# Patient Record
Sex: FEMALE | Race: WHITE | NOT HISPANIC OR LATINO | Employment: UNEMPLOYED | URBAN - NONMETROPOLITAN AREA
[De-identification: names, ages, dates, MRNs, and addresses within clinical notes are randomized per-mention and may not be internally consistent; named-entity substitution may affect disease eponyms.]

---

## 2017-05-04 ENCOUNTER — HOSPITAL ENCOUNTER (OUTPATIENT)
Dept: GENERAL RADIOLOGY | Facility: HOSPITAL | Age: 52
Discharge: HOME | End: 2017-05-04
Attending: INTERNAL MEDICINE | Admitting: INTERNAL MEDICINE

## 2017-05-04 ENCOUNTER — TRANSCRIBE ORDERS (OUTPATIENT)
Dept: ADMINISTRATIVE | Facility: HOSPITAL | Age: 52
End: 2017-05-04

## 2017-05-04 DIAGNOSIS — R05.9 COUGH: Primary | ICD-10-CM

## 2017-05-04 DIAGNOSIS — R05.9 COUGH: ICD-10-CM

## 2017-05-04 PROCEDURE — 71020 HC CHEST PA AND LATERAL: CPT

## 2017-05-04 PROCEDURE — 71020 XR CHEST PA AND LATERAL: CPT | Performed by: RADIOLOGY

## 2017-05-16 ENCOUNTER — TRANSCRIBE ORDERS (OUTPATIENT)
Dept: ADMINISTRATIVE | Facility: HOSPITAL | Age: 52
End: 2017-05-16

## 2017-05-16 DIAGNOSIS — J44.9 OBSTRUCTIVE CHRONIC BRONCHITIS WITHOUT EXACERBATION (HCC): Primary | ICD-10-CM

## 2017-05-23 ENCOUNTER — HOSPITAL ENCOUNTER (OUTPATIENT)
Dept: CT IMAGING | Facility: HOSPITAL | Age: 52
Discharge: HOME | End: 2017-05-23
Attending: INTERNAL MEDICINE | Admitting: INTERNAL MEDICINE

## 2017-05-23 DIAGNOSIS — J44.9 OBSTRUCTIVE CHRONIC BRONCHITIS WITHOUT EXACERBATION (HCC): ICD-10-CM

## 2017-05-23 PROCEDURE — 71260 CT THORAX DX C+: CPT | Performed by: RADIOLOGY

## 2017-05-23 PROCEDURE — 0 IOPAMIDOL 61 % SOLUTION: Performed by: INTERNAL MEDICINE

## 2017-05-23 PROCEDURE — 71260 CT THORAX DX C+: CPT

## 2017-05-23 RX ADMIN — IOPAMIDOL 80 ML: 612 INJECTION, SOLUTION INTRAVENOUS at 10:00

## 2017-05-25 ENCOUNTER — TRANSCRIBE ORDERS (OUTPATIENT)
Dept: ADMINISTRATIVE | Facility: HOSPITAL | Age: 52
End: 2017-05-25

## 2017-05-25 ENCOUNTER — APPOINTMENT (OUTPATIENT)
Dept: CT IMAGING | Facility: HOSPITAL | Age: 52
End: 2017-05-25
Attending: INTERNAL MEDICINE

## 2017-05-25 DIAGNOSIS — N63.0 BREAST NODULE: Primary | ICD-10-CM

## 2017-06-06 ENCOUNTER — TRANSCRIBE ORDERS (OUTPATIENT)
Dept: ADMINISTRATIVE | Facility: HOSPITAL | Age: 52
End: 2017-06-06

## 2017-06-06 ENCOUNTER — HOSPITAL ENCOUNTER (OUTPATIENT)
Dept: MAMMOGRAPHY | Facility: HOSPITAL | Age: 52
Discharge: HOME | End: 2017-06-06
Attending: INTERNAL MEDICINE | Admitting: INTERNAL MEDICINE

## 2017-06-06 ENCOUNTER — HOSPITAL ENCOUNTER (OUTPATIENT)
Dept: GENERAL RADIOLOGY | Facility: HOSPITAL | Age: 52
Discharge: HOME | End: 2017-06-06
Attending: INTERNAL MEDICINE

## 2017-06-06 ENCOUNTER — HOSPITAL ENCOUNTER (OUTPATIENT)
Dept: ULTRASOUND IMAGING | Facility: HOSPITAL | Age: 52
Discharge: HOME | End: 2017-06-06
Attending: INTERNAL MEDICINE

## 2017-06-06 DIAGNOSIS — N63.0 BREAST NODULE: ICD-10-CM

## 2017-06-06 DIAGNOSIS — M54.2 CERVICALGIA: Primary | ICD-10-CM

## 2017-06-06 DIAGNOSIS — M54.2 CERVICALGIA: ICD-10-CM

## 2017-06-06 PROCEDURE — 72050 X-RAY EXAM NECK SPINE 4/5VWS: CPT

## 2017-06-06 PROCEDURE — 76641 ULTRASOUND BREAST COMPLETE: CPT | Performed by: RADIOLOGY

## 2017-06-06 PROCEDURE — G0204 DX MAMMO INCL CAD BI: HCPCS

## 2017-06-06 PROCEDURE — 72050 X-RAY EXAM NECK SPINE 4/5VWS: CPT | Performed by: RADIOLOGY

## 2017-06-06 PROCEDURE — G0279 TOMOSYNTHESIS, MAMMO: HCPCS

## 2017-06-06 PROCEDURE — 77066 DX MAMMO INCL CAD BI: CPT | Performed by: RADIOLOGY

## 2017-06-06 PROCEDURE — 76641 ULTRASOUND BREAST COMPLETE: CPT

## 2017-06-06 PROCEDURE — 77062 BREAST TOMOSYNTHESIS BI: CPT | Performed by: RADIOLOGY

## 2017-09-06 ENCOUNTER — HOSPITAL ENCOUNTER (OUTPATIENT)
Dept: GENERAL RADIOLOGY | Facility: HOSPITAL | Age: 52
Discharge: HOME | End: 2017-09-06
Attending: INTERNAL MEDICINE | Admitting: INTERNAL MEDICINE

## 2017-09-06 ENCOUNTER — TRANSCRIBE ORDERS (OUTPATIENT)
Dept: ADMINISTRATIVE | Facility: HOSPITAL | Age: 52
End: 2017-09-06

## 2017-09-06 DIAGNOSIS — Z13.828 SCREENING FOR RHEUMATOID ARTHRITIS: Primary | ICD-10-CM

## 2017-09-06 DIAGNOSIS — Z13.828 SCREENING FOR RHEUMATOID ARTHRITIS: ICD-10-CM

## 2017-09-06 PROCEDURE — 72082 X-RAY EXAM ENTIRE SPI 2/3 VW: CPT

## 2017-09-06 PROCEDURE — 72082 X-RAY EXAM ENTIRE SPI 2/3 VW: CPT | Performed by: RADIOLOGY

## 2018-03-09 ENCOUNTER — TRANSCRIBE ORDERS (OUTPATIENT)
Dept: ADMINISTRATIVE | Facility: HOSPITAL | Age: 53
End: 2018-03-09

## 2018-03-09 DIAGNOSIS — R10.2 PELVIC PAIN IN FEMALE: Primary | ICD-10-CM

## 2018-03-12 ENCOUNTER — HOSPITAL ENCOUNTER (OUTPATIENT)
Dept: ULTRASOUND IMAGING | Facility: HOSPITAL | Age: 53
Discharge: HOME | End: 2018-03-12
Attending: INTERNAL MEDICINE | Admitting: INTERNAL MEDICINE

## 2018-03-12 DIAGNOSIS — R10.2 PELVIC PAIN IN FEMALE: ICD-10-CM

## 2018-03-12 PROCEDURE — 76830 TRANSVAGINAL US NON-OB: CPT | Performed by: RADIOLOGY

## 2018-03-12 PROCEDURE — 76830 TRANSVAGINAL US NON-OB: CPT

## 2018-05-09 ENCOUNTER — APPOINTMENT (OUTPATIENT)
Dept: GENERAL RADIOLOGY | Facility: HOSPITAL | Age: 53
End: 2018-05-09

## 2018-05-09 ENCOUNTER — HOSPITAL ENCOUNTER (EMERGENCY)
Facility: HOSPITAL | Age: 53
Discharge: HOME | End: 2018-05-09
Admitting: INTERNAL MEDICINE

## 2018-05-09 VITALS
HEIGHT: 65 IN | SYSTOLIC BLOOD PRESSURE: 138 MMHG | OXYGEN SATURATION: 97 % | DIASTOLIC BLOOD PRESSURE: 76 MMHG | WEIGHT: 131 LBS | RESPIRATION RATE: 17 BRPM | HEART RATE: 84 BPM | BODY MASS INDEX: 21.83 KG/M2 | TEMPERATURE: 98 F

## 2018-05-09 DIAGNOSIS — S86.912A MUSCLE STRAIN OF LEFT LOWER LEG, INITIAL ENCOUNTER: ICD-10-CM

## 2018-05-09 DIAGNOSIS — S76.012A HIP STRAIN, LEFT, INITIAL ENCOUNTER: Primary | ICD-10-CM

## 2018-05-09 PROCEDURE — 73502 X-RAY EXAM HIP UNI 2-3 VIEWS: CPT

## 2018-05-09 PROCEDURE — 73564 X-RAY EXAM KNEE 4 OR MORE: CPT

## 2018-05-09 PROCEDURE — 73590 X-RAY EXAM OF LOWER LEG: CPT | Performed by: RADIOLOGY

## 2018-05-09 PROCEDURE — 73590 X-RAY EXAM OF LOWER LEG: CPT

## 2018-05-09 PROCEDURE — 73564 X-RAY EXAM KNEE 4 OR MORE: CPT | Performed by: RADIOLOGY

## 2018-05-09 PROCEDURE — 73503 X-RAY EXAM HIP UNI 4/> VIEWS: CPT | Performed by: RADIOLOGY

## 2018-05-09 PROCEDURE — 99283 EMERGENCY DEPT VISIT LOW MDM: CPT

## 2018-05-09 RX ORDER — ERGOCALCIFEROL (VITAMIN D2) 10 MCG
400 TABLET ORAL DAILY
COMMUNITY
End: 2022-08-02 | Stop reason: ALTCHOICE

## 2018-05-09 RX ORDER — ALBUTEROL SULFATE 90 UG/1
2 AEROSOL, METERED RESPIRATORY (INHALATION) EVERY 4 HOURS PRN
COMMUNITY
End: 2021-03-10 | Stop reason: SDUPTHER

## 2018-05-09 RX ORDER — AMITRIPTYLINE HYDROCHLORIDE 10 MG/1
10 TABLET, FILM COATED ORAL NIGHTLY
COMMUNITY
End: 2019-10-29

## 2018-05-09 RX ORDER — METHOCARBAMOL 750 MG/1
750 TABLET, FILM COATED ORAL 2 TIMES DAILY
COMMUNITY
End: 2018-06-25

## 2018-05-09 NOTE — ED PROVIDER NOTES
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  Hip location:  L hip  Leg location:  L leg and L lower leg  Pain details:     Quality:  Shooting    Radiates to:  L leg    Severity:  Moderate    Onset quality:  Gradual    Duration:  5 days    Timing:  Constant    Progression:  Waxing and waning  Chronicity:  New  Dislocation: no    Foreign body present:  No foreign bodies  Tetanus status:  Up to date  Prior injury to area:  No  Relieved by:  Nothing  Worsened by:  Rotation  Ineffective treatments:  None tried  Associated symptoms: no back pain, no decreased ROM, no fever, no itching, no muscle weakness, no neck pain, no numbness, no swelling and no tingling    Risk factors: no concern for non-accidental trauma and no frequent fractures        Review of Systems   Constitutional: Negative.  Negative for fever.   HENT: Negative.    Eyes: Negative.    Respiratory: Negative.    Cardiovascular: Negative.    Gastrointestinal: Negative.    Endocrine: Negative.    Genitourinary: Negative.    Musculoskeletal: Negative.  Negative for back pain and neck pain.   Skin: Negative.  Negative for itching.   Allergic/Immunologic: Negative.    Neurological: Negative.    Hematological: Negative.    Psychiatric/Behavioral: Negative.        Past Medical History:   Diagnosis Date   • Arthritis    • COPD (chronic obstructive pulmonary disease)    • Migraines        Allergies   Allergen Reactions   • Floxin [Ofloxacin]        History reviewed. No pertinent surgical history.    Family History   Problem Relation Age of Onset   • Hypertension Mother    • Hypertension Father    • Heart disease Sister    • Diabetes Sister    • Breast cancer Neg Hx        Social History     Social  History   • Marital status:      Social History Main Topics   • Smoking status: Current Every Day Smoker     Packs/day: 1.00   • Smokeless tobacco: Never Used   • Alcohol use No   • Drug use: No     Other Topics Concern   • Not on file           Objective   Physical Exam   Constitutional: She appears well-developed and well-nourished.   HENT:   Head: Normocephalic.   Right Ear: External ear normal.   Left Ear: External ear normal.   Mouth/Throat: Oropharynx is clear and moist.   Eyes: EOM are normal. Pupils are equal, round, and reactive to light.   Neck: Normal range of motion. Neck supple.   Cardiovascular: Normal rate and regular rhythm.    Pulmonary/Chest: Effort normal and breath sounds normal.   Abdominal: Soft. Bowel sounds are normal.   Musculoskeletal:   Pain in left hip and left lower leg with external rotation; non-tender, no swelling or erythema   Skin: Skin is warm and dry. Capillary refill takes less than 2 seconds.   Psychiatric: She has a normal mood and affect. Her behavior is normal.   Nursing note and vitals reviewed.      Procedures           ED Course  ED Course                  MDM      Final diagnoses:   Hip strain, left, initial encounter   Muscle strain of left lower leg, initial encounter            Wayne Partida, APRN  05/09/18 1212

## 2018-06-25 ENCOUNTER — HOSPITAL ENCOUNTER (EMERGENCY)
Facility: HOSPITAL | Age: 53
Discharge: HOME | End: 2018-06-25
Attending: INTERNAL MEDICINE | Admitting: INTERNAL MEDICINE

## 2018-06-25 ENCOUNTER — APPOINTMENT (OUTPATIENT)
Dept: ULTRASOUND IMAGING | Facility: HOSPITAL | Age: 53
End: 2018-06-25

## 2018-06-25 ENCOUNTER — APPOINTMENT (OUTPATIENT)
Dept: CT IMAGING | Facility: HOSPITAL | Age: 53
End: 2018-06-25

## 2018-06-25 VITALS
HEART RATE: 85 BPM | DIASTOLIC BLOOD PRESSURE: 77 MMHG | WEIGHT: 132 LBS | OXYGEN SATURATION: 100 % | BODY MASS INDEX: 21.99 KG/M2 | SYSTOLIC BLOOD PRESSURE: 156 MMHG | TEMPERATURE: 98.5 F | RESPIRATION RATE: 18 BRPM | HEIGHT: 65 IN

## 2018-06-25 DIAGNOSIS — M54.16 LUMBAR RADICULOPATHY: Primary | ICD-10-CM

## 2018-06-25 LAB
ALBUMIN SERPL-MCNC: 4.6 G/DL (ref 3.5–5)
ALBUMIN/GLOB SERPL: 1.6 G/DL (ref 1.5–2.5)
ALP SERPL-CCNC: 116 U/L (ref 35–104)
ALT SERPL W P-5'-P-CCNC: 26 U/L (ref 10–36)
ANION GAP SERPL CALCULATED.3IONS-SCNC: 2.3 MMOL/L (ref 3.6–11.2)
AST SERPL-CCNC: 23 U/L (ref 10–30)
BASOPHILS # BLD AUTO: 0.09 10*3/MM3 (ref 0–0.3)
BASOPHILS NFR BLD AUTO: 1.1 % (ref 0–2)
BILIRUB SERPL-MCNC: 0.5 MG/DL (ref 0.2–1.8)
BUN BLD-MCNC: 9 MG/DL (ref 7–21)
BUN/CREAT SERPL: 13.8 (ref 7–25)
CALCIUM SPEC-SCNC: 9.4 MG/DL (ref 7.7–10)
CHLORIDE SERPL-SCNC: 110 MMOL/L (ref 99–112)
CO2 SERPL-SCNC: 27.7 MMOL/L (ref 24.3–31.9)
CREAT BLD-MCNC: 0.65 MG/DL (ref 0.43–1.29)
DEPRECATED RDW RBC AUTO: 45 FL (ref 37–54)
EOSINOPHIL # BLD AUTO: 0.09 10*3/MM3 (ref 0–0.7)
EOSINOPHIL NFR BLD AUTO: 1.1 % (ref 0–5)
ERYTHROCYTE [DISTWIDTH] IN BLOOD BY AUTOMATED COUNT: 12.3 % (ref 11.5–14.5)
ERYTHROCYTE [SEDIMENTATION RATE] IN BLOOD: 8 MM/HR (ref 0–20)
GFR SERPL CREATININE-BSD FRML MDRD: 96 ML/MIN/1.73
GLOBULIN UR ELPH-MCNC: 2.8 GM/DL
GLUCOSE BLD-MCNC: 94 MG/DL (ref 70–110)
HCT VFR BLD AUTO: 42.9 % (ref 37–47)
HGB BLD-MCNC: 15.1 G/DL (ref 12–16)
IMM GRANULOCYTES # BLD: 0.02 10*3/MM3 (ref 0–0.03)
IMM GRANULOCYTES NFR BLD: 0.2 % (ref 0–0.5)
LYMPHOCYTES # BLD AUTO: 3.75 10*3/MM3 (ref 1–3)
LYMPHOCYTES NFR BLD AUTO: 44.4 % (ref 21–51)
MCH RBC QN AUTO: 35.6 PG (ref 27–33)
MCHC RBC AUTO-ENTMCNC: 35.2 G/DL (ref 33–37)
MCV RBC AUTO: 101.2 FL (ref 80–94)
MONOCYTES # BLD AUTO: 0.56 10*3/MM3 (ref 0.1–0.9)
MONOCYTES NFR BLD AUTO: 6.6 % (ref 0–10)
NEUTROPHILS # BLD AUTO: 3.93 10*3/MM3 (ref 1.4–6.5)
NEUTROPHILS NFR BLD AUTO: 46.6 % (ref 30–70)
OSMOLALITY SERPL CALC.SUM OF ELEC: 277.8 MOSM/KG (ref 273–305)
PLATELET # BLD AUTO: 327 10*3/MM3 (ref 130–400)
PMV BLD AUTO: 9.7 FL (ref 6–10)
POTASSIUM BLD-SCNC: 4 MMOL/L (ref 3.5–5.3)
PROT SERPL-MCNC: 7.4 G/DL (ref 6–8)
RBC # BLD AUTO: 4.24 10*6/MM3 (ref 4.2–5.4)
SODIUM BLD-SCNC: 140 MMOL/L (ref 135–153)
WBC NRBC COR # BLD: 8.44 10*3/MM3 (ref 4.5–12.5)

## 2018-06-25 PROCEDURE — 85652 RBC SED RATE AUTOMATED: CPT | Performed by: PHYSICIAN ASSISTANT

## 2018-06-25 PROCEDURE — 93926 LOWER EXTREMITY STUDY: CPT

## 2018-06-25 PROCEDURE — 72192 CT PELVIS W/O DYE: CPT

## 2018-06-25 PROCEDURE — 36415 COLL VENOUS BLD VENIPUNCTURE: CPT

## 2018-06-25 PROCEDURE — 93926 LOWER EXTREMITY STUDY: CPT | Performed by: RADIOLOGY

## 2018-06-25 PROCEDURE — 80053 COMPREHEN METABOLIC PANEL: CPT | Performed by: PHYSICIAN ASSISTANT

## 2018-06-25 PROCEDURE — 72192 CT PELVIS W/O DYE: CPT | Performed by: RADIOLOGY

## 2018-06-25 PROCEDURE — 72131 CT LUMBAR SPINE W/O DYE: CPT | Performed by: RADIOLOGY

## 2018-06-25 PROCEDURE — 72131 CT LUMBAR SPINE W/O DYE: CPT

## 2018-06-25 PROCEDURE — 85025 COMPLETE CBC W/AUTO DIFF WBC: CPT | Performed by: PHYSICIAN ASSISTANT

## 2018-06-25 PROCEDURE — 99284 EMERGENCY DEPT VISIT MOD MDM: CPT

## 2018-06-25 RX ORDER — IBUPROFEN 800 MG/1
800 TABLET ORAL EVERY 6 HOURS PRN
COMMUNITY
End: 2018-06-25

## 2018-06-25 RX ORDER — CYCLOBENZAPRINE HCL 10 MG
10 TABLET ORAL 3 TIMES DAILY PRN
Qty: 15 TABLET | Refills: 0 | OUTPATIENT
Start: 2018-06-25 | End: 2019-10-29

## 2018-06-25 RX ORDER — FENOPROFEN CALCIUM 600 MG
600 TABLET ORAL 3 TIMES DAILY
Qty: 20 TABLET | Refills: 0 | OUTPATIENT
Start: 2018-06-25 | End: 2019-10-29

## 2018-11-20 ENCOUNTER — TRANSCRIBE ORDERS (OUTPATIENT)
Dept: ADMINISTRATIVE | Facility: HOSPITAL | Age: 53
End: 2018-11-20

## 2018-11-20 DIAGNOSIS — R10.9 STOMACH ACHE: Primary | ICD-10-CM

## 2018-12-04 ENCOUNTER — APPOINTMENT (OUTPATIENT)
Dept: ULTRASOUND IMAGING | Facility: HOSPITAL | Age: 53
End: 2018-12-04
Attending: INTERNAL MEDICINE

## 2018-12-18 ENCOUNTER — TRANSCRIBE ORDERS (OUTPATIENT)
Dept: ADMINISTRATIVE | Facility: HOSPITAL | Age: 53
End: 2018-12-18

## 2018-12-18 DIAGNOSIS — H40.1290 LOW TENSION GLAUCOMA, UNSPECIFIED GLAUCOMA STAGE, UNSPECIFIED LATERALITY: Primary | ICD-10-CM

## 2018-12-18 DIAGNOSIS — H53.40 VISUAL FIELD DEFECTS: ICD-10-CM

## 2018-12-31 ENCOUNTER — APPOINTMENT (OUTPATIENT)
Dept: CARDIOLOGY | Facility: HOSPITAL | Age: 53
End: 2018-12-31
Attending: OPHTHALMOLOGY

## 2019-01-08 ENCOUNTER — APPOINTMENT (OUTPATIENT)
Dept: CARDIOLOGY | Facility: HOSPITAL | Age: 54
End: 2019-01-08
Attending: OPHTHALMOLOGY

## 2019-02-12 ENCOUNTER — HOSPITAL ENCOUNTER (OUTPATIENT)
Dept: GENERAL RADIOLOGY | Facility: HOSPITAL | Age: 54
Discharge: HOME | End: 2019-02-12
Admitting: INTERNAL MEDICINE

## 2019-02-12 ENCOUNTER — HOSPITAL ENCOUNTER (OUTPATIENT)
Dept: GENERAL RADIOLOGY | Facility: HOSPITAL | Age: 54
Discharge: HOME | End: 2019-02-12

## 2019-02-12 ENCOUNTER — TRANSCRIBE ORDERS (OUTPATIENT)
Dept: ADMINISTRATIVE | Facility: HOSPITAL | Age: 54
End: 2019-02-12

## 2019-02-12 DIAGNOSIS — M25.552 PAIN OF LEFT HIP JOINT: ICD-10-CM

## 2019-02-12 DIAGNOSIS — M25.561 RIGHT KNEE PAIN, UNSPECIFIED CHRONICITY: ICD-10-CM

## 2019-02-12 DIAGNOSIS — M25.552 PAIN OF LEFT HIP JOINT: Primary | ICD-10-CM

## 2019-02-12 DIAGNOSIS — R07.9 CHEST PAIN, UNSPECIFIED TYPE: ICD-10-CM

## 2019-02-12 PROCEDURE — 73564 X-RAY EXAM KNEE 4 OR MORE: CPT

## 2019-02-12 PROCEDURE — 71046 X-RAY EXAM CHEST 2 VIEWS: CPT | Performed by: RADIOLOGY

## 2019-02-12 PROCEDURE — 73503 X-RAY EXAM HIP UNI 4/> VIEWS: CPT

## 2019-02-12 PROCEDURE — 73564 X-RAY EXAM KNEE 4 OR MORE: CPT | Performed by: RADIOLOGY

## 2019-02-12 PROCEDURE — 73502 X-RAY EXAM HIP UNI 2-3 VIEWS: CPT | Performed by: RADIOLOGY

## 2019-02-12 PROCEDURE — 71046 X-RAY EXAM CHEST 2 VIEWS: CPT

## 2019-03-26 ENCOUNTER — APPOINTMENT (OUTPATIENT)
Dept: CARDIOLOGY | Facility: HOSPITAL | Age: 54
End: 2019-03-26

## 2019-03-26 ENCOUNTER — APPOINTMENT (OUTPATIENT)
Dept: ULTRASOUND IMAGING | Facility: HOSPITAL | Age: 54
End: 2019-03-26

## 2019-03-26 ENCOUNTER — HOSPITAL ENCOUNTER (OUTPATIENT)
Dept: GENERAL RADIOLOGY | Facility: HOSPITAL | Age: 54
Discharge: HOME | End: 2019-03-26
Admitting: INTERNAL MEDICINE

## 2019-03-26 ENCOUNTER — TRANSCRIBE ORDERS (OUTPATIENT)
Dept: ADMINISTRATIVE | Facility: HOSPITAL | Age: 54
End: 2019-03-26

## 2019-03-26 DIAGNOSIS — H53.40 VFD (VISUAL FIELD DEFECT): ICD-10-CM

## 2019-03-26 DIAGNOSIS — M89.8X2 PAIN OF RIGHT HUMERUS: Primary | ICD-10-CM

## 2019-03-26 DIAGNOSIS — M89.8X2 PAIN OF RIGHT HUMERUS: ICD-10-CM

## 2019-03-26 DIAGNOSIS — H40.1290 LOW TENSION GLAUCOMA, UNSPECIFIED GLAUCOMA STAGE, UNSPECIFIED LATERALITY: Primary | ICD-10-CM

## 2019-03-26 PROCEDURE — 73060 X-RAY EXAM OF HUMERUS: CPT

## 2019-03-26 PROCEDURE — 73060 X-RAY EXAM OF HUMERUS: CPT | Performed by: RADIOLOGY

## 2019-10-28 ENCOUNTER — APPOINTMENT (OUTPATIENT)
Dept: GENERAL RADIOLOGY | Facility: HOSPITAL | Age: 54
End: 2019-10-28

## 2019-10-28 ENCOUNTER — HOSPITAL ENCOUNTER (EMERGENCY)
Facility: HOSPITAL | Age: 54
Discharge: LEFT BEFORE DISCHARGE | End: 2019-10-28

## 2019-10-28 VITALS
HEIGHT: 65 IN | RESPIRATION RATE: 18 BRPM | WEIGHT: 126 LBS | SYSTOLIC BLOOD PRESSURE: 169 MMHG | HEART RATE: 101 BPM | OXYGEN SATURATION: 99 % | TEMPERATURE: 98.1 F | DIASTOLIC BLOOD PRESSURE: 85 MMHG | BODY MASS INDEX: 20.99 KG/M2

## 2019-10-28 PROCEDURE — 99211 OFF/OP EST MAY X REQ PHY/QHP: CPT

## 2019-10-28 PROCEDURE — 93005 ELECTROCARDIOGRAM TRACING: CPT | Performed by: EMERGENCY MEDICINE

## 2019-10-28 PROCEDURE — 93010 ELECTROCARDIOGRAM REPORT: CPT | Performed by: INTERNAL MEDICINE

## 2019-10-28 RX ORDER — SODIUM CHLORIDE 0.9 % (FLUSH) 0.9 %
10 SYRINGE (ML) INJECTION AS NEEDED
Status: DISCONTINUED | OUTPATIENT
Start: 2019-10-28 | End: 2019-10-28 | Stop reason: HOSPADM

## 2019-10-29 ENCOUNTER — APPOINTMENT (OUTPATIENT)
Dept: GENERAL RADIOLOGY | Facility: HOSPITAL | Age: 54
End: 2019-10-29

## 2019-10-29 ENCOUNTER — HOSPITAL ENCOUNTER (EMERGENCY)
Facility: HOSPITAL | Age: 54
Discharge: HOME | End: 2019-10-29
Attending: EMERGENCY MEDICINE | Admitting: EMERGENCY MEDICINE

## 2019-10-29 ENCOUNTER — APPOINTMENT (OUTPATIENT)
Dept: ULTRASOUND IMAGING | Facility: HOSPITAL | Age: 54
End: 2019-10-29

## 2019-10-29 ENCOUNTER — APPOINTMENT (OUTPATIENT)
Dept: CT IMAGING | Facility: HOSPITAL | Age: 54
End: 2019-10-29

## 2019-10-29 VITALS
SYSTOLIC BLOOD PRESSURE: 134 MMHG | OXYGEN SATURATION: 98 % | BODY MASS INDEX: 20.99 KG/M2 | RESPIRATION RATE: 18 BRPM | HEIGHT: 65 IN | WEIGHT: 126 LBS | TEMPERATURE: 98.7 F | DIASTOLIC BLOOD PRESSURE: 87 MMHG | HEART RATE: 90 BPM

## 2019-10-29 DIAGNOSIS — R20.2 PARESTHESIA OF LEFT ARM AND LEG: Primary | ICD-10-CM

## 2019-10-29 DIAGNOSIS — R07.9 CHEST PAIN, UNSPECIFIED TYPE: ICD-10-CM

## 2019-10-29 LAB
ALBUMIN SERPL-MCNC: 4.37 G/DL (ref 3.5–5.2)
ALBUMIN/GLOB SERPL: 1.5 G/DL
ALP SERPL-CCNC: 90 U/L (ref 39–117)
ALT SERPL W P-5'-P-CCNC: 16 U/L (ref 1–33)
ANION GAP SERPL CALCULATED.3IONS-SCNC: 11.4 MMOL/L (ref 5–15)
APTT PPP: 28.4 SECONDS (ref 23.8–36.1)
AST SERPL-CCNC: 17 U/L (ref 1–32)
BACTERIA UR QL AUTO: ABNORMAL /HPF
BASOPHILS # BLD AUTO: 0.07 10*3/MM3 (ref 0–0.2)
BASOPHILS NFR BLD AUTO: 0.8 % (ref 0–1.5)
BILIRUB SERPL-MCNC: 0.3 MG/DL (ref 0.2–1.2)
BILIRUB UR QL STRIP: NEGATIVE
BUN BLD-MCNC: 8 MG/DL (ref 6–20)
BUN/CREAT SERPL: 12.5 (ref 7–25)
CALCIUM SPEC-SCNC: 9.3 MG/DL (ref 8.6–10.5)
CHLORIDE SERPL-SCNC: 106 MMOL/L (ref 98–107)
CLARITY UR: CLEAR
CO2 SERPL-SCNC: 24.6 MMOL/L (ref 22–29)
COLOR UR: YELLOW
CREAT BLD-MCNC: 0.64 MG/DL (ref 0.57–1)
DEPRECATED RDW RBC AUTO: 47.5 FL (ref 37–54)
EOSINOPHIL # BLD AUTO: 0.1 10*3/MM3 (ref 0–0.4)
EOSINOPHIL NFR BLD AUTO: 1.1 % (ref 0.3–6.2)
ERYTHROCYTE [DISTWIDTH] IN BLOOD BY AUTOMATED COUNT: 12.5 % (ref 12.3–15.4)
GFR SERPL CREATININE-BSD FRML MDRD: 98 ML/MIN/1.73
GLOBULIN UR ELPH-MCNC: 2.9 GM/DL
GLUCOSE BLD-MCNC: 114 MG/DL (ref 65–99)
GLUCOSE UR STRIP-MCNC: NEGATIVE MG/DL
HCT VFR BLD AUTO: 44.4 % (ref 34–46.6)
HGB BLD-MCNC: 15.2 G/DL (ref 12–15.9)
HGB UR QL STRIP.AUTO: ABNORMAL
HOLD SPECIMEN: NORMAL
HOLD SPECIMEN: NORMAL
HYALINE CASTS UR QL AUTO: ABNORMAL /LPF
IMM GRANULOCYTES # BLD AUTO: 0.03 10*3/MM3 (ref 0–0.05)
IMM GRANULOCYTES NFR BLD AUTO: 0.3 % (ref 0–0.5)
INR PPP: 0.94 (ref 0.9–1.1)
KETONES UR QL STRIP: NEGATIVE
LEUKOCYTE ESTERASE UR QL STRIP.AUTO: NEGATIVE
LIPASE SERPL-CCNC: 27 U/L (ref 13–60)
LYMPHOCYTES # BLD AUTO: 4.06 10*3/MM3 (ref 0.7–3.1)
LYMPHOCYTES NFR BLD AUTO: 44.9 % (ref 19.6–45.3)
MAGNESIUM SERPL-MCNC: 1.8 MG/DL (ref 1.6–2.6)
MCH RBC QN AUTO: 34.7 PG (ref 26.6–33)
MCHC RBC AUTO-ENTMCNC: 34.2 G/DL (ref 31.5–35.7)
MCV RBC AUTO: 101.4 FL (ref 79–97)
MONOCYTES # BLD AUTO: 0.46 10*3/MM3 (ref 0.1–0.9)
MONOCYTES NFR BLD AUTO: 5.1 % (ref 5–12)
NEUTROPHILS # BLD AUTO: 4.33 10*3/MM3 (ref 1.7–7)
NEUTROPHILS NFR BLD AUTO: 47.8 % (ref 42.7–76)
NITRITE UR QL STRIP: NEGATIVE
NRBC BLD AUTO-RTO: 0 /100 WBC (ref 0–0.2)
NT-PROBNP SERPL-MCNC: 27.9 PG/ML (ref 5–900)
PH UR STRIP.AUTO: <=5 [PH] (ref 5–8)
PLATELET # BLD AUTO: 340 10*3/MM3 (ref 140–450)
PMV BLD AUTO: 10.1 FL (ref 6–12)
POTASSIUM BLD-SCNC: 3.6 MMOL/L (ref 3.5–5.2)
PROT SERPL-MCNC: 7.3 G/DL (ref 6–8.5)
PROT UR QL STRIP: NEGATIVE
PROTHROMBIN TIME: 13 SECONDS (ref 11–15.4)
RBC # BLD AUTO: 4.38 10*6/MM3 (ref 3.77–5.28)
RBC # UR: ABNORMAL /HPF
REF LAB TEST METHOD: ABNORMAL
SODIUM BLD-SCNC: 142 MMOL/L (ref 136–145)
SP GR UR STRIP: 1.01 (ref 1–1.03)
SQUAMOUS #/AREA URNS HPF: ABNORMAL /HPF
TROPONIN T SERPL-MCNC: <0.01 NG/ML (ref 0–0.03)
TSH SERPL DL<=0.05 MIU/L-ACNC: 1.14 UIU/ML (ref 0.27–4.2)
UROBILINOGEN UR QL STRIP: ABNORMAL
WBC NRBC COR # BLD: 9.05 10*3/MM3 (ref 3.4–10.8)
WBC UR QL AUTO: ABNORMAL /HPF
WHOLE BLOOD HOLD SPECIMEN: NORMAL
WHOLE BLOOD HOLD SPECIMEN: NORMAL

## 2019-10-29 PROCEDURE — 81001 URINALYSIS AUTO W/SCOPE: CPT | Performed by: PHYSICIAN ASSISTANT

## 2019-10-29 PROCEDURE — 84484 ASSAY OF TROPONIN QUANT: CPT | Performed by: PHYSICIAN ASSISTANT

## 2019-10-29 PROCEDURE — 70450 CT HEAD/BRAIN W/O DYE: CPT

## 2019-10-29 PROCEDURE — 83735 ASSAY OF MAGNESIUM: CPT | Performed by: PHYSICIAN ASSISTANT

## 2019-10-29 PROCEDURE — 71045 X-RAY EXAM CHEST 1 VIEW: CPT | Performed by: RADIOLOGY

## 2019-10-29 PROCEDURE — 83690 ASSAY OF LIPASE: CPT | Performed by: PHYSICIAN ASSISTANT

## 2019-10-29 PROCEDURE — 71045 X-RAY EXAM CHEST 1 VIEW: CPT

## 2019-10-29 PROCEDURE — 93005 ELECTROCARDIOGRAM TRACING: CPT | Performed by: EMERGENCY MEDICINE

## 2019-10-29 PROCEDURE — 85730 THROMBOPLASTIN TIME PARTIAL: CPT | Performed by: PHYSICIAN ASSISTANT

## 2019-10-29 PROCEDURE — 93010 ELECTROCARDIOGRAM REPORT: CPT | Performed by: INTERNAL MEDICINE

## 2019-10-29 PROCEDURE — 93880 EXTRACRANIAL BILAT STUDY: CPT

## 2019-10-29 PROCEDURE — 85610 PROTHROMBIN TIME: CPT | Performed by: PHYSICIAN ASSISTANT

## 2019-10-29 PROCEDURE — 83880 ASSAY OF NATRIURETIC PEPTIDE: CPT | Performed by: PHYSICIAN ASSISTANT

## 2019-10-29 PROCEDURE — 99284 EMERGENCY DEPT VISIT MOD MDM: CPT

## 2019-10-29 PROCEDURE — 96360 HYDRATION IV INFUSION INIT: CPT

## 2019-10-29 PROCEDURE — 70450 CT HEAD/BRAIN W/O DYE: CPT | Performed by: RADIOLOGY

## 2019-10-29 PROCEDURE — 80050 GENERAL HEALTH PANEL: CPT | Performed by: PHYSICIAN ASSISTANT

## 2019-10-29 PROCEDURE — 93880 EXTRACRANIAL BILAT STUDY: CPT | Performed by: RADIOLOGY

## 2019-10-29 PROCEDURE — 96361 HYDRATE IV INFUSION ADD-ON: CPT

## 2019-10-29 RX ORDER — CITALOPRAM 20 MG/1
20 TABLET ORAL DAILY
Qty: 30 TABLET | Refills: 0 | Status: SHIPPED | OUTPATIENT
Start: 2019-10-29 | End: 2020-05-13

## 2019-10-29 RX ORDER — SODIUM CHLORIDE 9 MG/ML
125 INJECTION, SOLUTION INTRAVENOUS CONTINUOUS
Status: DISCONTINUED | OUTPATIENT
Start: 2019-10-29 | End: 2019-10-29 | Stop reason: HOSPADM

## 2019-10-29 RX ORDER — ASPIRIN 81 MG/1
324 TABLET, CHEWABLE ORAL ONCE
Status: COMPLETED | OUTPATIENT
Start: 2019-10-29 | End: 2019-10-29

## 2019-10-29 RX ORDER — SODIUM CHLORIDE 0.9 % (FLUSH) 0.9 %
10 SYRINGE (ML) INJECTION AS NEEDED
Status: DISCONTINUED | OUTPATIENT
Start: 2019-10-29 | End: 2019-10-29 | Stop reason: HOSPADM

## 2019-10-29 RX ADMIN — SODIUM CHLORIDE 125 ML/HR: 9 INJECTION, SOLUTION INTRAVENOUS at 11:33

## 2019-10-29 RX ADMIN — ASPIRIN 324 MG: 81 TABLET, CHEWABLE ORAL at 13:08

## 2019-10-30 ENCOUNTER — TRANSCRIBE ORDERS (OUTPATIENT)
Dept: ADMINISTRATIVE | Facility: HOSPITAL | Age: 54
End: 2019-10-30

## 2019-10-30 DIAGNOSIS — R07.9 CHEST PAIN, UNSPECIFIED TYPE: Primary | ICD-10-CM

## 2020-02-11 ENCOUNTER — OFFICE VISIT (OUTPATIENT)
Dept: PULMONOLOGY | Facility: CLINIC | Age: 55
End: 2020-02-11

## 2020-02-11 VITALS
SYSTOLIC BLOOD PRESSURE: 130 MMHG | TEMPERATURE: 98 F | HEIGHT: 65 IN | OXYGEN SATURATION: 98 % | DIASTOLIC BLOOD PRESSURE: 80 MMHG | WEIGHT: 123 LBS | BODY MASS INDEX: 20.49 KG/M2 | HEART RATE: 81 BPM

## 2020-02-11 DIAGNOSIS — J84.9 ILD (INTERSTITIAL LUNG DISEASE) (HCC): ICD-10-CM

## 2020-02-11 DIAGNOSIS — J43.2 CENTRILOBULAR EMPHYSEMA (HCC): Primary | ICD-10-CM

## 2020-02-11 DIAGNOSIS — F17.210 CIGARETTE NICOTINE DEPENDENCE WITHOUT COMPLICATION: ICD-10-CM

## 2020-02-11 PROCEDURE — 99407 BEHAV CHNG SMOKING > 10 MIN: CPT | Performed by: NURSE PRACTITIONER

## 2020-02-11 PROCEDURE — 94664 DEMO&/EVAL PT USE INHALER: CPT | Performed by: NURSE PRACTITIONER

## 2020-02-11 PROCEDURE — 99203 OFFICE O/P NEW LOW 30 MIN: CPT | Performed by: NURSE PRACTITIONER

## 2020-02-11 PROCEDURE — 94618 PULMONARY STRESS TESTING: CPT | Performed by: NURSE PRACTITIONER

## 2020-02-11 RX ORDER — METHOCARBAMOL 750 MG/1
750 TABLET, FILM COATED ORAL 4 TIMES DAILY PRN
COMMUNITY

## 2020-02-11 RX ORDER — ACETAMINOPHEN 500 MG
500 TABLET ORAL EVERY 6 HOURS PRN
COMMUNITY

## 2020-02-11 RX ORDER — LANOLIN ALCOHOL/MO/W.PET/CERES
1000 CREAM (GRAM) TOPICAL DAILY
COMMUNITY
End: 2022-08-02 | Stop reason: ALTCHOICE

## 2020-02-11 RX ORDER — IPRATROPIUM BROMIDE AND ALBUTEROL SULFATE 2.5; .5 MG/3ML; MG/3ML
3 SOLUTION RESPIRATORY (INHALATION) 4 TIMES DAILY PRN
Qty: 120 ML | Refills: 5 | Status: SHIPPED | OUTPATIENT
Start: 2020-02-11 | End: 2020-05-13

## 2020-02-11 NOTE — PROGRESS NOTES
Were you born premature?  No    Any Childhood infections? No      Breathing problems when you were a child? No     Any childhood allergies?   No            At what age did you begin smoking? 20 years old     Smoking marijuana? No     Any IV drugs? No     How many packs per day? 1/2 a pack     Lung Function Test? yes  Chest X-Ray? yes    CT Chest? yes Allergy Test? no    Family hx of Lung disease or Lung Cancer? Yes     If FHx is posivitive for lung cancer, what is the relationship of the family member? Dad     Any hospitalization in the last year? No     How far can you walk without getting short of breath? Not far     Any coughing? Yes     Any wheezing? Yes     Acid Reflux? No     Do you snore? No     Daytime Fatigue? Yes     Any pets? Yes  Any pet allergies? No     Occupation? Not employed     Have you been exposed to any chemicals at your job? no    What inhalers are you currently using? Albuterol and brovana    Have you had the Influenza Vaccine? no   Would you like to receive this Vaccine today? no    Have you had the Pneumonia Vaccine?  no  Would you like to receive this Vaccine today? no      Subjective    Basilia Coco Chamberlain presents for the following COPD and LUNG DISEASE  .    History of Present Illness     Ms. Chamberlain is a 54 year old female that has a medical history significant for COPD, fibromyalgia, reflex sympathetic dystrophy and migraines.    She presents today for further evaluation of COPD and chronic interstitial lung disease.  She states that she was diagnosed with COPD recently and during an ER visit in October 2019 she was told she had chronic interstitial lung disease.  She states that she experiences shortness of breath and smothering all the time but that it is worse in the morning and with exertion.  She states that she also has a cough that is intermittently productive of a clear sputum that seems to be worse in the morning.  She also voices difficulty when lying flat on her  back but states that it is mostly due to her back problems.  She also complains of pain in her right lung that is worse with inspiration.  She currently takes Brovana nebs twice daily and albuterol  as needed.  She is a current smoker of about half pack per day.  She states that she smoked about 2 packs/day for 15 years and has recently cut down over the last few months to half a pack.        Review of Systems   Respiratory: Positive for cough, shortness of breath and wheezing.        Active Problems:  Problem List Items Addressed This Visit     None      Visit Diagnoses     Centrilobular emphysema (CMS/[facility])    -  Primary    ILD (interstitial lung disease) (CMS/[facility])        Cigarette nicotine dependence without complication              Past Medical History:  Past Medical History:   Diagnosis Date   • Anxiety 06/25/2018   • Arthritis    • COPD (chronic obstructive pulmonary disease) (CMS/[facility])    • Fibromyalgia    • Migraines    • RSD (reflex sympathetic dystrophy)        Family History:  Family History   Problem Relation Age of Onset   • Hypertension Mother    • Hypertension Father    • Heart disease Sister    • Diabetes Sister    • Breast cancer Neg Hx        Social History:  Social History     Tobacco Use   • Smoking status: Current Every Day Smoker     Packs/day: 0.50     Years: 34.00     Pack years: 17.00     Types: Cigarettes   • Smokeless tobacco: Never Used   • Tobacco comment: smoked about 2 ppd for about 15 years. recently cut down to 1/2 ppd.   Substance Use Topics   • Alcohol use: No       Current Medications:  Current Outpatient Medications   Medication Sig Dispense Refill   • acetaminophen (TYLENOL) 500 MG tablet Take 500 mg by mouth Every 6 (Six) Hours As Needed for Mild Pain .     • albuterol (PROVENTIL HFA;VENTOLIN HFA) 108 (90 Base) MCG/ACT inhaler Inhale 2 puffs Every 4 (Four) Hours As Needed for Wheezing.     • ALPRAZolam (XANAX) 0.25 MG tablet Take 0.25 mg by mouth 2 (two) times a day as needed  "for anxiety.     • arformoterol (BROVANA) 15 MCG/2ML nebulizer solution Take  by nebulization 2 (two) times a day.     • methocarbamol (ROBAXIN) 750 MG tablet Take 750 mg by mouth 4 (Four) Times a Day As Needed for Muscle Spasms.     • vitamin B-12 (CYANOCOBALAMIN) 1000 MCG tablet Take 1,000 mcg by mouth Daily.     • Vitamin D, Cholecalciferol, (CHOLECALCIFEROL) 400 units tablet Take 400 Units by mouth Daily.     • citalopram (CELEXA) 20 MG tablet Take 1 tablet by mouth Daily. 30 tablet 0     No current facility-administered medications for this visit.        Allergies:  Allergies   Allergen Reactions   • Floxin [Ofloxacin]        Vitals:  /80   Pulse 81   Temp 98 °F (36.7 °C)   Ht 165.1 cm (65\")   Wt 55.8 kg (123 lb)   LMP  (LMP Unknown)   SpO2 98%   BMI 20.47 kg/m²     Imaging:    Imaging Results (Most Recent)     None          Pulmonary Functions Testing Results:    No results found for: FEV1, FVC, OUV8CKG, TLC, DLCO    Results for orders placed or performed during the hospital encounter of 10/29/19   Comprehensive Metabolic Panel   Result Value Ref Range    Glucose 114 (H) 65 - 99 mg/dL    BUN 8 6 - 20 mg/dL    Creatinine 0.64 0.57 - 1.00 mg/dL    Sodium 142 136 - 145 mmol/L    Potassium 3.6 3.5 - 5.2 mmol/L    Chloride 106 98 - 107 mmol/L    CO2 24.6 22.0 - 29.0 mmol/L    Calcium 9.3 8.6 - 10.5 mg/dL    Total Protein 7.3 6.0 - 8.5 g/dL    Albumin 4.37 3.50 - 5.20 g/dL    ALT (SGPT) 16 1 - 33 U/L    AST (SGOT) 17 1 - 32 U/L    Alkaline Phosphatase 90 39 - 117 U/L    Total Bilirubin 0.3 0.2 - 1.2 mg/dL    eGFR Non African Amer 98 >60 mL/min/1.73    Globulin 2.9 gm/dL    A/G Ratio 1.5 g/dL    BUN/Creatinine Ratio 12.5 7.0 - 25.0    Anion Gap 11.4 5.0 - 15.0 mmol/L   Protime-INR   Result Value Ref Range    Protime 13.0 11.0 - 15.4 Seconds    INR 0.94 0.90 - 1.10   aPTT   Result Value Ref Range    PTT 28.4 23.8 - 36.1 seconds   Lipase   Result Value Ref Range    Lipase 27 13 - 60 U/L   Urinalysis With " Microscopic If Indicated (No Culture) - Urine, Clean Catch   Result Value Ref Range    Color, UA Yellow Yellow, Straw    Appearance, UA Clear Clear    pH, UA <=5.0 5.0 - 8.0    Specific Gravity, UA 1.014 1.005 - 1.030    Glucose, UA Negative Negative    Ketones, UA Negative Negative    Bilirubin, UA Negative Negative    Blood, UA Moderate (2+) (A) Negative    Protein, UA Negative Negative    Leuk Esterase, UA Negative Negative    Nitrite, UA Negative Negative    Urobilinogen, UA 1.0 E.U./dL 0.2 - 1.0 E.U./dL   Troponin   Result Value Ref Range    Troponin T <0.010 0.000 - 0.030 ng/mL   BNP   Result Value Ref Range    proBNP 27.9 5.0 - 900.0 pg/mL   Magnesium   Result Value Ref Range    Magnesium 1.8 1.6 - 2.6 mg/dL   TSH   Result Value Ref Range    TSH 1.140 0.270 - 4.200 uIU/mL   Urinalysis, Microscopic Only - Urine, Clean Catch   Result Value Ref Range    RBC, UA 6-12 (A) None Seen, 0-2 /HPF    WBC, UA 0-2 None Seen, 0-2 /HPF    Bacteria, UA None Seen None Seen /HPF    Squamous Epithelial Cells, UA 7-12 (A) None Seen, 0-2 /HPF    Hyaline Casts, UA None Seen None Seen /LPF    Methodology Automated Microscopy    CBC Auto Differential   Result Value Ref Range    WBC 9.05 3.40 - 10.80 10*3/mm3    RBC 4.38 3.77 - 5.28 10*6/mm3    Hemoglobin 15.2 12.0 - 15.9 g/dL    Hematocrit 44.4 34.0 - 46.6 %    .4 (H) 79.0 - 97.0 fL    MCH 34.7 (H) 26.6 - 33.0 pg    MCHC 34.2 31.5 - 35.7 g/dL    RDW 12.5 12.3 - 15.4 %    RDW-SD 47.5 37.0 - 54.0 fl    MPV 10.1 6.0 - 12.0 fL    Platelets 340 140 - 450 10*3/mm3    Neutrophil % 47.8 42.7 - 76.0 %    Lymphocyte % 44.9 19.6 - 45.3 %    Monocyte % 5.1 5.0 - 12.0 %    Eosinophil % 1.1 0.3 - 6.2 %    Basophil % 0.8 0.0 - 1.5 %    Immature Grans % 0.3 0.0 - 0.5 %    Neutrophils, Absolute 4.33 1.70 - 7.00 10*3/mm3    Lymphocytes, Absolute 4.06 (H) 0.70 - 3.10 10*3/mm3    Monocytes, Absolute 0.46 0.10 - 0.90 10*3/mm3    Eosinophils, Absolute 0.10 0.00 - 0.40 10*3/mm3    Basophils,  Absolute 0.07 0.00 - 0.20 10*3/mm3    Immature Grans, Absolute 0.03 0.00 - 0.05 10*3/mm3    nRBC 0.0 0.0 - 0.2 /100 WBC   Light Blue Top   Result Value Ref Range    Extra Tube hold for add-on    Green Top (Gel)   Result Value Ref Range    Extra Tube Hold for add-ons.    Lavender Top   Result Value Ref Range    Extra Tube hold for add-on    Gold Top - SST   Result Value Ref Range    Extra Tube Hold for add-ons.        Objective   Physical Exam     GENERAL APPEARANCE: Well developed, well nourished, alert and cooperative, and appears to be in no acute distress.    HEAD: normocephalic. Atraumatic.    EYES: PERRL, EOMI. Vision is grossly intact.    THROAT: Oral cavity and pharynx normal. No inflammation, swelling, exudate, or lesions.     NECK: Neck supple.  No thyromegaly.    CARDIAC: Normal S1 and S2. No S3, S4 or murmurs. Rhythm is regular. There is no peripheral edema, cyanosis or pallor. Extremities are warm and well perfused. Capillary refill is less than 2 seconds. No carotid bruits.    RESPIRATORY:Bilateral air entry positive. Bilateral diminished breath sounds. No wheezing, crackles or rhonchi noted.    GI: Positive bowel sounds. Soft, nondistended, nontender.     MUSCULOSKELETAL: No significant deformity or joint abnormality. No edema. Peripheral pulses intact. No varicosities.    NEUROLOGICAL: Strength and sensation symmetric and intact throughout.     PSYCHIATRIC: The mental examination revealed the patient was oriented to person, place, and time.       Assessment/Plan        Centrilobular emphysema:  -Will order a PFT to assess lung function.  -Has an appointment Friday to see Dr. Sanchez, cardiology. She is scheduled for an echo and stress test at this time.  -Ordered an alpha one antitrypsin swab for genotype  -6 MWT completed in office: lowest oxygen saturation was 96%.  Patient stopped walking after 4 minutes due to fatigue.  No indication for supplemental oxygen at this time.  -We will order an  overnight oximetry study to assess oxygen saturation at night.  -Continue albuterol inhaler as needed.  -We will discontinue Wale maher and start her on an Anoro inhaler.  As this is a new inhaler inhalation  education was provided.       - Inhaler technique demonstration/discussion:  I have extensively discussed the steps.  In summary, the steps were discussed in the following order.Patient was advised to rinse the mouth after steroid inhalation to prevent fungal mucositis.  · Remove the cap from the inhaler and shake well.    · Breathe out all the way.    · Place the mouthpiece of the inhaler between your teeth and seal your lips tightly around it.    · As you start to breathe in slowly, press down on the canister one time.   · Keep breathing in as slowly and deeply as you can.    · It should take about 5 seconds for you to completely breathe in.    · Hold your breath for 10 seconds(count to 10 slowly) to allow the medication to reach the airways of the lung.    · Repeat the above steps for each puff.    · Wait about 1 minute between the puffs.    · Replaced the cap on the inhaler when finished.      -Sent prescription for DuoNeb's every 4 hours as needed to her pharmacy.    Chronic interstitial lung disease:  Chest x-ray completed in October 2019 reported chronic interstitial lung disease per radiologist reading.  -She gives no history of auto immune disease.  -Will order a hi resolution CT scan of chest to further evaluate lung parenchyma.    Current Smoker:  Smokes 1/2 pdd    Basilia Chamberlain  reports that she has been smoking cigarettes. She has a 17.00 pack-year smoking history. She has never used smokeless tobacco.. I have educated her on the risk of diseases from using tobacco products such as cancer, COPD and heart diease.     I advised her to quit and she is willing to quit. We have discussed the following method/s for tobacco cessation:  Counseling.  Together we have set a quit date for 1 month  from today.  She will follow up with me in a few months or sooner to check on her progress.    I spent >10 minutes counseling the patient.    She was given Chantix by her PCP but has not started it yet.        Patient's Body mass index is 20.47 kg/m². BMI is within normal parameters. No follow-up required.     Patient has not had her influenza vaccines this year.  She declines vaccines at today's visit.      ICD-10-CM ICD-9-CM   1. Centrilobular emphysema (CMS/HCC) J43.2 492.8   2. ILD (interstitial lung disease) (CMS/HCC) J84.9 515   3. Cigarette nicotine dependence without complication F17.210 305.1       Return in about 3 months (around 5/11/2020).

## 2020-02-18 DIAGNOSIS — G47.34 NOCTURNAL HYPOXIA: Primary | ICD-10-CM

## 2020-02-20 ENCOUNTER — TELEPHONE (OUTPATIENT)
Dept: PULMONOLOGY | Facility: CLINIC | Age: 55
End: 2020-02-20

## 2020-02-20 NOTE — TELEPHONE ENCOUNTER
----- Message from ALANA Nicholson sent at 2/18/2020  4:16 PM EST -----  Do you care to let her know that she will need oxygen at night.  I have out the order in.   Also, I think some of these are not getting linked to my basket because her result wasn't in there.      ----- Message -----  From: Alanis Dutta  Sent: 2/17/2020   4:43 PM EST  To: ALANA Nicholson    Patient would like results of here Overnight pulse OX    2/20/2020  Spoke with patient and made sure she received her O2. She stated that it was burning her nostrils when using. Provider recommended that she use saline nasal spray and we are checking to make sure her oxygen has a humidifier attached.     Patient understood.

## 2020-02-26 ENCOUNTER — TELEPHONE (OUTPATIENT)
Dept: PULMONOLOGY | Facility: CLINIC | Age: 55
End: 2020-02-26

## 2020-02-26 ENCOUNTER — HOSPITAL ENCOUNTER (OUTPATIENT)
Dept: CT IMAGING | Facility: HOSPITAL | Age: 55
Discharge: HOME | End: 2020-02-26

## 2020-02-26 ENCOUNTER — HOSPITAL ENCOUNTER (OUTPATIENT)
Dept: RESPIRATORY THERAPY | Facility: HOSPITAL | Age: 55
Discharge: HOME | End: 2020-02-26
Admitting: NURSE PRACTITIONER

## 2020-02-26 DIAGNOSIS — J43.2 CENTRILOBULAR EMPHYSEMA (HCC): ICD-10-CM

## 2020-02-26 DIAGNOSIS — J84.9 ILD (INTERSTITIAL LUNG DISEASE) (HCC): ICD-10-CM

## 2020-02-26 PROCEDURE — 94729 DIFFUSING CAPACITY: CPT | Performed by: INTERNAL MEDICINE

## 2020-02-26 PROCEDURE — 71250 CT THORAX DX C-: CPT | Performed by: RADIOLOGY

## 2020-02-26 PROCEDURE — 94727 GAS DIL/WSHOT DETER LNG VOL: CPT

## 2020-02-26 PROCEDURE — 71250 CT THORAX DX C-: CPT

## 2020-02-26 PROCEDURE — 94727 GAS DIL/WSHOT DETER LNG VOL: CPT | Performed by: INTERNAL MEDICINE

## 2020-02-26 PROCEDURE — 94060 EVALUATION OF WHEEZING: CPT | Performed by: INTERNAL MEDICINE

## 2020-02-26 PROCEDURE — 94729 DIFFUSING CAPACITY: CPT

## 2020-02-26 PROCEDURE — 94640 AIRWAY INHALATION TREATMENT: CPT

## 2020-02-26 PROCEDURE — 94060 EVALUATION OF WHEEZING: CPT

## 2020-02-26 RX ORDER — ALBUTEROL SULFATE 2.5 MG/3ML
2.5 SOLUTION RESPIRATORY (INHALATION) ONCE
Status: COMPLETED | OUTPATIENT
Start: 2020-02-26 | End: 2020-02-26

## 2020-02-26 RX ADMIN — ALBUTEROL SULFATE 2.5 MG: 2.5 SOLUTION RESPIRATORY (INHALATION) at 09:35

## 2020-02-26 NOTE — TELEPHONE ENCOUNTER
Zelda Guidry, ALANA Pena, [place]             Will you let her know that her alpha one antitrypsin test was normal.

## 2020-02-27 ENCOUNTER — TELEPHONE (OUTPATIENT)
Dept: PULMONOLOGY | Facility: CLINIC | Age: 55
End: 2020-02-27

## 2020-02-27 NOTE — TELEPHONE ENCOUNTER
----- Message from ALANA Nicholson sent at 2/27/2020  1:30 PM EST -----  Her CT was negative for interstitial lung disease or pulmonary fibrosis.  She does have mild coronary artery calcifications, she can see cardiology for this or we can just let her PCP know so that they can follow this.  Which ever her preference is.      ----- Message -----  From: Interface, Rad Results [place]  Sent: 2/26/2020  11:05 AM EST  To: ALANA Nicholson      2/27/2020  Spoke with patient and she stated she already has a cardiologist  and she has an upcoming appointment. I will forward the CT to his office.

## 2020-03-02 ENCOUNTER — TELEPHONE (OUTPATIENT)
Dept: PULMONOLOGY | Facility: CLINIC | Age: 55
End: 2020-03-02

## 2020-03-02 NOTE — TELEPHONE ENCOUNTER
Zelda Guidry, ALANA Pena, [place], [place]             Will you let her know that her PFT shows some moderate restriction.  Her CT chest showed no evidence of interstitial lung disease.  She also does not look like she has any COPD.  She did show some pulmonary edema on her last chest xray.  She needs to have an echo done to rule out any cardiac cause.  I know she was suppose to see cardiology.  Can you ask her where she Is going and if they did an echo, if not we will need to order one.

## 2020-05-13 ENCOUNTER — TELEMEDICINE (OUTPATIENT)
Dept: PULMONOLOGY | Facility: CLINIC | Age: 55
End: 2020-05-13

## 2020-05-13 DIAGNOSIS — G47.10 HYPERSOMNIA: ICD-10-CM

## 2020-05-13 DIAGNOSIS — F17.210 CIGARETTE NICOTINE DEPENDENCE WITHOUT COMPLICATION: ICD-10-CM

## 2020-05-13 DIAGNOSIS — J43.2 CENTRILOBULAR EMPHYSEMA (HCC): Primary | ICD-10-CM

## 2020-05-13 DIAGNOSIS — R53.83 FATIGUE, UNSPECIFIED TYPE: ICD-10-CM

## 2020-05-13 PROCEDURE — 99214 OFFICE O/P EST MOD 30 MIN: CPT | Performed by: NURSE PRACTITIONER

## 2020-05-13 RX ORDER — ALPRAZOLAM 2 MG/1
2 TABLET ORAL
COMMUNITY
Start: 2020-04-24

## 2020-05-13 RX ORDER — CYANOCOBALAMIN 1000 UG/ML
INJECTION, SOLUTION INTRAMUSCULAR; SUBCUTANEOUS
COMMUNITY
Start: 2020-04-24 | End: 2020-05-13

## 2020-05-13 RX ORDER — PRAVASTATIN SODIUM 40 MG
TABLET ORAL
COMMUNITY
Start: 2020-02-05 | End: 2020-05-13

## 2020-05-13 RX ORDER — ASPIRIN 81 MG/1
81 TABLET ORAL DAILY
COMMUNITY
Start: 2020-04-24 | End: 2022-08-18 | Stop reason: SDUPTHER

## 2020-05-13 RX ORDER — TIOTROPIUM BROMIDE AND OLODATEROL 3.124; 2.736 UG/1; UG/1
SPRAY, METERED RESPIRATORY (INHALATION)
COMMUNITY
Start: 2020-04-24 | End: 2020-05-13

## 2020-05-13 RX ORDER — ATORVASTATIN CALCIUM 20 MG/1
TABLET, FILM COATED ORAL
COMMUNITY
Start: 2020-04-24 | End: 2021-03-10 | Stop reason: SDUPTHER

## 2020-05-13 RX ORDER — OMEPRAZOLE 40 MG/1
CAPSULE, DELAYED RELEASE ORAL
COMMUNITY
Start: 2020-04-24 | End: 2021-03-10

## 2020-05-13 RX ORDER — ARFORMOTEROL TARTRATE 15 UG/2ML
SOLUTION RESPIRATORY (INHALATION)
COMMUNITY
Start: 2020-04-24 | End: 2020-05-13

## 2020-05-13 RX ORDER — AMITRIPTYLINE HYDROCHLORIDE 25 MG/1
TABLET, FILM COATED ORAL
COMMUNITY
Start: 2020-04-24

## 2020-05-14 NOTE — PROGRESS NOTES
Have you had the Influenza Vaccine? no       Have you had the Pneumonia Vaccine?  no      Are you a current smoker? yes       Subjective    Basilia Chamberlain presents for the following Emphysema  .    History of Present Illness     Ms. Garza is a 54 year old female with a medical history significant for COPD, fibromyalgia, reflex sympathetic dystrophy and migraines.    She is doing a telephone encounter to follow up on emphysema.  She tells me that she continues to have shortness of breath and cough. She states that she is currently using 2 liters/minute of supplemental oxygen at night. She tells me that she has noticed that her oxygen saturation has been low in the mornings upon awakening.  She states that it has been around 80-84%.  She also reports extreme fatigue and weakness.  She reports that she has followed with Dr. Sanchez since her last visit.  She is currently using an albuterol inhaler.  She tells me that the the anoro inhaler has noted helped her. So she is back to using brovana nebs.  She also reports that she has had redness and swelling in her hands and feet for the last few months. She is a current every day smoker of about 1/2 ppd.    Review of Systems   Constitutional: Positive for fatigue.   Respiratory: Positive for cough and shortness of breath.        Active Problems:  Problem List Items Addressed This Visit     None      Visit Diagnoses     Centrilobular emphysema (CMS/HCC)    -  Primary    Relevant Medications    fluticasone-salmeterol (ADVAIR) 250-50 MCG/DOSE DISKUS    Fatigue, unspecified type        Relevant Orders    Full Pulmonary Function Test With Bronchodilator    KYLE With / DsDNA, RNP, Sjogrens A / B, Smith    Aldolase    ANCA Panel    Anti-Centromere B Antibodies    Anti-DNA Antibody, Double-stranded    CBC & Differential    Comprehensive Metabolic Panel    Hypersensitivity Pneumonitis Profile    proBNP    Rheumatoid Factor    Sjogrens Syndrome-A Extractable Nuclear  Antibody    Sjogrens Syndrome-B Extractable Nuclear Antibody    Hypersomnia        Relevant Orders    Ambulatory Referral to Sleep Lab    Cigarette nicotine dependence without complication              Past Medical History:  Past Medical History:   Diagnosis Date   • Anxiety 06/25/2018   • Arthritis    • COPD (chronic obstructive pulmonary disease) (CMS/HCC)    • Fibromyalgia    • Migraines    • RSD (reflex sympathetic dystrophy)        Family History:  Family History   Problem Relation Age of Onset   • Hypertension Mother    • Hypertension Father    • Heart disease Sister    • Diabetes Sister    • Breast cancer Neg Hx        Social History:  Social History     Tobacco Use   • Smoking status: Current Every Day Smoker     Packs/day: 0.50     Years: 34.00     Pack years: 17.00     Types: Cigarettes   • Smokeless tobacco: Never Used   • Tobacco comment: smoked about 2 ppd for about 15 years. recently cut down to 1/2 ppd.   Substance Use Topics   • Alcohol use: No       Current Medications:  Current Outpatient Medications   Medication Sig Dispense Refill   • acetaminophen (TYLENOL) 500 MG tablet Take 500 mg by mouth Every 6 (Six) Hours As Needed for Mild Pain .     • albuterol (PROVENTIL HFA;VENTOLIN HFA) 108 (90 Base) MCG/ACT inhaler Inhale 2 puffs Every 4 (Four) Hours As Needed for Wheezing.     • ALPRAZolam (XANAX) 0.25 MG tablet Take 0.25 mg by mouth 2 (two) times a day as needed for anxiety.     • methocarbamol (ROBAXIN) 750 MG tablet Take 750 mg by mouth 4 (Four) Times a Day As Needed for Muscle Spasms.     • vitamin B-12 (CYANOCOBALAMIN) 1000 MCG tablet Take 1,000 mcg by mouth Daily.     • Vitamin D, Cholecalciferol, (CHOLECALCIFEROL) 400 units tablet Take 400 Units by mouth Daily.     • ALPRAZolam (XANAX) 2 MG tablet      • amitriptyline (ELAVIL) 25 MG tablet      • aspirin 81 MG EC tablet      • atorvastatin (LIPITOR) 20 MG tablet      • fluticasone-salmeterol (ADVAIR) 250-50 MCG/DOSE DISKUS Inhale 1 puff 2  (Two) Times a Day. 60 each 11   • ibuprofen (ADVIL,MOTRIN) 100 MG/5ML suspension      • omeprazole (priLOSEC) 40 MG capsule        No current facility-administered medications for this visit.        Allergies:  Allergies   Allergen Reactions   • Floxin [Ofloxacin]        Vitals:  LMP  (LMP Unknown)     Imaging:    Imaging Results (Most Recent)     None          Pulmonary Functions Testing Results:    No results found for: FEV1, FVC, BGW4GLE, TLC, DLCO    Results for orders placed or performed during the hospital encounter of 10/29/19   Comprehensive Metabolic Panel   Result Value Ref Range    Glucose 114 (H) 65 - 99 mg/dL    BUN 8 6 - 20 mg/dL    Creatinine 0.64 0.57 - 1.00 mg/dL    Sodium 142 136 - 145 mmol/L    Potassium 3.6 3.5 - 5.2 mmol/L    Chloride 106 98 - 107 mmol/L    CO2 24.6 22.0 - 29.0 mmol/L    Calcium 9.3 8.6 - 10.5 mg/dL    Total Protein 7.3 6.0 - 8.5 g/dL    Albumin 4.37 3.50 - 5.20 g/dL    ALT (SGPT) 16 1 - 33 U/L    AST (SGOT) 17 1 - 32 U/L    Alkaline Phosphatase 90 39 - 117 U/L    Total Bilirubin 0.3 0.2 - 1.2 mg/dL    eGFR Non African Amer 98 >60 mL/min/1.73    Globulin 2.9 gm/dL    A/G Ratio 1.5 g/dL    BUN/Creatinine Ratio 12.5 7.0 - 25.0    Anion Gap 11.4 5.0 - 15.0 mmol/L   Protime-INR   Result Value Ref Range    Protime 13.0 11.0 - 15.4 Seconds    INR 0.94 0.90 - 1.10   aPTT   Result Value Ref Range    PTT 28.4 23.8 - 36.1 seconds   Lipase   Result Value Ref Range    Lipase 27 13 - 60 U/L   Urinalysis With Microscopic If Indicated (No Culture) - Urine, Clean Catch   Result Value Ref Range    Color, UA Yellow Yellow, Straw    Appearance, UA Clear Clear    pH, UA <=5.0 5.0 - 8.0    Specific Gravity, UA 1.014 1.005 - 1.030    Glucose, UA Negative Negative    Ketones, UA Negative Negative    Bilirubin, UA Negative Negative    Blood, UA Moderate (2+) (A) Negative    Protein, UA Negative Negative    Leuk Esterase, UA Negative Negative    Nitrite, UA Negative Negative    Urobilinogen, UA 1.0  E.U./dL 0.2 - 1.0 E.U./dL   Troponin   Result Value Ref Range    Troponin T <0.010 0.000 - 0.030 ng/mL   BNP   Result Value Ref Range    proBNP 27.9 5.0 - 900.0 pg/mL   Magnesium   Result Value Ref Range    Magnesium 1.8 1.6 - 2.6 mg/dL   TSH   Result Value Ref Range    TSH 1.140 0.270 - 4.200 uIU/mL   Urinalysis, Microscopic Only - Urine, Clean Catch   Result Value Ref Range    RBC, UA 6-12 (A) None Seen, 0-2 /HPF    WBC, UA 0-2 None Seen, 0-2 /HPF    Bacteria, UA None Seen None Seen /HPF    Squamous Epithelial Cells, UA 7-12 (A) None Seen, 0-2 /HPF    Hyaline Casts, UA None Seen None Seen /LPF    Methodology Automated Microscopy    CBC Auto Differential   Result Value Ref Range    WBC 9.05 3.40 - 10.80 10*3/mm3    RBC 4.38 3.77 - 5.28 10*6/mm3    Hemoglobin 15.2 12.0 - 15.9 g/dL    Hematocrit 44.4 34.0 - 46.6 %    .4 (H) 79.0 - 97.0 fL    MCH 34.7 (H) 26.6 - 33.0 pg    MCHC 34.2 31.5 - 35.7 g/dL    RDW 12.5 12.3 - 15.4 %    RDW-SD 47.5 37.0 - 54.0 fl    MPV 10.1 6.0 - 12.0 fL    Platelets 340 140 - 450 10*3/mm3    Neutrophil % 47.8 42.7 - 76.0 %    Lymphocyte % 44.9 19.6 - 45.3 %    Monocyte % 5.1 5.0 - 12.0 %    Eosinophil % 1.1 0.3 - 6.2 %    Basophil % 0.8 0.0 - 1.5 %    Immature Grans % 0.3 0.0 - 0.5 %    Neutrophils, Absolute 4.33 1.70 - 7.00 10*3/mm3    Lymphocytes, Absolute 4.06 (H) 0.70 - 3.10 10*3/mm3    Monocytes, Absolute 0.46 0.10 - 0.90 10*3/mm3    Eosinophils, Absolute 0.10 0.00 - 0.40 10*3/mm3    Basophils, Absolute 0.07 0.00 - 0.20 10*3/mm3    Immature Grans, Absolute 0.03 0.00 - 0.05 10*3/mm3    nRBC 0.0 0.0 - 0.2 /100 WBC   Light Blue Top   Result Value Ref Range    Extra Tube hold for add-on    Green Top (Gel)   Result Value Ref Range    Extra Tube Hold for add-ons.    Lavender Top   Result Value Ref Range    Extra Tube hold for add-on    Gold Top - SST   Result Value Ref Range    Extra Tube Hold for add-ons.        Objective   Physical Exam     Physical exam was deferred as visit was  done via telephone encounter.    Assessment/Plan      Centrilobular emphysema:  -PFT was reviewed:  No obstruction was noted. No significant bronchodilator response noted. Moderate ventilatory defect defect was noted. DLCO is moderately reduced.  -Stress echo was reviewed.  -Alpha one antitrypsin genotype is MM  -She is currently using oxygen at 2 liters/minute at night.  Asked her to increase it to 3 liters.  We will also order a sleep study due to hypersomnia.  _continue albuterol as needed.  -Will start her on Symbicort BID. Asked her to stop taking her brovana nebs.  -We will repeat her PFT in August, advised her to not take inhalers or breathing treatments for 3-4 days prior to testing.       Chronic interstitial lung disease:  Chest x-ray completed in October 2019 reported chronic interstitial lung disease per radiologist reading.  -CT chest did not show any evidence of ILD.  -Will order labs for further workup due to extreme fatigue and swelling and redness of feet an hands.    Current Smoker:  Smokes 1/2 pdd  Basiliatapan Sepulveda Bulmaro  reports that she has been smoking cigarettes. She has a 17.00 pack-year smoking history. She has never used smokeless tobacco.. I have educated her on the risk of diseases from using tobacco products such as cancer, COPD and heart diease.     I advised her to quit and she is not willing to quit.             ICD-10-CM ICD-9-CM   1. Centrilobular emphysema (CMS/[facility]) J43.2 492.8   2. Fatigue, unspecified type R53.83 780.79   3. Hypersomnia G47.10 780.54   4. Cigarette nicotine dependence without complication F17.210 305.1       No follow-ups on file.          Unable to complete visit using a video connection to the patient. A phone visit was used to complete this visits. Total time of discussion was 30 minutes.

## 2020-08-24 ENCOUNTER — TRANSCRIBE ORDERS (OUTPATIENT)
Dept: ADMINISTRATIVE | Facility: HOSPITAL | Age: 55
End: 2020-08-24

## 2020-08-24 DIAGNOSIS — Z11.59 ENCOUNTER FOR SCREENING FOR OTHER VIRAL DISEASES: Primary | ICD-10-CM

## 2020-08-25 ENCOUNTER — LAB (OUTPATIENT)
Dept: LAB | Facility: HOSPITAL | Age: 55
End: 2020-08-25

## 2020-08-25 DIAGNOSIS — Z11.59 ENCOUNTER FOR SCREENING FOR OTHER VIRAL DISEASES: ICD-10-CM

## 2020-09-03 ENCOUNTER — OFFICE VISIT (OUTPATIENT)
Dept: PULMONOLOGY | Facility: CLINIC | Age: 55
End: 2020-09-03

## 2020-09-03 VITALS
HEART RATE: 95 BPM | OXYGEN SATURATION: 97 % | SYSTOLIC BLOOD PRESSURE: 138 MMHG | HEIGHT: 65 IN | WEIGHT: 134 LBS | TEMPERATURE: 98.4 F | DIASTOLIC BLOOD PRESSURE: 78 MMHG | BODY MASS INDEX: 22.33 KG/M2

## 2020-09-03 DIAGNOSIS — G47.33 OSA (OBSTRUCTIVE SLEEP APNEA): ICD-10-CM

## 2020-09-03 DIAGNOSIS — J43.2 CENTRILOBULAR EMPHYSEMA (HCC): Primary | ICD-10-CM

## 2020-09-03 DIAGNOSIS — R04.2 HEMOPTYSIS: ICD-10-CM

## 2020-09-03 DIAGNOSIS — F17.210 CIGARETTE NICOTINE DEPENDENCE WITHOUT COMPLICATION: ICD-10-CM

## 2020-09-03 PROCEDURE — 99214 OFFICE O/P EST MOD 30 MIN: CPT | Performed by: NURSE PRACTITIONER

## 2020-09-03 PROCEDURE — 94618 PULMONARY STRESS TESTING: CPT | Performed by: NURSE PRACTITIONER

## 2020-09-03 RX ORDER — TIOTROPIUM BROMIDE AND OLODATEROL 3.124; 2.736 UG/1; UG/1
SPRAY, METERED RESPIRATORY (INHALATION)
Qty: 4 G | Refills: 5 | Status: SHIPPED | OUTPATIENT
Start: 2020-09-03 | End: 2021-03-10

## 2020-09-03 NOTE — PROGRESS NOTES
Have you had the Influenza Vaccine? no   Would you like to receive this Vaccine today? no    Have you had the Pneumonia Vaccine?  no  Would you like to receive this Vaccine today? no    Are you a current smoker? yes   How much? 1 PPD      Subjective    Basilia Coco Chamberlain presents for the following Emphysema      History of Present Illness     Ms. Garza is a 54 year old female with a medical history significant for COPD, fibromyalgia, migraines, and arthritis.      She presents today for a routine follow up on emphysema.  She reports worsening shortness of breath since her last visit.  She states that she noticed worsening in her breathing since starting her oxygen at nighttime.  She states that she experiences worsening shortness of breath mostly in the mornings when she first takes her oxygen off.  She also complains of lung pain and swelling on her left side.  She states that about 2 days ago she started coughing up blood and was started on an antibiotic in case it was a sinus infection.  She reports no improvement since starting antibiotics.  She also tells me that she has a frequent runny nose but that there is no blood from her nose.  Is currently taking Brovana and albuterol nebs.  She is using 2 L/min of supplemental oxygen at nighttime.  She is a current smoker of about 1 pack/day.        Review of Systems   Constitutional: Negative for activity change, fatigue and unexpected weight change.   HENT: Negative for congestion, postnasal drip and rhinorrhea.    Respiratory: Positive for cough (Occasionally with blood.), shortness of breath and wheezing. Negative for apnea and chest tightness.    Cardiovascular: Negative for chest pain and palpitations.   Gastrointestinal: Negative for nausea.   Allergic/Immunologic: Negative for environmental allergies.   Psychiatric/Behavioral: Negative for agitation and confusion.       Active Problems:  Problem List Items Addressed This Visit     None      Visit  Diagnoses     Centrilobular emphysema (CMS/HCC)    -  Primary    ELIZABETH (obstructive sleep apnea)        Hemoptysis        Cigarette nicotine dependence without complication              Past Medical History:  Past Medical History:   Diagnosis Date   • Anxiety 06/25/2018   • Arthritis    • COPD (chronic obstructive pulmonary disease) (CMS/HCC)    • Fibromyalgia    • Migraines    • RSD (reflex sympathetic dystrophy)        Family History:  Family History   Problem Relation Age of Onset   • Hypertension Mother    • Hypertension Father    • Heart disease Sister    • Diabetes Sister    • Breast cancer Neg Hx        Social History:  Social History     Tobacco Use   • Smoking status: Current Every Day Smoker     Packs/day: 0.50     Years: 34.00     Pack years: 17.00     Types: Cigarettes   • Smokeless tobacco: Never Used   • Tobacco comment: smoked about 2 ppd for about 15 years. recently cut down to 1/2 ppd.   Substance Use Topics   • Alcohol use: No       Current Medications:  Current Outpatient Medications   Medication Sig Dispense Refill   • acetaminophen (TYLENOL) 500 MG tablet Take 500 mg by mouth Every 6 (Six) Hours As Needed for Mild Pain .     • albuterol (PROVENTIL HFA;VENTOLIN HFA) 108 (90 Base) MCG/ACT inhaler Inhale 2 puffs Every 4 (Four) Hours As Needed for Wheezing.     • ALPRAZolam (XANAX) 2 MG tablet      • amitriptyline (ELAVIL) 25 MG tablet      • aspirin 81 MG EC tablet      • atorvastatin (LIPITOR) 20 MG tablet      • fluticasone-salmeterol (ADVAIR) 250-50 MCG/DOSE DISKUS Inhale 1 puff 2 (Two) Times a Day. 60 each 11   • ibuprofen (ADVIL,MOTRIN) 100 MG/5ML suspension      • methocarbamol (ROBAXIN) 750 MG tablet Take 750 mg by mouth 4 (Four) Times a Day As Needed for Muscle Spasms.     • omeprazole (priLOSEC) 40 MG capsule      • STIOLTO RESPIMAT 2.5-2.5 MCG/ACT aerosol solution inhaler INHALE 2 PUFFS BY MOUTH EVERY DAY 4 g 5   • vitamin B-12 (CYANOCOBALAMIN) 1000 MCG tablet Take 1,000 mcg by mouth Daily.  "    • Vitamin D, Cholecalciferol, (CHOLECALCIFEROL) 400 units tablet Take 400 Units by mouth Daily.       No current facility-administered medications for this visit.        Allergies:  Allergies   Allergen Reactions   • Floxin [Ofloxacin]        Vitals:  /78   Pulse 95   Temp 98.4 °F (36.9 °C) (Temporal)   Ht 165.1 cm (65\")   Wt 60.8 kg (134 lb)   LMP  (LMP Unknown)   SpO2 97%   BMI 22.30 kg/m²     Imaging:    Imaging Results (Most Recent)     None          Pulmonary Functions Testing Results:    No results found for: FEV1, FVC, FLK2FRV, TLC, DLCO    Results for orders placed or performed during the hospital encounter of 10/29/19   Comprehensive Metabolic Panel   Result Value Ref Range    Glucose 114 (H) 65 - 99 mg/dL    BUN 8 6 - 20 mg/dL    Creatinine 0.64 0.57 - 1.00 mg/dL    Sodium 142 136 - 145 mmol/L    Potassium 3.6 3.5 - 5.2 mmol/L    Chloride 106 98 - 107 mmol/L    CO2 24.6 22.0 - 29.0 mmol/L    Calcium 9.3 8.6 - 10.5 mg/dL    Total Protein 7.3 6.0 - 8.5 g/dL    Albumin 4.37 3.50 - 5.20 g/dL    ALT (SGPT) 16 1 - 33 U/L    AST (SGOT) 17 1 - 32 U/L    Alkaline Phosphatase 90 39 - 117 U/L    Total Bilirubin 0.3 0.2 - 1.2 mg/dL    eGFR Non African Amer 98 >60 mL/min/1.73    Globulin 2.9 gm/dL    A/G Ratio 1.5 g/dL    BUN/Creatinine Ratio 12.5 7.0 - 25.0    Anion Gap 11.4 5.0 - 15.0 mmol/L   Protime-INR   Result Value Ref Range    Protime 13.0 11.0 - 15.4 Seconds    INR 0.94 0.90 - 1.10   aPTT   Result Value Ref Range    PTT 28.4 23.8 - 36.1 seconds   Lipase   Result Value Ref Range    Lipase 27 13 - 60 U/L   Urinalysis With Microscopic If Indicated (No Culture) - Urine, Clean Catch   Result Value Ref Range    Color, UA Yellow Yellow, Straw    Appearance, UA Clear Clear    pH, UA <=5.0 5.0 - 8.0    Specific Gravity, UA 1.014 1.005 - 1.030    Glucose, UA Negative Negative    Ketones, UA Negative Negative    Bilirubin, UA Negative Negative    Blood, UA Moderate (2+) (A) Negative    Protein, UA " Negative Negative    Leuk Esterase, UA Negative Negative    Nitrite, UA Negative Negative    Urobilinogen, UA 1.0 E.U./dL 0.2 - 1.0 E.U./dL   Troponin   Result Value Ref Range    Troponin T <0.010 0.000 - 0.030 ng/mL   BNP   Result Value Ref Range    proBNP 27.9 5.0 - 900.0 pg/mL   Magnesium   Result Value Ref Range    Magnesium 1.8 1.6 - 2.6 mg/dL   TSH   Result Value Ref Range    TSH 1.140 0.270 - 4.200 uIU/mL   Urinalysis, Microscopic Only - Urine, Clean Catch   Result Value Ref Range    RBC, UA 6-12 (A) None Seen, 0-2 /HPF    WBC, UA 0-2 None Seen, 0-2 /HPF    Bacteria, UA None Seen None Seen /HPF    Squamous Epithelial Cells, UA 7-12 (A) None Seen, 0-2 /HPF    Hyaline Casts, UA None Seen None Seen /LPF    Methodology Automated Microscopy    CBC Auto Differential   Result Value Ref Range    WBC 9.05 3.40 - 10.80 10*3/mm3    RBC 4.38 3.77 - 5.28 10*6/mm3    Hemoglobin 15.2 12.0 - 15.9 g/dL    Hematocrit 44.4 34.0 - 46.6 %    .4 (H) 79.0 - 97.0 fL    MCH 34.7 (H) 26.6 - 33.0 pg    MCHC 34.2 31.5 - 35.7 g/dL    RDW 12.5 12.3 - 15.4 %    RDW-SD 47.5 37.0 - 54.0 fl    MPV 10.1 6.0 - 12.0 fL    Platelets 340 140 - 450 10*3/mm3    Neutrophil % 47.8 42.7 - 76.0 %    Lymphocyte % 44.9 19.6 - 45.3 %    Monocyte % 5.1 5.0 - 12.0 %    Eosinophil % 1.1 0.3 - 6.2 %    Basophil % 0.8 0.0 - 1.5 %    Immature Grans % 0.3 0.0 - 0.5 %    Neutrophils, Absolute 4.33 1.70 - 7.00 10*3/mm3    Lymphocytes, Absolute 4.06 (H) 0.70 - 3.10 10*3/mm3    Monocytes, Absolute 0.46 0.10 - 0.90 10*3/mm3    Eosinophils, Absolute 0.10 0.00 - 0.40 10*3/mm3    Basophils, Absolute 0.07 0.00 - 0.20 10*3/mm3    Immature Grans, Absolute 0.03 0.00 - 0.05 10*3/mm3    nRBC 0.0 0.0 - 0.2 /100 WBC   Light Blue Top   Result Value Ref Range    Extra Tube hold for add-on    Green Top (Gel)   Result Value Ref Range    Extra Tube Hold for add-ons.    Lavender Top   Result Value Ref Range    Extra Tube hold for add-on    Gold Top - SST   Result Value Ref  Range    Extra Tube Hold for add-ons.        Objective   Physical Exam     GENERAL APPEARANCE: Well developed, well nourished, alert and cooperative, and appears to be in no acute distress.    HEAD: normocephalic. Atraumatic.    EYES: PERRL, EOMI. Vision is grossly intact.    THROAT: Oral cavity and pharynx normal. No inflammation, swelling, exudate, or lesions.     NECK: Neck supple.  No thyromegaly.    CARDIAC: Normal S1 and S2. No S3, S4 or murmurs. Rhythm is regular.     RESPIRATORY:Bilateral air entry positive. Bilateral diminished breath sounds. No wheezing, crackles or rhonchi noted.    GI: Positive bowel sounds. Soft, nondistended, nontender.     MUSCULOSKELETAL: No significant deformity or joint abnormality. No edema. Peripheral pulses intact. No varicosities.    NEUROLOGICAL: Strength and sensation symmetric and intact throughout.     PSYCHIATRIC: The mental examination revealed the patient was oriented to person, place, and time.       Assessment/Plan      Centrilobular emphysema:  -PFT showed no obstruction with no significant bronchodilator response.  Moderate ventilatory defect was noted.  DLCO was moderately reduced.  -Continue albuterol as needed.  -Continue Brovana nebs.  Symbicort was tried and was making her have a lot of shaking.  -We will start her on Spiriva, 1 to 2 puffs once daily.  -We will order a repeat PFT.  -Use of supplemental oxygen at nighttime.  Will evaluate for sleep apnea with a sleep study.  -6-minute walk test was completed in office.  Lowest oxygen saturation was noted to be 94%.  No indication for oxygen at this time.  -We will obtain records from her cardiologist to evaluate echo and most recent testing.    Current Smoker:  Smokes 1 ppd    Basilia Chamberlain  reports that she has been smoking cigarettes. She has a 17.00 pack-year smoking history. She has never used smokeless tobacco.. I have educated her on the risk of diseases from using tobacco products such as  cancer, COPD and heart diease.     I advised her to quit and she is not willing to quit.        ELIZABETH:  -Ordered a home sleep study.  -Patient's Body mass index is 22.3 kg/m². BMI is within normal parameters. No follow-up required.   - Patient was educated on positive airway pressure treatment.  As per CMS guidelines, more than 4 hours on 70% of observed nights is considered adherence. Patient was strongly encouraged to use CPAP as much as possible during sleep as more CPAP use is equal to more benefit. Use of heated humidification in positive airway pressure treatment to improve the adherence to the device.  In case of claustrophobia, we will provide the patient cognitive behavioral therapy and desensitization. Oral appliances use will be discussed with the patient in case of mild to moderate sleep apnea or if the patient with severe disease fail positive airway pressure treatment.       The patient was extensively educated on the consequences of untreated obstructive sleep apnea namely cardiovascular/metabolic disorder, neurocognitive deficit, daytime sleepiness, motor vehicle accidents, depression, mood disorders and reduced quality of life.  At the end of conversation, the patient voices understanding of the disease process and treatment modality.  Patient also understands the risk of untreated obstructive sleep apnea and benefit benefits of the treatment.    Counseling time was greater than 10 minutes.    Hemoptysis:  -Patient reports hemoptysis that started a couple of days ago.  Will order a CT chest without contrast to evaluate.        ICD-10-CM ICD-9-CM   1. Centrilobular emphysema (CMS/HCC) J43.2 492.8   2. ELIZABETH (obstructive sleep apnea) G47.33 327.23   3. Hemoptysis R04.2 786.30   4. Cigarette nicotine dependence without complication F17.210 305.1       Return in about 3 months (around 12/3/2020) for Chrissie.

## 2020-09-17 ENCOUNTER — TRANSCRIBE ORDERS (OUTPATIENT)
Dept: ADMINISTRATIVE | Facility: HOSPITAL | Age: 55
End: 2020-09-17

## 2020-09-17 DIAGNOSIS — Z01.818 OTHER SPECIFIED PRE-OPERATIVE EXAMINATION: Primary | ICD-10-CM

## 2020-09-21 ENCOUNTER — LAB (OUTPATIENT)
Dept: LAB | Facility: HOSPITAL | Age: 55
End: 2020-09-21

## 2020-09-21 LAB — SARS-COV-2 RNA NOSE QL NAA+PROBE: NOT DETECTED

## 2020-09-21 PROCEDURE — U0004 COV-19 TEST NON-CDC HGH THRU: HCPCS | Performed by: NURSE PRACTITIONER

## 2020-09-23 ENCOUNTER — HOSPITAL ENCOUNTER (OUTPATIENT)
Dept: CT IMAGING | Facility: HOSPITAL | Age: 55
Discharge: HOME | End: 2020-09-23

## 2020-09-23 ENCOUNTER — HOSPITAL ENCOUNTER (OUTPATIENT)
Dept: RESPIRATORY THERAPY | Facility: HOSPITAL | Age: 55
Discharge: HOME | End: 2020-09-23

## 2020-09-23 DIAGNOSIS — R04.2 HEMOPTYSIS: ICD-10-CM

## 2020-09-23 DIAGNOSIS — J43.2 CENTRILOBULAR EMPHYSEMA (HCC): ICD-10-CM

## 2020-09-23 DIAGNOSIS — F17.210 CIGARETTE NICOTINE DEPENDENCE WITHOUT COMPLICATION: ICD-10-CM

## 2020-09-23 PROCEDURE — 94060 EVALUATION OF WHEEZING: CPT

## 2020-09-23 PROCEDURE — 71250 CT THORAX DX C-: CPT | Performed by: RADIOLOGY

## 2020-09-23 PROCEDURE — 94727 GAS DIL/WSHOT DETER LNG VOL: CPT

## 2020-09-23 PROCEDURE — 94729 DIFFUSING CAPACITY: CPT

## 2020-09-23 PROCEDURE — 94729 DIFFUSING CAPACITY: CPT | Performed by: INTERNAL MEDICINE

## 2020-09-23 PROCEDURE — 94727 GAS DIL/WSHOT DETER LNG VOL: CPT | Performed by: INTERNAL MEDICINE

## 2020-09-23 PROCEDURE — 94060 EVALUATION OF WHEEZING: CPT | Performed by: INTERNAL MEDICINE

## 2020-09-23 PROCEDURE — 71250 CT THORAX DX C-: CPT

## 2020-09-24 ENCOUNTER — DOCUMENTATION (OUTPATIENT)
Dept: PULMONOLOGY | Facility: CLINIC | Age: 55
End: 2020-09-24

## 2020-09-25 ENCOUNTER — DOCUMENTATION (OUTPATIENT)
Dept: PULMONOLOGY | Facility: CLINIC | Age: 55
End: 2020-09-25

## 2020-10-05 ENCOUNTER — TELEPHONE (OUTPATIENT)
Dept: PULMONOLOGY | Facility: CLINIC | Age: 55
End: 2020-10-05

## 2020-10-05 NOTE — TELEPHONE ENCOUNTER
----- Message from ALANA Nicholson sent at 10/2/2020  1:16 PM EDT -----  Will you let her know that her PFT shows mild COPD.  ----- Message -----  From: Lm Dickens MD  Sent: 10/1/2020   2:22 PM EDT  To: ALANA Nicholson

## 2020-12-09 ENCOUNTER — OFFICE VISIT (OUTPATIENT)
Dept: PULMONOLOGY | Facility: CLINIC | Age: 55
End: 2020-12-09

## 2020-12-09 VITALS — HEIGHT: 65 IN | BODY MASS INDEX: 21.99 KG/M2 | WEIGHT: 132 LBS

## 2020-12-09 DIAGNOSIS — F17.210 CIGARETTE NICOTINE DEPENDENCE WITHOUT COMPLICATION: ICD-10-CM

## 2020-12-09 DIAGNOSIS — J43.2 CENTRILOBULAR EMPHYSEMA (HCC): Primary | ICD-10-CM

## 2020-12-09 DIAGNOSIS — G47.34 NOCTURNAL HYPOXEMIA: ICD-10-CM

## 2020-12-09 PROCEDURE — 99443 PR PHYS/QHP TELEPHONE EVALUATION 21-30 MIN: CPT | Performed by: NURSE PRACTITIONER

## 2020-12-09 NOTE — PROGRESS NOTES
You have chosen to receive care through a telephone visit. Do you consent to use a telephone visit for your medical care today? Yes      Have you had the Influenza Vaccine? no     Have you had the Pneumonia Vaccine?  no    Are you a current smoker? yes       Subjective    Basilia Chamberlain presents for the following Emphysema  .    History of Present Illness     Ms. Chamberlain is a 55 year old female with a medical history significant for sciatica, arthritis, COPD, and fibromyalgia.      She is doing a telephone visit today for routine follow-up on COPD.  She states that overall she has been feeling well.  She states that she still doing Brovana nebs twice daily and using supplemental oxygen at nighttime.  She states that she still continues to have smothering throughout the day.  She is currently smoking about 1 pack/day.        Review of Systems   Respiratory: Positive for cough and shortness of breath.        Active Problems:  Problem List Items Addressed This Visit     None      Visit Diagnoses     Centrilobular emphysema (CMS/HCC)    -  Primary    Relevant Medications    tiotropium bromide monohydrate (SPIRIVA RESPIMAT) 2.5 MCG/ACT aerosol solution inhaler    Nocturnal hypoxemia        Cigarette nicotine dependence without complication              Past Medical History:  Past Medical History:   Diagnosis Date   • Anxiety 06/25/2018   • Arthritis    • COPD (chronic obstructive pulmonary disease) (CMS/HCC)    • Fibromyalgia    • Migraines    • RSD (reflex sympathetic dystrophy)        Family History:  Family History   Problem Relation Age of Onset   • Hypertension Mother    • Hypertension Father    • Heart disease Sister    • Diabetes Sister    • Breast cancer Neg Hx        Social History:  Social History     Tobacco Use   • Smoking status: Current Every Day Smoker     Packs/day: 1.00     Types: Cigarettes   • Smokeless tobacco: Never Used   • Tobacco comment: smoked about 2 ppd for about 15 years.  "recently cut down to 1/2 ppd.   Substance Use Topics   • Alcohol use: No       Current Medications:  Current Outpatient Medications   Medication Sig Dispense Refill   • acetaminophen (TYLENOL) 500 MG tablet Take 500 mg by mouth Every 6 (Six) Hours As Needed for Mild Pain .     • albuterol (PROVENTIL HFA;VENTOLIN HFA) 108 (90 Base) MCG/ACT inhaler Inhale 2 puffs Every 4 (Four) Hours As Needed for Wheezing.     • ALPRAZolam (XANAX) 2 MG tablet      • amitriptyline (ELAVIL) 25 MG tablet      • aspirin 81 MG EC tablet      • atorvastatin (LIPITOR) 20 MG tablet      • fluticasone-salmeterol (ADVAIR) 250-50 MCG/DOSE DISKUS Inhale 1 puff 2 (Two) Times a Day. 60 each 11   • ibuprofen (ADVIL,MOTRIN) 100 MG/5ML suspension      • methocarbamol (ROBAXIN) 750 MG tablet Take 750 mg by mouth 4 (Four) Times a Day As Needed for Muscle Spasms.     • omeprazole (priLOSEC) 40 MG capsule      • STIOLTO RESPIMAT 2.5-2.5 MCG/ACT aerosol solution inhaler INHALE 2 PUFFS BY MOUTH EVERY DAY 4 g 5   • vitamin B-12 (CYANOCOBALAMIN) 1000 MCG tablet Take 1,000 mcg by mouth Daily.     • Vitamin D, Cholecalciferol, (CHOLECALCIFEROL) 400 units tablet Take 400 Units by mouth Daily.     • tiotropium bromide monohydrate (SPIRIVA RESPIMAT) 2.5 MCG/ACT aerosol solution inhaler Inhale 2 puffs Daily. 4 g 6     No current facility-administered medications for this visit.        Allergies:  Allergies   Allergen Reactions   • Floxin [Ofloxacin]        Vitals:  Ht 165.1 cm (65\")   Wt 59.9 kg (132 lb)   LMP  (LMP Unknown)   BMI 21.97 kg/m²     Imaging:    Imaging Results (Most Recent)     None          Pulmonary Functions Testing Results:    No results found for: FEV1, FVC, CGC0BFP, TLC, DLCO    Results for orders placed or performed in visit on 09/17/20   COVID-19,[facility], NP SWAB IN [facility]SP[facility] MEDIA 24-30 HR TAT - Swab, Nasopharynx    Specimen: Nasopharynx; Swab   Result Value Ref Range    SARS-CoV-2 ELLY Not Detected Not Detected "       Objective   Physical Exam     Exam deferred as visit was done via telephone.    Assessment/Plan       Centrilobular emphysema:  Most recent PFT shows mild trapping and a severely reduced DLCO.  CT Chest shows mild emphysema.  -Continue Brovana nebs BID  -Will start Spiriva once daily.  -Continue albuterol as needed.    -Sleep study showed no evidence of sleep apnea.  She is currently using supplemental oxygen at 2-1/2 L/min at nighttime.      Current Smoker:      Smoker 1 ppd.    Basilia Chamberlain  reports that she has been smoking cigarettes. She has been smoking about 1.00 pack per day. She has never used smokeless tobacco.. I have educated her on the risk of diseases from using tobacco products such as cancer, COPD and heart disease.     I advised her to quit and she is not willing to quit.        ELIZABETH:  -Sleep Study shows no evidence of sleep apnea.          ICD-10-CM ICD-9-CM   1. Centrilobular emphysema (CMS/HCC)  J43.2 492.8   2. Nocturnal hypoxemia  G47.34 327.24   3. Cigarette nicotine dependence without complication  F17.210 305.1       Return in about 3 months (around 3/9/2021).          This visit has been rescheduled as a phone visit to comply with patient safety concerns in accordance with CDC recommendations. Total time of discussion was 30 minutes.

## 2021-03-02 ENCOUNTER — HOSPITAL ENCOUNTER (OUTPATIENT)
Dept: GENERAL RADIOLOGY | Facility: HOSPITAL | Age: 56
Discharge: HOME | End: 2021-03-02
Admitting: INTERNAL MEDICINE

## 2021-03-02 ENCOUNTER — TRANSCRIBE ORDERS (OUTPATIENT)
Dept: ADMINISTRATIVE | Facility: HOSPITAL | Age: 56
End: 2021-03-02

## 2021-03-02 DIAGNOSIS — Z72.0 TOBACCO USER: Primary | ICD-10-CM

## 2021-03-02 DIAGNOSIS — Z72.0 TOBACCO USER: ICD-10-CM

## 2021-03-02 PROCEDURE — 71046 X-RAY EXAM CHEST 2 VIEWS: CPT

## 2021-03-02 PROCEDURE — 71046 X-RAY EXAM CHEST 2 VIEWS: CPT | Performed by: RADIOLOGY

## 2021-03-10 ENCOUNTER — OFFICE VISIT (OUTPATIENT)
Dept: PULMONOLOGY | Facility: CLINIC | Age: 56
End: 2021-03-10

## 2021-03-10 VITALS — HEIGHT: 65 IN | BODY MASS INDEX: 20.83 KG/M2 | WEIGHT: 125 LBS

## 2021-03-10 DIAGNOSIS — F17.210 CIGARETTE NICOTINE DEPENDENCE WITHOUT COMPLICATION: ICD-10-CM

## 2021-03-10 DIAGNOSIS — J43.2 CENTRILOBULAR EMPHYSEMA (HCC): Primary | ICD-10-CM

## 2021-03-10 DIAGNOSIS — K21.9 GERD WITHOUT ESOPHAGITIS: ICD-10-CM

## 2021-03-10 PROCEDURE — 99442 PR PHYS/QHP TELEPHONE EVALUATION 11-20 MIN: CPT | Performed by: NURSE PRACTITIONER

## 2021-03-10 RX ORDER — OMEPRAZOLE 40 MG/1
40 CAPSULE, DELAYED RELEASE ORAL DAILY
Qty: 30 CAPSULE | Refills: 5 | Status: SHIPPED | OUTPATIENT
Start: 2021-03-10 | End: 2022-11-07 | Stop reason: ALTCHOICE

## 2021-03-10 RX ORDER — IPRATROPIUM BROMIDE AND ALBUTEROL SULFATE 2.5; .5 MG/3ML; MG/3ML
3 SOLUTION RESPIRATORY (INHALATION) 4 TIMES DAILY PRN
Qty: 120 ML | Refills: 5 | Status: SHIPPED | OUTPATIENT
Start: 2021-03-10 | End: 2022-09-01 | Stop reason: SDUPTHER

## 2021-03-10 RX ORDER — ALBUTEROL SULFATE 90 UG/1
2 AEROSOL, METERED RESPIRATORY (INHALATION) EVERY 4 HOURS PRN
Qty: 18 G | Refills: 5 | Status: SHIPPED | OUTPATIENT
Start: 2021-03-10 | End: 2022-09-01 | Stop reason: SDUPTHER

## 2021-03-10 RX ORDER — ATORVASTATIN CALCIUM 20 MG/1
20 TABLET, FILM COATED ORAL NIGHTLY
COMMUNITY
Start: 2020-03-27 | End: 2022-08-04

## 2021-03-10 NOTE — PROGRESS NOTES
"  You have chosen to receive care through a telephone visit. Do you consent to use a telephone visit for your medical care today? Yes      Chief Complaint  Emphysema (follow up)    Subjective          Basilia Chamberlain presents to [facility] PULMONARY [facility]ITICAL CARE MEDICINE  History of Present Illness     Ms. Chamberlain is a 55 year old female with a medical history significant for COPD, fibromyalgia and migraines.    She is doing a telephone visit today for routine follow-up on COPD.  She states that overall she has been feeling well.  She states that she does have shortness of breath and cough but that it is at baseline.  She is currently using albuterol as needed and Spiriva once daily.  She is compliant with oxygen use of 2-1/2 L/min at nighttime.  She remains a current smoker of about half pack per day.    Objective   Vital Signs:   Ht 165.1 cm (65\")   Wt 56.7 kg (125 lb)   BMI 20.80 kg/m²     Physical Exam     Physical exam was deferred as visit was done via telephone encounter    Result Review :   The following data was reviewed by: ALANA Nicholson on 03/10/2021:             Assessment and Plan    Diagnoses and all orders for this visit:    1. Centrilobular emphysema (CMS/HCC) (Primary)    2. Cigarette nicotine dependence without complication    3. GERD without esophagitis    Other orders  -     albuterol sulfate  (90 Base) MCG/ACT inhaler; Inhale 2 puffs Every 4 (Four) Hours As Needed for Wheezing.  Dispense: 18 g; Refill: 5  -     omeprazole (priLOSEC) 40 MG capsule; Take 1 capsule by mouth Daily.  Dispense: 30 capsule; Refill: 5  -     ipratropium-albuterol (DUO-NEB) 0.5-2.5 mg/3 ml nebulizer; Take 3 mL by nebulization 4 (Four) Times a Day As Needed for Wheezing or Shortness of Air.  Dispense: 120 mL; Refill: 5         Centrilobular emphysema:  -Continue Spiriva once daily.  -Continue albuterol every 4 hours as needed.  Refill sent to pharmacy.  -Continue " duo nebs every 4 hours as needed.  Refill sent to pharmacy  -Compliant with supplemental oxygen use at 2-1/2 L/min during the night.    GERD:  Prescription for omeprazole to start taking daily.    Current Smoker:    Currently smoking about half pack per day.    Basilia Chamberlain  reports that she has been smoking cigarettes. She has been smoking about 1.00 pack per day. She has never used smokeless tobacco.. I have educated her on the risk of diseases from using tobacco products such as cancer, COPD and heart disease.     I advised her to quit and she is not willing to quit.           Follow Up   Return in about 6 months (around 9/10/2021).  Patient was given instructions and counseling regarding her condition or for health maintenance advice. Please see specific information pulled into the AVS if appropriate.         This visit has been rescheduled as a phone visit to comply with patient safety concerns in accordance with CDC recommendations. Total time of discussion was 15 minutes.

## 2021-03-23 ENCOUNTER — BULK ORDERING (OUTPATIENT)
Dept: CASE MANAGEMENT | Facility: OTHER | Age: 56
End: 2021-03-23

## 2021-03-23 DIAGNOSIS — Z23 IMMUNIZATION DUE: ICD-10-CM

## 2021-04-06 ENCOUNTER — HOSPITAL ENCOUNTER (OUTPATIENT)
Dept: CARDIOLOGY | Facility: HOSPITAL | Age: 56
Discharge: HOME | End: 2021-04-06
Admitting: INTERNAL MEDICINE

## 2021-04-06 ENCOUNTER — TRANSCRIBE ORDERS (OUTPATIENT)
Dept: ADMINISTRATIVE | Facility: HOSPITAL | Age: 56
End: 2021-04-06

## 2021-04-06 DIAGNOSIS — M79.89 SWELLING OF LIMB: Primary | ICD-10-CM

## 2021-04-06 DIAGNOSIS — M79.89 SWELLING OF LIMB: ICD-10-CM

## 2021-04-06 PROCEDURE — 93971 EXTREMITY STUDY: CPT | Performed by: RADIOLOGY

## 2021-04-06 PROCEDURE — 93971 EXTREMITY STUDY: CPT

## 2021-04-15 ENCOUNTER — APPOINTMENT (OUTPATIENT)
Dept: ULTRASOUND IMAGING | Facility: HOSPITAL | Age: 56
End: 2021-04-15

## 2021-04-15 ENCOUNTER — HOSPITAL ENCOUNTER (EMERGENCY)
Facility: HOSPITAL | Age: 56
Discharge: HOME | End: 2021-04-15
Attending: STUDENT IN AN ORGANIZED HEALTH CARE EDUCATION/TRAINING PROGRAM | Admitting: STUDENT IN AN ORGANIZED HEALTH CARE EDUCATION/TRAINING PROGRAM

## 2021-04-15 VITALS
WEIGHT: 136 LBS | OXYGEN SATURATION: 100 % | HEART RATE: 70 BPM | SYSTOLIC BLOOD PRESSURE: 106 MMHG | TEMPERATURE: 98 F | BODY MASS INDEX: 22.66 KG/M2 | DIASTOLIC BLOOD PRESSURE: 75 MMHG | HEIGHT: 65 IN | RESPIRATION RATE: 20 BRPM

## 2021-04-15 DIAGNOSIS — Z92.29 STATUS POST ADMINISTRATION OF ALL DOSES OF COVID-19 VACCINE SERIES: ICD-10-CM

## 2021-04-15 DIAGNOSIS — M79.89 ARM SWELLING: Primary | ICD-10-CM

## 2021-04-15 LAB
ALBUMIN SERPL-MCNC: 4.14 G/DL (ref 3.5–5.2)
ALBUMIN/GLOB SERPL: 1.4 G/DL
ALP SERPL-CCNC: 100 U/L (ref 39–117)
ALT SERPL W P-5'-P-CCNC: 11 U/L (ref 1–33)
ANION GAP SERPL CALCULATED.3IONS-SCNC: 10.3 MMOL/L (ref 5–15)
AST SERPL-CCNC: 15 U/L (ref 1–32)
BACTERIA UR QL AUTO: ABNORMAL /HPF
BASOPHILS # BLD AUTO: 0.12 10*3/MM3 (ref 0–0.2)
BASOPHILS NFR BLD AUTO: 1.2 % (ref 0–1.5)
BILIRUB SERPL-MCNC: 0.2 MG/DL (ref 0–1.2)
BILIRUB UR QL STRIP: NEGATIVE
BUN SERPL-MCNC: 7 MG/DL (ref 6–20)
BUN/CREAT SERPL: 13.2 (ref 7–25)
CALCIUM SPEC-SCNC: 9.1 MG/DL (ref 8.6–10.5)
CHLORIDE SERPL-SCNC: 108 MMOL/L (ref 98–107)
CLARITY UR: CLEAR
CO2 SERPL-SCNC: 21.7 MMOL/L (ref 22–29)
COLOR UR: YELLOW
CREAT SERPL-MCNC: 0.53 MG/DL (ref 0.57–1)
CRP SERPL-MCNC: <0.3 MG/DL (ref 0–0.5)
DEPRECATED RDW RBC AUTO: 46 FL (ref 37–54)
EOSINOPHIL # BLD AUTO: 0.11 10*3/MM3 (ref 0–0.4)
EOSINOPHIL NFR BLD AUTO: 1.1 % (ref 0.3–6.2)
ERYTHROCYTE [DISTWIDTH] IN BLOOD BY AUTOMATED COUNT: 12.1 % (ref 12.3–15.4)
ERYTHROCYTE [SEDIMENTATION RATE] IN BLOOD: 9 MM/HR (ref 0–30)
GFR SERPL CREATININE-BSD FRML MDRD: 121 ML/MIN/1.73
GLOBULIN UR ELPH-MCNC: 3 GM/DL
GLUCOSE SERPL-MCNC: 99 MG/DL (ref 65–99)
GLUCOSE UR STRIP-MCNC: NEGATIVE MG/DL
HCT VFR BLD AUTO: 42.4 % (ref 34–46.6)
HGB BLD-MCNC: 14.3 G/DL (ref 12–15.9)
HGB UR QL STRIP.AUTO: ABNORMAL
HYALINE CASTS UR QL AUTO: ABNORMAL /LPF
IMM GRANULOCYTES # BLD AUTO: 0.02 10*3/MM3 (ref 0–0.05)
IMM GRANULOCYTES NFR BLD AUTO: 0.2 % (ref 0–0.5)
KETONES UR QL STRIP: NEGATIVE
LEUKOCYTE ESTERASE UR QL STRIP.AUTO: NEGATIVE
LYMPHOCYTES # BLD AUTO: 3.88 10*3/MM3 (ref 0.7–3.1)
LYMPHOCYTES NFR BLD AUTO: 39.7 % (ref 19.6–45.3)
MCH RBC QN AUTO: 34.4 PG (ref 26.6–33)
MCHC RBC AUTO-ENTMCNC: 33.7 G/DL (ref 31.5–35.7)
MCV RBC AUTO: 101.9 FL (ref 79–97)
MONOCYTES # BLD AUTO: 0.58 10*3/MM3 (ref 0.1–0.9)
MONOCYTES NFR BLD AUTO: 5.9 % (ref 5–12)
NEUTROPHILS NFR BLD AUTO: 5.07 10*3/MM3 (ref 1.7–7)
NEUTROPHILS NFR BLD AUTO: 51.9 % (ref 42.7–76)
NITRITE UR QL STRIP: NEGATIVE
NRBC BLD AUTO-RTO: 0 /100 WBC (ref 0–0.2)
PH UR STRIP.AUTO: 6 [PH] (ref 5–8)
PLATELET # BLD AUTO: 332 10*3/MM3 (ref 140–450)
PMV BLD AUTO: 9.3 FL (ref 6–12)
POTASSIUM SERPL-SCNC: 4.2 MMOL/L (ref 3.5–5.2)
PROT SERPL-MCNC: 7.1 G/DL (ref 6–8.5)
PROT UR QL STRIP: NEGATIVE
RBC # BLD AUTO: 4.16 10*6/MM3 (ref 3.77–5.28)
RBC # UR: ABNORMAL /HPF
REF LAB TEST METHOD: ABNORMAL
SODIUM SERPL-SCNC: 140 MMOL/L (ref 136–145)
SP GR UR STRIP: <=1.005 (ref 1–1.03)
SQUAMOUS #/AREA URNS HPF: ABNORMAL /HPF
UROBILINOGEN UR QL STRIP: ABNORMAL
WBC # BLD AUTO: 9.78 10*3/MM3 (ref 3.4–10.8)
WBC UR QL AUTO: ABNORMAL /HPF

## 2021-04-15 PROCEDURE — 93971 EXTREMITY STUDY: CPT | Performed by: RADIOLOGY

## 2021-04-15 PROCEDURE — 99283 EMERGENCY DEPT VISIT LOW MDM: CPT

## 2021-04-15 PROCEDURE — 85025 COMPLETE CBC W/AUTO DIFF WBC: CPT | Performed by: PHYSICIAN ASSISTANT

## 2021-04-15 PROCEDURE — 86140 C-REACTIVE PROTEIN: CPT | Performed by: PHYSICIAN ASSISTANT

## 2021-04-15 PROCEDURE — 85652 RBC SED RATE AUTOMATED: CPT | Performed by: PHYSICIAN ASSISTANT

## 2021-04-15 PROCEDURE — 81001 URINALYSIS AUTO W/SCOPE: CPT | Performed by: PHYSICIAN ASSISTANT

## 2021-04-15 PROCEDURE — 93971 EXTREMITY STUDY: CPT

## 2021-04-15 PROCEDURE — 80053 COMPREHEN METABOLIC PANEL: CPT | Performed by: PHYSICIAN ASSISTANT

## 2021-04-15 NOTE — ED PROVIDER NOTES
Subjective   55-year-old female who presents to the emergency department chief complaint of right arm swelling and tenderness.  Patient states this has been present since receiving the [employer] vaccination 1 month ago.  Patient states she had immediate swelling and tenderness to her right deltoid.  States she is continued to have pain and swelling over the past month.      History provided by:  Patient   used: No    Arm Swelling  Location:  Arm  Arm location:  R arm  Injury: no    Pain details:     Quality:  Aching and throbbing    Severity:  Moderate    Onset quality:  Gradual    Timing:  Intermittent    Progression:  Worsening  Dislocation: no    Foreign body present:  No foreign bodies  Tetanus status:  Unknown  Prior injury to area:  No  Relieved by:  Nothing  Worsened by:  Nothing  Ineffective treatments:  None tried  Associated symptoms: decreased range of motion and swelling    Associated symptoms: no fatigue, no fever, no muscle weakness and no neck pain    Risk factors: no concern for non-accidental trauma, no known bone disorder, no frequent fractures and no recent illness        Review of Systems   Constitutional: Negative.  Negative for chills, diaphoresis, fatigue and fever.   Eyes: Negative.  Negative for discharge and itching.   Respiratory: Negative.  Negative for apnea, choking, chest tightness and shortness of breath.    Cardiovascular: Negative.  Negative for chest pain and leg swelling.   Gastrointestinal: Negative.  Negative for anal bleeding, blood in stool, constipation and diarrhea.   Genitourinary: Negative.  Negative for difficulty urinating, dyspareunia and dysuria.   Musculoskeletal: Positive for joint swelling and myalgias. Negative for neck pain.   Skin: Negative.  Negative for color change, pallor, rash and wound.   Neurological: Negative.  Negative for seizures, syncope, light-headedness and headaches.   Hematological: Negative.  Does not bruise/bleed  easily.   Psychiatric/Behavioral: Negative.  Negative for behavioral problems, confusion, decreased concentration, dysphoric mood and hallucinations. The patient is not hyperactive.    All other systems reviewed and are negative.      Past Medical History:   Diagnosis Date   • Anxiety 06/25/2018   • Arthritis    • COPD (chronic obstructive pulmonary disease) (CMS/HCC)    • Fibromyalgia    • Migraines    • RSD (reflex sympathetic dystrophy)        Allergies   Allergen Reactions   • Floxin [Ofloxacin]        History reviewed. No pertinent surgical history.    Family History   Problem Relation Age of Onset   • Hypertension Mother    • Hypertension Father    • Heart disease Sister    • Diabetes Sister    • Breast cancer Neg Hx        Social History     Socioeconomic History   • Marital status:      Spouse name: Not on file   • Number of children: Not on file   • Years of education: Not on file   • Highest education level: Not on file   Tobacco Use   • Smoking status: Current Every Day Smoker     Packs/day: 1.00     Types: Cigarettes   • Smokeless tobacco: Never Used   • Tobacco comment: smoked about 2 ppd for about 15 years. recently cut down to 1/2 ppd.   Vaping Use   • Vaping Use: Never used   Substance and Sexual Activity   • Alcohol use: No   • Drug use: No   • Sexual activity: Defer           Objective   Physical Exam  Vitals and nursing note reviewed.   Constitutional:       General: She is not in acute distress.     Appearance: Normal appearance. She is normal weight. She is not ill-appearing, toxic-appearing or diaphoretic.   HENT:      Head: Normocephalic and atraumatic.      Right Ear: Tympanic membrane, ear canal and external ear normal.      Left Ear: Tympanic membrane, ear canal and external ear normal.      Nose: Nose normal. No congestion or rhinorrhea.      Mouth/Throat:      Mouth: Mucous membranes are moist.      Pharynx: Oropharynx is clear. No oropharyngeal exudate or posterior oropharyngeal  erythema.   Eyes:      General: No scleral icterus.        Right eye: No discharge.         Left eye: No discharge.      Extraocular Movements: Extraocular movements intact.      Conjunctiva/sclera: Conjunctivae normal.      Pupils: Pupils are equal, round, and reactive to light.   Neck:      Vascular: No carotid bruit.   Cardiovascular:      Rate and Rhythm: Normal rate and regular rhythm.      Pulses: Normal pulses.      Heart sounds: Normal heart sounds. No murmur heard.   No friction rub. No gallop.    Pulmonary:      Effort: Pulmonary effort is normal. No respiratory distress.      Breath sounds: Normal breath sounds. No stridor. No wheezing, rhonchi or rales.   Chest:      Chest wall: No tenderness.   Abdominal:      General: Abdomen is flat. There is no distension.      Palpations: Abdomen is soft. There is no mass.      Tenderness: There is no abdominal tenderness. There is no right CVA tenderness, left CVA tenderness, guarding or rebound.      Hernia: No hernia is present.   Musculoskeletal:         General: Swelling and tenderness present. No deformity or signs of injury.      Cervical back: Normal range of motion. No rigidity.      Right lower leg: No edema.   Lymphadenopathy:      Cervical: No cervical adenopathy.   Skin:     General: Skin is warm and dry.      Capillary Refill: Capillary refill takes less than 2 seconds.      Coloration: Skin is not jaundiced or pale.      Findings: No bruising, erythema, lesion or rash.   Neurological:      General: No focal deficit present.      Mental Status: She is alert and oriented to person, place, and time. Mental status is at baseline.      Cranial Nerves: No cranial nerve deficit.      Sensory: No sensory deficit.      Motor: No weakness.      Coordination: Coordination normal.      Gait: Gait normal.      Deep Tendon Reflexes: Reflexes normal.   Psychiatric:         Mood and Affect: Mood normal.         Behavior: Behavior normal.         Thought Content:  Thought content normal.         Judgment: Judgment normal.         Procedures           ED Course  ED Course as of Apr 15 1115   Thu Apr 15, 2021   1045 IMPRESSION:    Normal right upper extremity duplex venous ultrasound.    []      ED Course User Index  [] Roland Guzmán PA-C                                           [facility]    Final diagnoses:   Arm swelling   Status post administration of all doses of COVID-19 vaccine series       ED Disposition  ED Disposition     ED Disposition Condition Comment    Discharge Stable           Yvonne Marr MD  [address] [facility] [address] [phone]    Call in 1 day           Medication List      No changes were made to your prescriptions during this visit.          Roland Guzmán PA-C  04/15/21 1115

## 2021-05-25 ENCOUNTER — TRANSCRIBE ORDERS (OUTPATIENT)
Dept: ADMINISTRATIVE | Facility: HOSPITAL | Age: 56
End: 2021-05-25

## 2021-05-25 ENCOUNTER — HOSPITAL ENCOUNTER (OUTPATIENT)
Dept: GENERAL RADIOLOGY | Facility: HOSPITAL | Age: 56
Discharge: HOME | End: 2021-05-25
Admitting: INTERNAL MEDICINE

## 2021-05-25 DIAGNOSIS — M25.511 RIGHT SHOULDER PAIN, UNSPECIFIED CHRONICITY: ICD-10-CM

## 2021-05-25 DIAGNOSIS — M25.511 RIGHT SHOULDER PAIN, UNSPECIFIED CHRONICITY: Primary | ICD-10-CM

## 2021-05-25 PROCEDURE — 73030 X-RAY EXAM OF SHOULDER: CPT

## 2021-05-25 PROCEDURE — 73030 X-RAY EXAM OF SHOULDER: CPT | Performed by: RADIOLOGY

## 2021-09-14 ENCOUNTER — TRANSCRIBE ORDERS (OUTPATIENT)
Dept: ADMINISTRATIVE | Facility: HOSPITAL | Age: 56
End: 2021-09-14

## 2021-09-20 ENCOUNTER — TRANSCRIBE ORDERS (OUTPATIENT)
Dept: ADMINISTRATIVE | Facility: HOSPITAL | Age: 56
End: 2021-09-20

## 2021-09-20 DIAGNOSIS — M79.601 RIGHT ARM PAIN: Primary | ICD-10-CM

## 2021-09-24 ENCOUNTER — TRANSCRIBE ORDERS (OUTPATIENT)
Dept: ADMINISTRATIVE | Facility: HOSPITAL | Age: 56
End: 2021-09-24

## 2021-09-24 DIAGNOSIS — M79.601 RIGHT UPPER LIMB PAIN: Primary | ICD-10-CM

## 2021-09-27 ENCOUNTER — HOSPITAL ENCOUNTER (OUTPATIENT)
Dept: GENERAL RADIOLOGY | Facility: HOSPITAL | Age: 56
Discharge: HOME | End: 2021-09-27
Admitting: INTERNAL MEDICINE

## 2021-09-27 DIAGNOSIS — M79.601 RIGHT UPPER LIMB PAIN: ICD-10-CM

## 2021-09-27 PROCEDURE — 73060 X-RAY EXAM OF HUMERUS: CPT

## 2021-09-27 PROCEDURE — 73060 X-RAY EXAM OF HUMERUS: CPT | Performed by: RADIOLOGY

## 2021-09-27 PROCEDURE — 73030 X-RAY EXAM OF SHOULDER: CPT | Performed by: RADIOLOGY

## 2021-09-27 PROCEDURE — 73030 X-RAY EXAM OF SHOULDER: CPT

## 2021-12-10 ENCOUNTER — HOSPITAL ENCOUNTER (EMERGENCY)
Facility: HOSPITAL | Age: 56
Discharge: HOME | End: 2021-12-10
Attending: STUDENT IN AN ORGANIZED HEALTH CARE EDUCATION/TRAINING PROGRAM | Admitting: STUDENT IN AN ORGANIZED HEALTH CARE EDUCATION/TRAINING PROGRAM

## 2021-12-10 ENCOUNTER — APPOINTMENT (OUTPATIENT)
Dept: GENERAL RADIOLOGY | Facility: HOSPITAL | Age: 56
End: 2021-12-10

## 2021-12-10 VITALS
OXYGEN SATURATION: 93 % | HEART RATE: 96 BPM | HEIGHT: 65 IN | TEMPERATURE: 98.7 F | WEIGHT: 139 LBS | DIASTOLIC BLOOD PRESSURE: 60 MMHG | BODY MASS INDEX: 23.16 KG/M2 | RESPIRATION RATE: 18 BRPM | SYSTOLIC BLOOD PRESSURE: 121 MMHG

## 2021-12-10 DIAGNOSIS — R07.9 CHEST PAIN IN ADULT: ICD-10-CM

## 2021-12-10 DIAGNOSIS — R94.31 QT PROLONGATION: Primary | ICD-10-CM

## 2021-12-10 LAB
ALBUMIN SERPL-MCNC: 3.97 G/DL (ref 3.5–5.2)
ALBUMIN/GLOB SERPL: 1.6 G/DL
ALP SERPL-CCNC: 99 U/L (ref 39–117)
ALT SERPL W P-5'-P-CCNC: 21 U/L (ref 1–33)
ANION GAP SERPL CALCULATED.3IONS-SCNC: 11 MMOL/L (ref 5–15)
AST SERPL-CCNC: 18 U/L (ref 1–32)
BASOPHILS # BLD AUTO: 0.05 10*3/MM3 (ref 0–0.2)
BASOPHILS NFR BLD AUTO: 0.6 % (ref 0–1.5)
BILIRUB SERPL-MCNC: 0.3 MG/DL (ref 0–1.2)
BUN SERPL-MCNC: 5 MG/DL (ref 6–20)
BUN/CREAT SERPL: 8.6 (ref 7–25)
CALCIUM SPEC-SCNC: 8.4 MG/DL (ref 8.6–10.5)
CHLORIDE SERPL-SCNC: 105 MMOL/L (ref 98–107)
CO2 SERPL-SCNC: 20 MMOL/L (ref 22–29)
CREAT SERPL-MCNC: 0.58 MG/DL (ref 0.57–1)
D DIMER PPP FEU-MCNC: <0.27 MCGFEU/ML (ref 0–0.5)
DEPRECATED RDW RBC AUTO: 46.7 FL (ref 37–54)
EOSINOPHIL # BLD AUTO: 0.01 10*3/MM3 (ref 0–0.4)
EOSINOPHIL NFR BLD AUTO: 0.1 % (ref 0.3–6.2)
ERYTHROCYTE [DISTWIDTH] IN BLOOD BY AUTOMATED COUNT: 12.3 % (ref 12.3–15.4)
GFR SERPL CREATININE-BSD FRML MDRD: 108 ML/MIN/1.73
GLOBULIN UR ELPH-MCNC: 2.4 GM/DL
GLUCOSE SERPL-MCNC: 102 MG/DL (ref 65–99)
HCT VFR BLD AUTO: 40.2 % (ref 34–46.6)
HGB BLD-MCNC: 13.7 G/DL (ref 12–15.9)
HOLD SPECIMEN: NORMAL
HOLD SPECIMEN: NORMAL
IMM GRANULOCYTES # BLD AUTO: 0.03 10*3/MM3 (ref 0–0.05)
IMM GRANULOCYTES NFR BLD AUTO: 0.4 % (ref 0–0.5)
LYMPHOCYTES # BLD AUTO: 1.39 10*3/MM3 (ref 0.7–3.1)
LYMPHOCYTES NFR BLD AUTO: 17 % (ref 19.6–45.3)
MCH RBC QN AUTO: 34.6 PG (ref 26.6–33)
MCHC RBC AUTO-ENTMCNC: 34.1 G/DL (ref 31.5–35.7)
MCV RBC AUTO: 101.5 FL (ref 79–97)
MONOCYTES # BLD AUTO: 0.48 10*3/MM3 (ref 0.1–0.9)
MONOCYTES NFR BLD AUTO: 5.9 % (ref 5–12)
NEUTROPHILS NFR BLD AUTO: 6.2 10*3/MM3 (ref 1.7–7)
NEUTROPHILS NFR BLD AUTO: 76 % (ref 42.7–76)
NRBC BLD AUTO-RTO: 0 /100 WBC (ref 0–0.2)
PLATELET # BLD AUTO: 307 10*3/MM3 (ref 140–450)
PMV BLD AUTO: 9.1 FL (ref 6–12)
POTASSIUM SERPL-SCNC: 3.8 MMOL/L (ref 3.5–5.2)
PROT SERPL-MCNC: 6.4 G/DL (ref 6–8.5)
QT INTERVAL: 344 MS
QT INTERVAL: 436 MS
QTC INTERVAL: 463 MS
QTC INTERVAL: 608 MS
RBC # BLD AUTO: 3.96 10*6/MM3 (ref 3.77–5.28)
SODIUM SERPL-SCNC: 136 MMOL/L (ref 136–145)
TROPONIN T SERPL-MCNC: <0.01 NG/ML (ref 0–0.03)
WBC NRBC COR # BLD: 8.16 10*3/MM3 (ref 3.4–10.8)
WHOLE BLOOD HOLD SPECIMEN: NORMAL
WHOLE BLOOD HOLD SPECIMEN: NORMAL

## 2021-12-10 PROCEDURE — 93010 ELECTROCARDIOGRAM REPORT: CPT | Performed by: INTERNAL MEDICINE

## 2021-12-10 PROCEDURE — 84484 ASSAY OF TROPONIN QUANT: CPT | Performed by: PHYSICIAN ASSISTANT

## 2021-12-10 PROCEDURE — 96366 THER/PROPH/DIAG IV INF ADDON: CPT

## 2021-12-10 PROCEDURE — 25010000002 MAGNESIUM SULFATE 2 GM/50ML SOLUTION: Performed by: PHYSICIAN ASSISTANT

## 2021-12-10 PROCEDURE — 80053 COMPREHEN METABOLIC PANEL: CPT | Performed by: PHYSICIAN ASSISTANT

## 2021-12-10 PROCEDURE — 85379 FIBRIN DEGRADATION QUANT: CPT | Performed by: PHYSICIAN ASSISTANT

## 2021-12-10 PROCEDURE — 71045 X-RAY EXAM CHEST 1 VIEW: CPT | Performed by: RADIOLOGY

## 2021-12-10 PROCEDURE — 96365 THER/PROPH/DIAG IV INF INIT: CPT

## 2021-12-10 PROCEDURE — 93005 ELECTROCARDIOGRAM TRACING: CPT | Performed by: STUDENT IN AN ORGANIZED HEALTH CARE EDUCATION/TRAINING PROGRAM

## 2021-12-10 PROCEDURE — 85025 COMPLETE CBC W/AUTO DIFF WBC: CPT | Performed by: PHYSICIAN ASSISTANT

## 2021-12-10 PROCEDURE — 93005 ELECTROCARDIOGRAM TRACING: CPT | Performed by: PHYSICIAN ASSISTANT

## 2021-12-10 PROCEDURE — 71045 X-RAY EXAM CHEST 1 VIEW: CPT

## 2021-12-10 PROCEDURE — 99284 EMERGENCY DEPT VISIT MOD MDM: CPT

## 2021-12-10 RX ORDER — MAGNESIUM SULFATE HEPTAHYDRATE 40 MG/ML
2 INJECTION, SOLUTION INTRAVENOUS ONCE
Status: COMPLETED | OUTPATIENT
Start: 2021-12-10 | End: 2021-12-10

## 2021-12-10 RX ORDER — SODIUM CHLORIDE 0.9 % (FLUSH) 0.9 %
10 SYRINGE (ML) INJECTION AS NEEDED
Status: DISCONTINUED | OUTPATIENT
Start: 2021-12-10 | End: 2021-12-10 | Stop reason: HOSPADM

## 2021-12-10 RX ORDER — LABETALOL HYDROCHLORIDE 5 MG/ML
40 INJECTION, SOLUTION INTRAVENOUS ONCE
Status: DISCONTINUED | OUTPATIENT
Start: 2021-12-10 | End: 2021-12-10

## 2021-12-10 RX ORDER — ASPIRIN 81 MG/1
324 TABLET, CHEWABLE ORAL ONCE
Status: COMPLETED | OUTPATIENT
Start: 2021-12-10 | End: 2021-12-10

## 2021-12-10 RX ADMIN — ASPIRIN 324 MG: 81 TABLET, CHEWABLE ORAL at 12:21

## 2021-12-10 RX ADMIN — SODIUM CHLORIDE 1000 ML: 9 INJECTION, SOLUTION INTRAVENOUS at 12:22

## 2021-12-10 RX ADMIN — MAGNESIUM SULFATE HEPTAHYDRATE 2 G: 40 INJECTION, SOLUTION INTRAVENOUS at 12:48

## 2021-12-10 NOTE — ED PROVIDER NOTES
Subjective   This is a 56-year-old female who presents to the emergency department with chief complaint chest pain, shortness of breath that started approximately 12 hours ago.  Patient reports receiving her Pfizer booster vaccination x1 day ago.  States that she started having increased pain.  States that her pain is worse with breathing.  Patient denies any fever, chills, body aches.  Patient has history of COPD, fibromyalgia, migraines.      History provided by:  Patient   used: No    Chest Pain  Pain location:  L chest  Pain quality: stabbing    Pain radiates to:  Does not radiate  Pain severity:  Moderate  Onset quality:  Gradual  Duration:  1 day  Timing:  Intermittent  Progression:  Worsening  Chronicity:  New  Context: not breathing, not drug use, not eating, not intercourse, not lifting and not movement    Relieved by:  Nothing  Worsened by:  Nothing  Ineffective treatments:  None tried  Associated symptoms: anxiety, cough, lower extremity edema and shortness of breath    Associated symptoms: no abdominal pain, no back pain, no fatigue and no fever    Risk factors: high cholesterol    Risk factors: no coronary artery disease, no hypertension, not pregnant, no prior DVT/PE and no smoking        Review of Systems   Constitutional: Negative.  Negative for fatigue and fever.   HENT: Negative.  Negative for ear pain, facial swelling, hearing loss, mouth sores, nosebleeds and postnasal drip.    Eyes: Negative.  Negative for photophobia, pain and itching.   Respiratory: Positive for cough, chest tightness and shortness of breath.    Cardiovascular: Positive for chest pain.   Gastrointestinal: Negative.  Negative for abdominal pain, anal bleeding, blood in stool, constipation and diarrhea.   Endocrine: Negative.  Negative for heat intolerance and polyuria.   Genitourinary: Negative.  Negative for flank pain, frequency, genital sores, hematuria, menstrual problem, pelvic pain and urgency.    Musculoskeletal: Negative.  Negative for back pain, gait problem, joint swelling and myalgias.   Skin: Negative.  Negative for color change and pallor.   All other systems reviewed and are negative.      Past Medical History:   Diagnosis Date   • Anxiety 06/25/2018   • Arthritis    • COPD (chronic obstructive pulmonary disease) (CMS/HCC)    • Fibromyalgia    • Migraines    • RSD (reflex sympathetic dystrophy)        Allergies   Allergen Reactions   • Floxin [Ofloxacin]        No past surgical history on file.    Family History   Problem Relation Age of Onset   • Hypertension Mother    • Hypertension Father    • Heart disease Sister    • Diabetes Sister    • Breast cancer Neg Hx        Social History     Socioeconomic History   • Marital status:    Tobacco Use   • Smoking status: Current Every Day Smoker     Packs/day: 1.00     Types: Cigarettes   • Smokeless tobacco: Never Used   • Tobacco comment: smoked about 2 ppd for about 15 years. recently cut down to 1/2 ppd.   Vaping Use   • Vaping Use: Never used   Substance and Sexual Activity   • Alcohol use: No   • Drug use: No   • Sexual activity: Defer           Objective   Physical Exam  Vitals and nursing note reviewed.   Constitutional:       General: She is not in acute distress.     Appearance: She is well-developed and normal weight. She is not ill-appearing or toxic-appearing.   HENT:      Head: Normocephalic and atraumatic.   Eyes:      Extraocular Movements: Extraocular movements intact.      Pupils: Pupils are equal, round, and reactive to light.   Neck:      Thyroid: No thyromegaly.      Vascular: No hepatojugular reflux or JVD.      Trachea: No tracheal deviation.   Cardiovascular:      Rate and Rhythm: Normal rate and regular rhythm.  No extrasystoles are present.     Pulses:           Carotid pulses are 2+ on the right side and 2+ on the left side.       Radial pulses are 2+ on the right side and 2+ on the left side.        Dorsalis pedis pulses  are 2+ on the right side and 2+ on the left side.        Posterior tibial pulses are 2+ on the right side and 2+ on the left side.      Heart sounds: Normal heart sounds. Heart sounds not distant. No murmur heard.  No friction rub.   Pulmonary:      Effort: Pulmonary effort is normal. No tachypnea.   Chest:      Chest wall: No mass, deformity, tenderness, crepitus or edema.   Abdominal:      General: Bowel sounds are normal. There is no abdominal bruit.      Palpations: Abdomen is soft. There is no fluid wave, hepatomegaly, splenomegaly or mass.      Tenderness: There is no abdominal tenderness. There is no guarding or rebound.   Musculoskeletal:         General: Normal range of motion.      Cervical back: Normal range of motion and neck supple.      Right lower leg: No tenderness. No edema.      Left lower leg: No tenderness. No edema.   Lymphadenopathy:      Cervical: No cervical adenopathy.   Skin:     General: Skin is warm and dry.      Capillary Refill: Capillary refill takes less than 2 seconds.      Coloration: Skin is not cyanotic or pale.      Findings: No ecchymosis, erythema or rash.      Nails: There is no clubbing.   Neurological:      General: No focal deficit present.      Mental Status: She is alert and oriented to person, place, and time.      Cranial Nerves: No cranial nerve deficit.      Motor: No weakness.   Psychiatric:         Mood and Affect: Mood normal. Mood is not anxious.         Behavior: Behavior normal. Behavior is not agitated.         Procedures           ED Course  ED Course as of 12/16/21 0816   Fri Dec 10, 2021   1138 EKG at 1135 hrs., sinus tachycardia 117 bpm, MS <200 MS, QRS 68, QTc 608, borderline right axis deviation.  No significant ST deviation or T wave abnormalities concerning for acute ischemia. [KP]   1205 Discussed QT prolongation with Dr. Sanchez cardiology agrees with plan for initiate 2 grams IV magnesium.  Start fluid bolus, check labs.  Patient brought back  urgently to room. []   1226 EKG at 12:20 PM, sinus tachycardia 109 bpm, , QRS 79, QTc 475.  This QTc actually appears consistent with leads I and aVL, however we will proceed with magnesium and continue assessment.  Dr. Sanchez updated. []   1335 IMPRESSION:    Stable chest. No acute changes identified. []   1342   IMPRESSION:    Stable chest. No acute changes identified. []   1546 Patient refused to have second troponin drawn.  Patient came in with left-sided chest pain worsened with the inspiration.  Patient did have negative cardiopulmonary work-up.  This included negative troponin x1 and negative D-dimer. []      ED Course User Index  [] Roland Guzmán PA-C  [] Orlando Davis MD                                                 [facility]    Final diagnoses:   QT prolongation   Chest pain in adult       ED Disposition  ED Disposition     ED Disposition Condition Comment    Discharge Stable           Yvonne Marr MD  [address]  [id number]  [facility] [address] [phone]    Call in 1 day           Medication List      No changes were made to your prescriptions during this visit.          Roland Guzmán PA-C  12/16/21 0816

## 2021-12-10 NOTE — ED NOTES
Went to collect trop, pt refused, said she is ready to go home if all her other tests she has had done are okay.      Renee Novak, PCT  12/10/21 2243

## 2022-02-02 ENCOUNTER — HOSPITAL ENCOUNTER (EMERGENCY)
Facility: HOSPITAL | Age: 57
Discharge: LEFT BEFORE DISCHARGE | End: 2022-02-02
Admitting: EMERGENCY MEDICINE

## 2022-02-02 VITALS
SYSTOLIC BLOOD PRESSURE: 126 MMHG | DIASTOLIC BLOOD PRESSURE: 99 MMHG | BODY MASS INDEX: 23.16 KG/M2 | TEMPERATURE: 96.2 F | HEIGHT: 65 IN | WEIGHT: 139 LBS | HEART RATE: 107 BPM | RESPIRATION RATE: 15 BRPM | OXYGEN SATURATION: 99 %

## 2022-02-02 PROCEDURE — 99281 EMR DPT VST MAYX REQ PHY/QHP: CPT

## 2022-02-02 NOTE — ED NOTES
MEDICAL SCREENING:    Reason for Visit: abdominal pain x 2 weeks, worsening.    Patient initially seen in triage.  The patient was advised further evaluation and diagnostic testing will be needed, some of the treatment and testing will be initiated in the lobby in order to begin the process.  The patient will be returned to the waiting area for the time being and possibly be re-assessed by a subsequent ED provider.  The patient will be brought back to the treatment area in as timely manner as possible.         Scott Hernandez, PA-C  02/02/22 0951

## 2022-02-02 NOTE — ED NOTES
Called patient for reassessment, no answer x 3. Unable to locate patient in ED lobby or surrounding areas.     Alta Lopez RN  02/02/22 [id number]

## 2022-02-02 NOTE — ED NOTES
Called patient for reassessment, no answer x 3.  Unable to locate pt in ED lobby and surrounding areas.     Alta Lopez RN  02/02/22 3494

## 2022-02-22 DIAGNOSIS — Z12.11 ENCOUNTER FOR SCREENING FOR MALIGNANT NEOPLASM OF COLON: Primary | ICD-10-CM

## 2022-04-08 ENCOUNTER — LAB (OUTPATIENT)
Dept: LAB | Facility: HOSPITAL | Age: 57
End: 2022-04-08

## 2022-04-08 DIAGNOSIS — Z12.11 ENCOUNTER FOR SCREENING FOR MALIGNANT NEOPLASM OF COLON: ICD-10-CM

## 2022-04-12 DIAGNOSIS — Z12.11 ENCOUNTER FOR SCREENING FOR MALIGNANT NEOPLASM OF COLON: Primary | ICD-10-CM

## 2022-05-16 ENCOUNTER — LAB (OUTPATIENT)
Dept: LAB | Facility: HOSPITAL | Age: 57
End: 2022-05-16

## 2022-05-16 DIAGNOSIS — Z12.11 ENCOUNTER FOR SCREENING FOR MALIGNANT NEOPLASM OF COLON: ICD-10-CM

## 2022-06-02 ENCOUNTER — OFFICE VISIT (OUTPATIENT)
Dept: PULMONOLOGY | Facility: CLINIC | Age: 57
End: 2022-06-02

## 2022-06-02 VITALS
OXYGEN SATURATION: 92 % | SYSTOLIC BLOOD PRESSURE: 148 MMHG | WEIGHT: 143.6 LBS | HEART RATE: 98 BPM | BODY MASS INDEX: 23.93 KG/M2 | HEIGHT: 65 IN | DIASTOLIC BLOOD PRESSURE: 90 MMHG | TEMPERATURE: 96.9 F

## 2022-06-02 DIAGNOSIS — G47.34 NOCTURNAL HYPOXIA: ICD-10-CM

## 2022-06-02 DIAGNOSIS — J43.2 CENTRILOBULAR EMPHYSEMA: Primary | ICD-10-CM

## 2022-06-02 DIAGNOSIS — F17.210 CIGARETTE NICOTINE DEPENDENCE WITHOUT COMPLICATION: ICD-10-CM

## 2022-06-02 DIAGNOSIS — G47.33 OSA (OBSTRUCTIVE SLEEP APNEA): ICD-10-CM

## 2022-06-02 DIAGNOSIS — Z12.2 ENCOUNTER FOR SCREENING FOR LUNG CANCER: ICD-10-CM

## 2022-06-02 PROCEDURE — 99214 OFFICE O/P EST MOD 30 MIN: CPT | Performed by: NURSE PRACTITIONER

## 2022-06-02 NOTE — PROGRESS NOTES
"Chief Complaint  Follow-up, COPD, Cough, Shortness of Breath, Fatigue, and Chest Pain    Subjective        Basilia Coco Chamberlain presents to [facility] PULMONARY & CRITICAL CARE MEDICINE  History of Present Illness     Ms. Chamberlain is a 56 year old male with a medical history significant for anxiety, arthritis, COPD, fibromyalgia, and migraines.     She presents today for a routine follow up on COPD.  She states that she feels that her breathing has been getting worse.  She states that she smother very bad at night despite using her supplemental oxygen.  She states that she does not know what kind of inhaler she is using.  She states that she is using albuterol every 4 hours as needed.  She remains a current smoker of about 1/2 ppd.  She initially presented to my office in 2020 with a current diagnosis of centrilobular emphysema.  Since this time she has underwent PFT testing as well as imaging and sleep studies to rule out causes for continued shortness of breath.  She reports reports worsening shortness of breath upon exertion and when lying flat, despite mostly normal PFT testing, chest imaging and sleep study.  She is compliant with current inhaler regimen which does not seem to provide any relief of symptoms.    Objective   Vital Signs:  /90 (BP Location: Left arm, Patient Position: Sitting, Cuff Size: Adult)   Pulse 98   Temp 96.9 °F (36.1 °C) (Temporal)   Ht 165.1 cm (65\")   Wt 65.1 kg (143 lb 9.6 oz)   SpO2 92%   BMI 23.90 kg/m²     BMI is within normal parameters. No other follow-up for BMI required.      Physical Exam     GENERAL APPEARANCE: Well developed, well nourished, alert and cooperative, and appears to be in no acute distress.    HEAD: normocephalic. Atraumatic.    EYES: PERRL, EOMI. Vision is grossly intact.    THROAT: Oral cavity and pharynx normal. No inflammation, swelling, exudate, or lesions.     NECK: Neck supple.  No thyromegaly.    CARDIAC: Normal S1 and " S2. No S3, S4 or murmurs. Rhythm is regular.     RESPIRATORY:Bilateral air entry positive. Bilateral diminished breath sounds. No wheezing, crackles or rhonchi noted.    GI: Positive bowel sounds. Soft, nondistended, nontender.     MUSCULOSKELETAL: No significant deformity or joint abnormality. No edema. Peripheral pulses intact. No varicosities.    NEUROLOGICAL: Strength and sensation symmetric and intact throughout.     PSYCHIATRIC: The mental examination revealed the patient was oriented to person, place, and time.     Result Review :  The following data was reviewed by: ALANA Nicholson on 06/02/2022:  Common labs    Common Labsle 12/10/21 12/10/21    1213 1333   Glucose  102 (A)   BUN  5 (A)   Creatinine  0.58   eGFR Non African Am  108   Sodium  136   Potassium  3.8   Chloride  105   Calcium  8.4 (A)   Albumin  3.97   Total Bilirubin  0.3   Alkaline Phosphatase  99   AST (SGOT)  18   ALT (SGPT)  21   WBC 8.16    Hemoglobin 13.7    Hematocrit 40.2    Platelets 307    (A) Abnormal value       Comments are available for some flowsheets but are not being displayed.                Assessment and Plan   Diagnoses and all orders for this visit:    1. Centrilobular emphysema (HCC) (Primary)  -     Full Pulmonary Function Test With Bronchodilator; Future  -     Adult Transthoracic Echo Complete W/ Cont if Necessary Per Protocol; Future    2. Nocturnal hypoxia    3. Cigarette nicotine dependence without complication  -     CT chest low dose wo; Future  -     Ambulatory Referral to Sleep Lab    4. ELIZABETH (obstructive sleep apnea)    5. Encounter for screening for lung cancer  -     CT chest low dose wo; Future  -     Ambulatory Referral to Sleep Lab           Patient presented in 2020 with an existing diagnosis of centrilobular emphysema, however after review of previous PFT testing and chest imaging, I am not convinced that centrilobular emphysema is the source of her shortness of breath.  PFT was completed in  2020 which showed no significant obstruction.  Some air trapping was noted which could be contributing to the patient's shortness of breath.  CT chest was also completed which showed mild centrilobular emphysema, however due to her ongoing symptoms with little control provided by current inhaler regimen and and hypoxia without severe lung disease I am concerned that she does have a valvular issue or an underlying cardiac cause.  Shortness of breath seems to be predominantly noted on exertion and at nighttime when laying down.  Previous sleep study has showed no evidence of sleep apnea.      -Continue albuterol every 4 hours as needed.  -Continue duonebs every 4 hours as needed.  -will contact her pharmacy to see which inhaler she is on.  -We will obtain new PFT testing to assess lung function as well as an echo to evaluate LV function.      Home sleep study showed no evidence of sleep apnea.  Will have her repeat sleep study in the lab.  Continue supplemental oxygen at night.     Patient was educated on positive airway pressure treatment.  As per CMS guidelines, more than 4 hours on 70% of observed nights is considered adherence. Patient was strongly encouraged to use CPAP as much as possible during sleep as more CPAP use is equal to more benefit. Use of heated humidification in positive airway pressure treatment to improve the adherence to the device.  In case of claustrophobia, we will provide the patient cognitive behavioral therapy and desensitization. Oral appliances use will be discussed with the patient in case of mild to moderate sleep apnea or if the patient with severe disease fail positive airway pressure treatment.       The patient was extensively educated on the consequences of untreated obstructive sleep apnea namely cardiovascular/metabolic disorder, neurocognitive deficit, daytime sleepiness, motor vehicle accidents, depression, mood disorders and reduced quality of life.  At the end of  conversation, the patient voices understanding of the disease process and treatment modality.  Patient also understands the risk of untreated obstructive sleep apnea and benefit benefits of the treatment.    Counseling time was greater than 10 minutes.        Current Smoker:    Currently smoking about 1/2 pdd.    Basilia Chamberlain  reports that she has been smoking cigarettes. She has been smoking about 1.00 pack per day. She has never used smokeless tobacco.. I have educated her on the risk of diseases from using tobacco products such as cancer, COPD and heart disease.     I advised her to quit and she is not willing to quit.         Ordered LCDT for lung cancer screening.         Follow Up   Return in about 3 months (around 9/2/2022).  Patient was given instructions and counseling regarding her condition or for health maintenance advice. Please see specific information pulled into the AVS if appropriate.

## 2022-06-28 ENCOUNTER — HOSPITAL ENCOUNTER (OUTPATIENT)
Dept: CT IMAGING | Facility: HOSPITAL | Age: 57
Discharge: HOME | End: 2022-06-28

## 2022-06-28 ENCOUNTER — HOSPITAL ENCOUNTER (OUTPATIENT)
Dept: CARDIOLOGY | Facility: HOSPITAL | Age: 57
Discharge: HOME | End: 2022-06-28

## 2022-06-28 ENCOUNTER — HOSPITAL ENCOUNTER (OUTPATIENT)
Dept: RESPIRATORY THERAPY | Facility: HOSPITAL | Age: 57
Discharge: HOME | End: 2022-06-28

## 2022-06-28 DIAGNOSIS — J43.2 CENTRILOBULAR EMPHYSEMA: ICD-10-CM

## 2022-06-28 DIAGNOSIS — F17.210 CIGARETTE NICOTINE DEPENDENCE WITHOUT COMPLICATION: ICD-10-CM

## 2022-06-28 DIAGNOSIS — Z12.2 ENCOUNTER FOR SCREENING FOR LUNG CANCER: ICD-10-CM

## 2022-06-28 LAB
BH CV ECHO MEAS - AO ROOT DIAM: 2.7 CM
BH CV ECHO MEAS - EDV(CUBED): 54.9 ML
BH CV ECHO MEAS - EDV(MOD-SP4): 18.9 ML
BH CV ECHO MEAS - EF(MOD-SP4): 50.6 %
BH CV ECHO MEAS - ESV(CUBED): 32.8 ML
BH CV ECHO MEAS - ESV(MOD-SP4): 9.3 ML
BH CV ECHO MEAS - FS: 15.8 %
BH CV ECHO MEAS - IVS/LVPW: 0.85 CM
BH CV ECHO MEAS - IVSD: 1.1 CM
BH CV ECHO MEAS - LA DIMENSION: 2.6 CM
BH CV ECHO MEAS - LAT PEAK E' VEL: 9.1 CM/SEC
BH CV ECHO MEAS - LV DIASTOLIC VOL/BSA (35-75): 11 CM2
BH CV ECHO MEAS - LV MASS(C)D: 153.2 GRAMS
BH CV ECHO MEAS - LV SYSTOLIC VOL/BSA (12-30): 5.4 CM2
BH CV ECHO MEAS - LVIDD: 3.8 CM
BH CV ECHO MEAS - LVIDS: 3.2 CM
BH CV ECHO MEAS - LVOT AREA: 3.1 CM2
BH CV ECHO MEAS - LVOT DIAM: 2 CM
BH CV ECHO MEAS - LVPWD: 0.9 CM
BH CV ECHO MEAS - MED PEAK E' VEL: 13.9 CM/SEC
BH CV ECHO MEAS - MV A MAX VEL: 114 CM/SEC
BH CV ECHO MEAS - MV E MAX VEL: 75.3 CM/SEC
BH CV ECHO MEAS - MV E/A: 0.66
BH CV ECHO MEAS - PA ACC TIME: 0.11 SEC
BH CV ECHO MEAS - PA PR(ACCEL): 31.3 MMHG
BH CV ECHO MEAS - SI(MOD-SP4): 5.6 ML/M2
BH CV ECHO MEAS - SV(MOD-SP4): 9.6 ML
BH CV ECHO MEASUREMENTS AVERAGE E/E' RATIO: 6.55
MAXIMAL PREDICTED HEART RATE: 166 BPM
STRESS TARGET HR: 141 BPM

## 2022-06-28 PROCEDURE — 94729 DIFFUSING CAPACITY: CPT | Performed by: INTERNAL MEDICINE

## 2022-06-28 PROCEDURE — 94060 EVALUATION OF WHEEZING: CPT | Performed by: INTERNAL MEDICINE

## 2022-06-28 PROCEDURE — 93306 TTE W/DOPPLER COMPLETE: CPT | Performed by: SPECIALIST

## 2022-06-28 PROCEDURE — 71271 CT THORAX LUNG CANCER SCR C-: CPT

## 2022-06-28 PROCEDURE — 94726 PLETHYSMOGRAPHY LUNG VOLUMES: CPT | Performed by: INTERNAL MEDICINE

## 2022-06-28 PROCEDURE — 94729 DIFFUSING CAPACITY: CPT

## 2022-06-28 PROCEDURE — 94726 PLETHYSMOGRAPHY LUNG VOLUMES: CPT

## 2022-06-28 PROCEDURE — 94060 EVALUATION OF WHEEZING: CPT

## 2022-06-28 PROCEDURE — 71271 CT THORAX LUNG CANCER SCR C-: CPT | Performed by: RADIOLOGY

## 2022-06-28 PROCEDURE — 93306 TTE W/DOPPLER COMPLETE: CPT

## 2022-06-30 ENCOUNTER — DOCUMENTATION (OUTPATIENT)
Dept: PULMONOLOGY | Facility: CLINIC | Age: 57
End: 2022-06-30

## 2022-06-30 DIAGNOSIS — R93.1 ABNORMAL ECHOCARDIOGRAM: Primary | ICD-10-CM

## 2022-06-30 NOTE — PROGRESS NOTES
Discussed CT chest, PFT and echo results with the patient.  I believe that her shortness of breath could be cardiac related.  I will refer her to cardiology for further work up.

## 2022-07-26 ENCOUNTER — TRANSCRIBE ORDERS (OUTPATIENT)
Dept: OTHER | Facility: OTHER | Age: 57
End: 2022-07-26

## 2022-07-26 ENCOUNTER — HOSPITAL ENCOUNTER (OUTPATIENT)
Dept: GENERAL RADIOLOGY | Facility: HOSPITAL | Age: 57
Discharge: HOME | End: 2022-07-26
Admitting: INTERNAL MEDICINE

## 2022-07-26 DIAGNOSIS — M25.561 RIGHT KNEE PAIN, UNSPECIFIED CHRONICITY: ICD-10-CM

## 2022-07-26 DIAGNOSIS — M25.561 RIGHT KNEE PAIN, UNSPECIFIED CHRONICITY: Primary | ICD-10-CM

## 2022-07-26 PROCEDURE — 73564 X-RAY EXAM KNEE 4 OR MORE: CPT | Performed by: RADIOLOGY

## 2022-07-26 PROCEDURE — 73564 X-RAY EXAM KNEE 4 OR MORE: CPT

## 2022-08-02 ENCOUNTER — OFFICE VISIT (OUTPATIENT)
Dept: CARDIOLOGY | Facility: CLINIC | Age: 57
End: 2022-08-02

## 2022-08-02 VITALS
BODY MASS INDEX: 23.66 KG/M2 | SYSTOLIC BLOOD PRESSURE: 138 MMHG | TEMPERATURE: 96.8 F | HEART RATE: 83 BPM | DIASTOLIC BLOOD PRESSURE: 88 MMHG | WEIGHT: 142 LBS | HEIGHT: 65 IN | OXYGEN SATURATION: 98 %

## 2022-08-02 DIAGNOSIS — R07.2 PRECORDIAL PAIN: Primary | ICD-10-CM

## 2022-08-02 DIAGNOSIS — E78.5 HYPERLIPIDEMIA, UNSPECIFIED HYPERLIPIDEMIA TYPE: ICD-10-CM

## 2022-08-02 DIAGNOSIS — R00.0 TACHYCARDIA: ICD-10-CM

## 2022-08-02 DIAGNOSIS — I31.39 PERICARDIAL EFFUSION: ICD-10-CM

## 2022-08-02 DIAGNOSIS — M79.89 LEG SWELLING: ICD-10-CM

## 2022-08-02 DIAGNOSIS — I51.89 DIASTOLIC DYSFUNCTION: ICD-10-CM

## 2022-08-02 PROCEDURE — 93242 EXT ECG>48HR<7D RECORDING: CPT | Performed by: INTERNAL MEDICINE

## 2022-08-02 PROCEDURE — 99214 OFFICE O/P EST MOD 30 MIN: CPT | Performed by: NURSE PRACTITIONER

## 2022-08-02 RX ORDER — TIOTROPIUM BROMIDE AND OLODATEROL 3.124; 2.736 UG/1; UG/1
SPRAY, METERED RESPIRATORY (INHALATION)
COMMUNITY
Start: 2022-07-26 | End: 2022-09-01 | Stop reason: SDUPTHER

## 2022-08-02 RX ORDER — IBUPROFEN 800 MG/1
TABLET ORAL
COMMUNITY
Start: 2022-07-26

## 2022-08-02 RX ORDER — LORATADINE 10 MG/1
TABLET ORAL
COMMUNITY
Start: 2022-07-26

## 2022-08-02 NOTE — PROGRESS NOTES
Subjective   Basilia Chamberlain is a 56 y.o. female.   Chief Complaint   Patient presents with   • Establish Care     Abnormal Echocardiogram   • abnormal Echocardiogram   • Chest Pain     Pt states she feels sharp twinges in her left sided chest    • Shortness of Breath      History of Present Illness   Basilia Chamberlain is a 56 y.o. female who present to the clinic today as a new patient. She is accompanied by her , Lio.      She was referred by pulmonology for further evaluation of abnormal echocardiogram. She has centrilobular emphysema/COPD and pulmonology had ordered an echocardiogram. She would like to discuss her echocardiogram today. Pulmonology has placed her on nighttime oxygen, pt reports compliance.     She complains of chest pain for years. She describes the discomfort as a sharp stabbing pain. Nothing generally alleviates the pain, it resolves with time. She reports tachycardia with activity and rest. She reports with sitting watching TV her heart rate elevates to 130 bpm. She states she feels bad with these spells but denies syncope. She does consume diet dr. Cunningham. She does take daily ASA. Family history significant for a sibling with heart disease and father with underlying HTN. She has previously seen Dr. Sanchez and had a nuclear stress test which was negative per patient report however no results are available for review. Denies HTN or Dm.     She has hyperlipidemia and takes lipitor 20 mg daily. She reports compliance and denies myalgias.      She complains of bilateral LE swelling. She doesn't take diuretics for symptoms but wondering if there is something that could help with her swelling. No recent labs evaluating kidney function. She denies any hx of CKD.     The following portions of the patient's history were reviewed and updated as appropriate: allergies, current medications, past family history, past medical history, past social history, past surgical history and  problem list.    Current Outpatient Medications:   •  acetaminophen (TYLENOL) 500 MG tablet, Take 500 mg by mouth Every 6 (Six) Hours As Needed for Mild Pain ., Disp: , Rfl:   •  albuterol sulfate  (90 Base) MCG/ACT inhaler, Inhale 2 puffs Every 4 (Four) Hours As Needed for Wheezing., Disp: 18 g, Rfl: 5  •  Allergy Relief 10 MG tablet, , Disp: , Rfl:   •  ALPRAZolam (XANAX) 2 MG tablet, Take 2 mg by mouth., Disp: , Rfl:   •  amitriptyline (ELAVIL) 25 MG tablet, , Disp: , Rfl:   •  aspirin 81 MG EC tablet, Take 81 mg by mouth Daily., Disp: , Rfl:   •  ibuprofen (ADVIL,MOTRIN) 800 MG tablet, , Disp: , Rfl:   •  ipratropium-albuterol (DUO-NEB) 0.5-2.5 mg/3 ml nebulizer, Take 3 mL by nebulization 4 (Four) Times a Day As Needed for Wheezing or Shortness of Air., Disp: 120 mL, Rfl: 5  •  methocarbamol (ROBAXIN) 750 MG tablet, Take 750 mg by mouth 4 (Four) Times a Day As Needed for Muscle Spasms., Disp: , Rfl:   •  O2 (OXYGEN), Inhale 2 L/min Every Night., Disp: , Rfl:   •  omeprazole (priLOSEC) 40 MG capsule, Take 1 capsule by mouth Daily., Disp: 30 capsule, Rfl: 5  •  Stiolto Respimat 2.5-2.5 MCG/ACT aerosol solution inhaler, , Disp: , Rfl:   •  atorvastatin (LIPITOR) 20 MG tablet, Take 2 tablets by mouth Every Night., Disp: 90 tablet, Rfl: 0  •  hydroCHLOROthiazide (HYDRODIURIL) 25 MG tablet, Take 1 tablet by mouth Daily., Disp: 30 tablet, Rfl: 3    Review of Systems   Constitutional: Negative for activity change, appetite change, chills, diaphoresis, fatigue and fever.   HENT: Negative for congestion, drooling, ear discharge, ear pain, mouth sores, nosebleeds, postnasal drip, rhinorrhea, sinus pressure, sneezing and sore throat.    Eyes: Negative for pain, discharge and visual disturbance.   Respiratory: Positive for shortness of breath. Negative for cough, chest tightness and wheezing.    Cardiovascular: Positive for chest pain, palpitations and leg swelling.   Gastrointestinal: Negative for abdominal pain,  "constipation, diarrhea, nausea and vomiting.   Endocrine: Negative for cold intolerance, heat intolerance, polydipsia, polyphagia and polyuria.   Musculoskeletal: Negative for arthralgias, myalgias and neck pain.   Skin: Negative for rash and wound.   Neurological: Negative for syncope, speech difficulty, weakness, light-headedness and headaches.   Hematological: Negative for adenopathy. Does not bruise/bleed easily.   Psychiatric/Behavioral: Negative for confusion, dysphoric mood and sleep disturbance. The patient is not nervous/anxious.    All other systems reviewed and are negative.    Patient Active Problem List   Diagnosis   • Abnormal mammogram   • Breast mass   • Breast cyst   • Centrilobular emphysema (HCC)   • Encounter for screening for malignant neoplasm of colon     Past Medical History:   Diagnosis Date   • Anxiety 06/25/2018   • Arthritis    • COPD (chronic obstructive pulmonary disease) (HCC)    • Elevated cholesterol    • Fibromyalgia    • Migraines    • RSD (reflex sympathetic dystrophy)      History reviewed. No pertinent surgical history.    /88 (BP Location: Left arm, Patient Position: Sitting, Cuff Size: Adult)   Pulse 83   Temp 96.8 °F (36 °C)   Ht 165.1 cm (65\")   Wt 64.4 kg (142 lb)   LMP  (LMP Unknown)   SpO2 98%   BMI 23.63 kg/m²     Objective   Allergies   Allergen Reactions   • Floxin [Ofloxacin]          Physical Exam  Vitals reviewed.   Constitutional:       Appearance: Normal appearance. She is well-developed.   HENT:      Head: Normocephalic.   Eyes:      Conjunctiva/sclera: Conjunctivae normal.   Neck:      Thyroid: No thyromegaly.      Vascular: No carotid bruit or JVD.   Cardiovascular:      Rate and Rhythm: Normal rate and regular rhythm.   Pulmonary:      Effort: Pulmonary effort is normal.      Breath sounds: Normal breath sounds.   Abdominal:      Palpations: Abdomen is soft. There is no hepatomegaly or splenomegaly.      Tenderness: There is no abdominal " tenderness.   Musculoskeletal:      Cervical back: Normal range of motion and neck supple.      Right lower leg: Edema (trace) present.      Left lower leg: Edema (trace) present.   Skin:     General: Skin is warm and dry.   Neurological:      Mental Status: She is alert and oriented to person, place, and time.   Psychiatric:         Attention and Perception: Attention normal.         Mood and Affect: Mood normal.         Speech: Speech normal.         Behavior: Behavior normal. Behavior is cooperative.         Cognition and Memory: Cognition normal.          ECG 12 Lead    Date/Time: 8/15/2022 9:22 PM  Performed by: Isabelle Bunn APRN  Authorized by: Isabelle Bunn APRN   Comparison: compared with previous ECG   Similar to previous ECG  Comparison to previous EC/10/2021 & 10/29/2019  Rhythm: sinus rhythm  Rate: normal  BPM: 83  Conduction: non-specific intraventricular conduction delay  Other findings: non-specific ST-T wave changes and left atrial abnormality    Clinical impression: non-specific ECG  Comments: QT/QTc - 345/384          Basilia Chamberlain  Complete Transthoracic Echocardiogram with Complete Doppler and Color Flow  Order# [id number]  Reading physician: Pascual Elizalde MD Ordering physician: Zelda Guidry APRN Study date: 22       Patient Information    Patient Name   Basilia Chamberlain MRN   [id number] Legal Sex   Female  (Age)   1965 (56 y.o.)      [facility] PACS Images     Show images for Adult Transthoracic Echo Complete W/ Cont if Necessary Per Protocol     Sedation Narrator Report    Sedation Narrator Report     Interpretation Summary    · Left ventricular ejection fraction appears to be 66 - 70%. Left ventricular systolic function is normal.  · Left ventricular diastolic function is consistent with (grade I) impaired relaxation.  · There is a small (<1cm) pericardial effusion adjacent to the right ventricle and left ventricle.  · This is a technically  difficult study.             Assessment & Plan   Diagnoses and all orders for this visit:    1. Precordial pain (Primary)  -     Stress Test With Myocardial Perfusion (1 Day); Future  -     ECG 12 Lead  EKG, reviewed and discussed.  No acute changes.  Continue daily aspirin  Further work-up will be arranged    2. Tachycardia  -     Holter Monitor - 72 Hour Up To 15 Days; Future  -     ECG 12 Lead  Heart rate normal on EKG today.  Holter monitor, further recommendations pending results.    3. Diastolic dysfunction  -     BNP; Future  -     Comprehensive Metabolic Panel; Future  Labs with further recommendations pending results    4. Pericardial effusion  Reviewed and discussed echocardiogram.    We will need to monitor this with repeat echo    5. Hyperlipidemia   Review of labs from PCP with elevated cholesterol, recommend increasing her Lipitor from 20 to 40 mg and reevaluate labs (ck, cmp and flp) in 2 to 3 months.    6. Leg swelling  -     US venous doppler lower extremity bilateral (duplex); Future  Further testing will be arranged, encouraged in utilization of compression stockings and elevating legs.  Could consider a trial of diuretics pending her lab results.      Review of medical record  Request medical records from Dr. Sanchez's office of her previous cardiac testing.   Follow-up in 4 weeks to discuss and review testing, sooner if needed.

## 2022-08-04 ENCOUNTER — TELEPHONE (OUTPATIENT)
Dept: CARDIOLOGY | Facility: CLINIC | Age: 57
End: 2022-08-04

## 2022-08-04 RX ORDER — HYDROCHLOROTHIAZIDE 25 MG/1
25 TABLET ORAL DAILY
Qty: 30 TABLET | Refills: 3 | Status: SHIPPED | OUTPATIENT
Start: 2022-08-04 | End: 2022-09-06 | Stop reason: SDUPTHER

## 2022-08-04 RX ORDER — ATORVASTATIN CALCIUM 20 MG/1
40 TABLET, FILM COATED ORAL NIGHTLY
Qty: 90 TABLET | Refills: 0 | Status: SHIPPED | OUTPATIENT
Start: 2022-08-04 | End: 2022-09-06 | Stop reason: SDUPTHER

## 2022-08-04 NOTE — TELEPHONE ENCOUNTER
----- Message from ALANA Urbano sent at 8/4/2022  4:44 PM EDT -----  Labs received and reviewed from PCP   CMP - Kidney fx ok, electrolytes ok, liver enzymes normal   Cholesterol elevated - recommend increasing Lipitor from 20 to 40 mg, we can discuss further at follow up   BNP - normal, no acute heart failure. Start HCTZ 25 mg daily in the am. Monitor BP and keep follow up as scheduled.

## 2022-08-15 ENCOUNTER — TELEPHONE (OUTPATIENT)
Dept: CARDIOLOGY | Facility: CLINIC | Age: 57
End: 2022-08-15

## 2022-08-15 DIAGNOSIS — I51.89 DIASTOLIC DYSFUNCTION: Primary | ICD-10-CM

## 2022-08-15 PROCEDURE — 93000 ELECTROCARDIOGRAM COMPLETE: CPT | Performed by: NURSE PRACTITIONER

## 2022-08-15 NOTE — TELEPHONE ENCOUNTER
Patients  returned call. States that patients BP can go up and down at times and states that she has swelling of the OVIDIO LE. He states that patient took a 1/2 tablet of the HCTZ 25mg this morning. He also stated that patient has been having chest pain. I advised patients  to take patient to the ER especially if her symptoms worsened. He was concerned about her swelling. I advised him that if patient BP was ok she could take another 1/2 tablet of the HCTZ if she was comfortable with that. He wanted to know if there was a different water pill that the patient could take.

## 2022-08-15 NOTE — TELEPHONE ENCOUNTER
Patients  called says when she takes the HCTZ her blood pressure drops to 79/48 then last night it got back up to 159/113 wants to know what to do.

## 2022-08-16 NOTE — TELEPHONE ENCOUNTER
Isabelle Bunn, Guilherme Perkins MA  Caller: Unspecified (Yesterday,  4:16 PM)  1/2 of tab of HCTZ is fine as long as her BP is tolerating it. We have other diuretic options. Any diuretic can affect BP. I would recommend a repeat BMP to evaluate kidney function before further recommendations on diuretics. Ensure she is limiting sodium to < 2000 mg/day and fluid restriction as well. If she is having chest pains, recommend emergent evaluation.   Isabelle

## 2022-08-16 NOTE — TELEPHONE ENCOUNTER
Contacted patient. Patient verbalized understanding and states she will go have labs done in the morning. Patient also states that her abdomen is painful and bloated and was wondering in we could order labs to check her liver. I did advise patient that she would probably have to have those ordered by her pcp. She understood but would like to ask. Please advise.

## 2022-08-17 ENCOUNTER — LAB (OUTPATIENT)
Dept: LAB | Facility: HOSPITAL | Age: 57
End: 2022-08-17

## 2022-08-17 DIAGNOSIS — I51.89 DIASTOLIC DYSFUNCTION: ICD-10-CM

## 2022-08-17 PROCEDURE — 83880 ASSAY OF NATRIURETIC PEPTIDE: CPT

## 2022-08-17 PROCEDURE — 80053 COMPREHEN METABOLIC PANEL: CPT

## 2022-08-18 ENCOUNTER — TELEPHONE (OUTPATIENT)
Dept: CARDIOLOGY | Facility: CLINIC | Age: 57
End: 2022-08-18

## 2022-08-18 ENCOUNTER — TREATMENT (OUTPATIENT)
Dept: CARDIOLOGY | Facility: CLINIC | Age: 57
End: 2022-08-18

## 2022-08-18 DIAGNOSIS — R00.0 TACHYCARDIA: ICD-10-CM

## 2022-08-18 LAB
ALBUMIN SERPL-MCNC: 4.6 G/DL (ref 3.5–5.2)
ALBUMIN/GLOB SERPL: 1.9 G/DL
ALP SERPL-CCNC: 98 U/L (ref 39–117)
ALT SERPL W P-5'-P-CCNC: 14 U/L (ref 1–33)
ANION GAP SERPL CALCULATED.3IONS-SCNC: 14.7 MMOL/L (ref 5–15)
AST SERPL-CCNC: 18 U/L (ref 1–32)
BILIRUB SERPL-MCNC: 0.4 MG/DL (ref 0–1.2)
BUN SERPL-MCNC: 8 MG/DL (ref 6–20)
BUN/CREAT SERPL: 13.1 (ref 7–25)
CALCIUM SPEC-SCNC: 9.3 MG/DL (ref 8.6–10.5)
CHLORIDE SERPL-SCNC: 99 MMOL/L (ref 98–107)
CO2 SERPL-SCNC: 23.3 MMOL/L (ref 22–29)
CREAT SERPL-MCNC: 0.61 MG/DL (ref 0.57–1)
EGFRCR SERPLBLD CKD-EPI 2021: 106.4 ML/MIN/1.73
GLOBULIN UR ELPH-MCNC: 2.4 GM/DL
GLUCOSE SERPL-MCNC: 77 MG/DL (ref 65–99)
NT-PROBNP SERPL-MCNC: 30.6 PG/ML (ref 0–900)
POTASSIUM SERPL-SCNC: 3.7 MMOL/L (ref 3.5–5.2)
PROT SERPL-MCNC: 7 G/DL (ref 6–8.5)
SODIUM SERPL-SCNC: 137 MMOL/L (ref 136–145)

## 2022-08-18 PROCEDURE — 93246 EXT ECG>7D<15D RECORDING: CPT | Performed by: INTERNAL MEDICINE

## 2022-08-18 RX ORDER — ASPIRIN 81 MG/1
81 TABLET ORAL DAILY
Qty: 90 TABLET | Refills: 0 | Status: SHIPPED | OUTPATIENT
Start: 2022-08-18 | End: 2023-01-18 | Stop reason: SDUPTHER

## 2022-08-18 RX ORDER — METOPROLOL SUCCINATE 25 MG/1
12.5 TABLET, EXTENDED RELEASE ORAL DAILY
Qty: 45 TABLET | Refills: 0 | Status: SHIPPED | OUTPATIENT
Start: 2022-08-18 | End: 2022-09-06 | Stop reason: SDUPTHER

## 2022-08-18 NOTE — TELEPHONE ENCOUNTER
----- Message from ALANA Urbano sent at 8/18/2022 12:49 PM EDT -----  Labs   1. CMP is normal - kidney function is normal and electrolytes are stable.   2. BNP - no evidence of heart failure  Heart monitor showed episodes of atrial tachycardia. Recommend starting Toprol XL 12.5 mg daily. Recommend monitoring BP and HR. Keep follow up as scheduled.

## 2022-09-01 ENCOUNTER — OFFICE VISIT (OUTPATIENT)
Dept: PULMONOLOGY | Facility: CLINIC | Age: 57
End: 2022-09-01

## 2022-09-01 VITALS — WEIGHT: 142 LBS | BODY MASS INDEX: 23.66 KG/M2 | HEIGHT: 65 IN

## 2022-09-01 DIAGNOSIS — F17.210 CIGARETTE NICOTINE DEPENDENCE WITHOUT COMPLICATION: ICD-10-CM

## 2022-09-01 DIAGNOSIS — J43.2 CENTRILOBULAR EMPHYSEMA: Primary | ICD-10-CM

## 2022-09-01 DIAGNOSIS — G47.34 NOCTURNAL HYPOXIA: ICD-10-CM

## 2022-09-01 PROCEDURE — 99443 PR PHYS/QHP TELEPHONE EVALUATION 21-30 MIN: CPT | Performed by: NURSE PRACTITIONER

## 2022-09-01 RX ORDER — ALBUTEROL SULFATE 90 UG/1
2 AEROSOL, METERED RESPIRATORY (INHALATION) EVERY 4 HOURS PRN
Qty: 18 G | Refills: 5 | Status: SHIPPED | OUTPATIENT
Start: 2022-09-01

## 2022-09-01 RX ORDER — TIOTROPIUM BROMIDE AND OLODATEROL 3.124; 2.736 UG/1; UG/1
2 SPRAY, METERED RESPIRATORY (INHALATION) DAILY
Qty: 4 G | Refills: 5 | Status: SHIPPED | OUTPATIENT
Start: 2022-09-01

## 2022-09-01 RX ORDER — IPRATROPIUM BROMIDE AND ALBUTEROL SULFATE 2.5; .5 MG/3ML; MG/3ML
3 SOLUTION RESPIRATORY (INHALATION) 4 TIMES DAILY PRN
Qty: 120 ML | Refills: 5 | Status: SHIPPED | OUTPATIENT
Start: 2022-09-01

## 2022-09-01 RX ORDER — ATORVASTATIN CALCIUM 40 MG/1
TABLET, FILM COATED ORAL
COMMUNITY
Start: 2022-08-30 | End: 2022-09-06 | Stop reason: SDUPTHER

## 2022-09-01 RX ORDER — FLUTICASONE PROPIONATE 220 UG/1
2 AEROSOL, METERED RESPIRATORY (INHALATION)
Qty: 12 G | Refills: 5 | Status: SHIPPED | OUTPATIENT
Start: 2022-09-01

## 2022-09-01 NOTE — PROGRESS NOTES
"You have chosen to receive care through a telephone visit. Do you consent to use a telephone visit for your medical care today? Yes      Chief Complaint  Emphysema    Subjective        Basilia Coco Chamberlain presents to [facility] PULMONARY & CRITICAL CARE MEDICINE  History of Present Illness     Ms. Chamberlain is a 56 year old female with a medical history significant for anxiety, arthritis, COPD, hyperlipidemia, migraines and fibromyalgia.    She doing a telephone visit today for follow up on COPD.  She states that she still continues to have shortness of breath.  She complains of frequent \"smothering.\"  She is currently taking albuterol as needed and duonebs as needed.  She recently underwent echo and PFT.  She is unsure of what inhalers she is taking.  She reports that she is using supplemental oxygen at night.    Objective   Vital Signs:  Ht 165.1 cm (65\")   Wt 64.4 kg (142 lb)   BMI 23.63 kg/m²   Estimated body mass index is 23.63 kg/m² as calculated from the following:    Height as of this encounter: 165.1 cm (65\").    Weight as of this encounter: 64.4 kg (142 lb).    BMI is within normal parameters. No other follow-up for BMI required.      Physical Exam     GENERAL APPEARANCE: Well developed, well nourished, alert and cooperative, and appears to be in no acute distress.    HEAD: normocephalic. Atraumatic.    EYES: PERRL, EOMI. Vision is grossly intact.    THROAT: Oral cavity and pharynx normal. No inflammation, swelling, exudate, or lesions.     NECK: Neck supple.  No thyromegaly.    CARDIAC: Normal S1 and S2. No S3, S4 or murmurs. Rhythm is regular.     RESPIRATORY:Bilateral air entry positive. Bilateral diminished breath sounds. No wheezing, crackles or rhonchi noted.    GI: Positive bowel sounds. Soft, nondistended, nontender.     MUSCULOSKELETAL: No significant deformity or joint abnormality. No edema. Peripheral pulses intact. No varicosities.    NEUROLOGICAL: Strength and " sensation symmetric and intact throughout.     PSYCHIATRIC: The mental examination revealed the patient was oriented to person, place, and time.     Result Review :  The following data was reviewed by: ALANA Nicholson on 09/01/2022:  Common labs    Common Labsle 12/10/21 12/10/21 8/17/22    1213 1333    Glucose  102 (A) 77   BUN  5 (A) 8   Creatinine  0.58 0.61   eGFR Non African Am  108    Sodium  136 137   Potassium  3.8 3.7   Chloride  105 99   Calcium  8.4 (A) 9.3   Albumin  3.97 4.60   Total Bilirubin  0.3 0.4   Alkaline Phosphatase  99 98   AST (SGOT)  18 18   ALT (SGPT)  21 14   WBC 8.16     Hemoglobin 13.7     Hematocrit 40.2     Platelets 307     (A) Abnormal value       Comments are available for some flowsheets but are not being displayed.                    Assessment and Plan   Diagnoses and all orders for this visit:    1. Centrilobular emphysema (HCC) (Primary)    2. Nocturnal hypoxia    3. Cigarette nicotine dependence without complication    Other orders  -     fluticasone (FLOVENT HFA) 220 MCG/ACT inhaler; Inhale 2 puffs 2 (Two) Times a Day.  Dispense: 12 g; Refill: 5  -     Stiolto Respimat 2.5-2.5 MCG/ACT aerosol solution inhaler; Inhale 2 puffs Daily.  Dispense: 4 g; Refill: 5  -     ipratropium-albuterol (DUO-NEB) 0.5-2.5 mg/3 ml nebulizer; Take 3 mL by nebulization 4 (Four) Times a Day As Needed for Wheezing or Shortness of Air.  Dispense: 120 mL; Refill: 5  -     albuterol sulfate  (90 Base) MCG/ACT inhaler; Inhale 2 puffs Every 4 (Four) Hours As Needed for Wheezing.  Dispense: 18 g; Refill: 5         She is currently on Stiolto once daily.  We will start her on Flovent BID.  Continue albuterol and duonebs as needed.  Sent refills to pharmacy.    PFT was reviewed and discussed in detail.    Also reviewed echo with her.  She was referred to cardiology for further evaluation.    She is complaint with use of supplemental oxygen at night.    She remains a smoker of about 1/2  ppd.    Basilia Coco MarshallBulmaro  reports that she has been smoking cigarettes. She has been smoking about 0.50 packs per day. She has never used smokeless tobacco.. I have educated her on the risk of diseases from using tobacco products such as cancer, COPD and heart disease.     I advised her to quit and she is not willing to quit.       LDCT was completed.  RADS category 1.  We will repeat in one year per screening guidelines.       Follow Up   Return in about 3 months (around 12/1/2022).  Patient was given instructions and counseling regarding her condition or for health maintenance advice. Please see specific information pulled into the AVS if appropriate.     This visit has been rescheduled as a phone visit to comply with patient safety concerns in accordance with CDC recommendations. Total time of discussion was 30 minutes.

## 2022-09-02 ENCOUNTER — HOSPITAL ENCOUNTER (OUTPATIENT)
Dept: CARDIOLOGY | Facility: HOSPITAL | Age: 57
Discharge: HOME | End: 2022-09-02

## 2022-09-02 ENCOUNTER — TELEPHONE (OUTPATIENT)
Dept: CARDIOLOGY | Facility: CLINIC | Age: 57
End: 2022-09-02

## 2022-09-02 DIAGNOSIS — I31.39 PERICARDIAL EFFUSION: ICD-10-CM

## 2022-09-02 DIAGNOSIS — M79.89 LEG SWELLING: ICD-10-CM

## 2022-09-02 LAB
MAXIMAL PREDICTED HEART RATE: 166 BPM
STRESS TARGET HR: 141 BPM

## 2022-09-02 PROCEDURE — 93970 EXTREMITY STUDY: CPT | Performed by: RADIOLOGY

## 2022-09-02 PROCEDURE — 93970 EXTREMITY STUDY: CPT

## 2022-09-02 PROCEDURE — 93308 TTE F-UP OR LMTD: CPT | Performed by: INTERNAL MEDICINE

## 2022-09-02 PROCEDURE — 93308 TTE F-UP OR LMTD: CPT

## 2022-09-02 NOTE — TELEPHONE ENCOUNTER
----- Message from ALANA Urbano sent at 9/2/2022 12:52 PM EDT -----  1. Venous doppler is negative for DVT  2. Limited echocardiogram show improvement in pericardial effusion  3. Stress lab states she was feeling bad and didn't have stress test done today. She was complaining of chest pain, I agree with their recommendation of going to the ER for further evaluation. Once she is feeling better she should reschedule testing and keep scheduled follow up in clinic.

## 2022-09-02 NOTE — TELEPHONE ENCOUNTER
Stress lab called stating pt wasn't feeling well after echocardiogram and requested to reschedule her test. She advised staff she was experiencing chest discomfort, they recommended she go to ER but then didn't want to wait for evaluation. Staff advised against leaving until further evaluation however pt reports they will go to [facility]. Again that wasn't advisable.

## 2022-09-06 ENCOUNTER — OFFICE VISIT (OUTPATIENT)
Dept: CARDIOLOGY | Facility: CLINIC | Age: 57
End: 2022-09-06

## 2022-09-06 VITALS
HEART RATE: 88 BPM | TEMPERATURE: 96.6 F | SYSTOLIC BLOOD PRESSURE: 122 MMHG | OXYGEN SATURATION: 99 % | DIASTOLIC BLOOD PRESSURE: 80 MMHG | BODY MASS INDEX: 23.82 KG/M2 | HEIGHT: 65 IN | WEIGHT: 143 LBS

## 2022-09-06 DIAGNOSIS — E78.5 HYPERLIPIDEMIA, UNSPECIFIED HYPERLIPIDEMIA TYPE: ICD-10-CM

## 2022-09-06 DIAGNOSIS — I47.1 PAROXYSMAL SVT (SUPRAVENTRICULAR TACHYCARDIA): ICD-10-CM

## 2022-09-06 DIAGNOSIS — R07.2 PRECORDIAL PAIN: Primary | ICD-10-CM

## 2022-09-06 DIAGNOSIS — I31.39 PERICARDIAL EFFUSION: ICD-10-CM

## 2022-09-06 DIAGNOSIS — M79.89 LEG SWELLING: ICD-10-CM

## 2022-09-06 PROCEDURE — 99214 OFFICE O/P EST MOD 30 MIN: CPT | Performed by: NURSE PRACTITIONER

## 2022-09-06 RX ORDER — ERGOCALCIFEROL 1.25 MG/1
1 CAPSULE ORAL WEEKLY
COMMUNITY
Start: 2022-08-30

## 2022-09-06 RX ORDER — METOPROLOL SUCCINATE 25 MG/1
12.5 TABLET, EXTENDED RELEASE ORAL DAILY
Qty: 30 TABLET | Refills: 1 | Status: SHIPPED | OUTPATIENT
Start: 2022-09-06 | End: 2023-03-06 | Stop reason: SDUPTHER

## 2022-09-06 RX ORDER — ATORVASTATIN CALCIUM 40 MG/1
40 TABLET, FILM COATED ORAL DAILY
Qty: 90 TABLET | Refills: 0 | Status: SHIPPED | OUTPATIENT
Start: 2022-09-06 | End: 2022-11-08 | Stop reason: SDUPTHER

## 2022-09-06 RX ORDER — HYDROCHLOROTHIAZIDE 25 MG/1
12.5 TABLET ORAL DAILY
Qty: 30 TABLET | Refills: 3 | Status: SHIPPED | OUTPATIENT
Start: 2022-09-06

## 2022-09-06 NOTE — PROGRESS NOTES
Subjective   Basilia Chamberlain is a 56 y.o. female.   Chief Complaint   Patient presents with   • Results     Heart Echo, Ultrasound and holter monitor       History of Present Illness   Basilia Chamberlain is a 56-year-old female who presents to clinic today for routine follow-up.  She is accompanied by her .    She continues to have intermittent chest pain but reports it is much improved.  She feels it does not occur as often and not as intense and episodes are shorter than they were.  She is awaiting to have her stress test rescheduled because she did not feel well the patient her test was scheduled.  She is having chest discomfort and it was recommended that she go to the ER however she was reluctant to proceed.  She continues on daily aspirin.      Tachycardia recently completed a Holter monitor and wants to discuss those results today.  She was recently started on Toprol-XL 12.5 mg daily, reports tolerating well.  Denies bradycardia, dizziness or syncope. Feels her tachycardia/palpitations have improved since starting medication.      Pericardial effusion recently noted on echocardiogram, recently had a repeat study and wants to discuss those results today.  Previous echo had diastolic dysfunction noted however BNP was normal.  She does complain of lower extremity swelling had a recent ultrasound and would like to discuss those results.  It was recommended to try HCTZ, she reports it is helping with the swelling however she is only taking 12.5 due to the effects on her blood pressure.    Hyperlipidemia tolerating the recent increase in medication well.  She is due for routine labs in the near future.  She denies myalgias.      The following portions of the patient's history were reviewed and updated as appropriate: allergies, current medications, past family history, past medical history, past social history, past surgical history and problem list.    Current Outpatient Medications:   •   acetaminophen (TYLENOL) 500 MG tablet, Take 500 mg by mouth Every 6 (Six) Hours As Needed for Mild Pain ., Disp: , Rfl:   •  albuterol sulfate  (90 Base) MCG/ACT inhaler, Inhale 2 puffs Every 4 (Four) Hours As Needed for Wheezing., Disp: 18 g, Rfl: 5  •  Allergy Relief 10 MG tablet, , Disp: , Rfl:   •  ALPRAZolam (XANAX) 2 MG tablet, Take 2 mg by mouth., Disp: , Rfl:   •  amitriptyline (ELAVIL) 25 MG tablet, , Disp: , Rfl:   •  aspirin (aspirin) 81 MG EC tablet, Take 1 tablet by mouth Daily., Disp: 90 tablet, Rfl: 0  •  atorvastatin (LIPITOR) 40 MG tablet, Take 1 tablet by mouth Daily., Disp: 90 tablet, Rfl: 0  •  fluticasone (FLOVENT HFA) 220 MCG/ACT inhaler, Inhale 2 puffs 2 (Two) Times a Day., Disp: 12 g, Rfl: 5  •  hydroCHLOROthiazide (HYDRODIURIL) 25 MG tablet, Take 0.5 tablets by mouth Daily., Disp: 30 tablet, Rfl: 3  •  ibuprofen (ADVIL,MOTRIN) 800 MG tablet, , Disp: , Rfl:   •  ipratropium-albuterol (DUO-NEB) 0.5-2.5 mg/3 ml nebulizer, Take 3 mL by nebulization 4 (Four) Times a Day As Needed for Wheezing or Shortness of Air., Disp: 120 mL, Rfl: 5  •  methocarbamol (ROBAXIN) 750 MG tablet, Take 750 mg by mouth 4 (Four) Times a Day As Needed for Muscle Spasms., Disp: , Rfl:   •  metoprolol succinate XL (TOPROL-XL) 25 MG 24 hr tablet, Take 0.5 tablets by mouth Daily., Disp: 30 tablet, Rfl: 1  •  O2 (OXYGEN), Inhale 2 L/min Every Night., Disp: , Rfl:   •  omeprazole (priLOSEC) 40 MG capsule, Take 1 capsule by mouth Daily., Disp: 30 capsule, Rfl: 5  •  Stiolto Respimat 2.5-2.5 MCG/ACT aerosol solution inhaler, Inhale 2 puffs Daily., Disp: 4 g, Rfl: 5  •  vitamin D (ERGOCALCIFEROL) 1.25 MG (92824 UT) capsule capsule, Take 1 capsule by mouth 1 (One) Time Per Week., Disp: , Rfl:     Review of Systems   Constitutional: Negative for activity change, appetite change, chills, diaphoresis, fatigue and fever.   HENT: Negative for congestion, drooling, ear discharge, ear pain, mouth sores, nosebleeds, postnasal  "drip, rhinorrhea, sinus pressure, sneezing and sore throat.    Eyes: Negative for pain, discharge and visual disturbance.   Respiratory: Negative for cough, chest tightness, shortness of breath and wheezing.    Cardiovascular: Negative for chest pain, palpitations and leg swelling.   Gastrointestinal: Negative for abdominal pain, constipation, diarrhea, nausea and vomiting.   Endocrine: Negative for cold intolerance, heat intolerance, polydipsia, polyphagia and polyuria.   Musculoskeletal: Negative for arthralgias, myalgias and neck pain.   Skin: Negative for rash and wound.   Neurological: Negative for dizziness, syncope, speech difficulty, weakness, light-headedness and headaches.   Hematological: Negative for adenopathy. Does not bruise/bleed easily.   Psychiatric/Behavioral: Negative for confusion, dysphoric mood and sleep disturbance. The patient is not nervous/anxious.    All other systems reviewed and are negative.    Patient Active Problem List   Diagnosis   • Abnormal mammogram   • Breast mass   • Breast cyst   • Centrilobular emphysema (HCC)   • Encounter for screening for malignant neoplasm of colon     Past Medical History:   Diagnosis Date   • Anxiety 06/25/2018   • Arthritis    • COPD (chronic obstructive pulmonary disease) (HCC)    • Elevated cholesterol    • Fibromyalgia    • Migraines    • RSD (reflex sympathetic dystrophy)      History reviewed. No pertinent surgical history.    /80 (BP Location: Left arm, Patient Position: Sitting, Cuff Size: Adult)   Pulse 88   Temp 96.6 °F (35.9 °C)   Ht 165.1 cm (65\")   Wt 64.9 kg (143 lb)   LMP  (LMP Unknown)   SpO2 99%   BMI 23.80 kg/m²     Objective   Allergies   Allergen Reactions   • Floxin [Ofloxacin]        Physical Exam  Vitals reviewed.   Constitutional:       Appearance: Normal appearance. She is well-developed.   HENT:      Head: Normocephalic.   Eyes:      Conjunctiva/sclera: Conjunctivae normal.   Neck:      Thyroid: No thyromegaly.    "   Vascular: No carotid bruit or JVD.   Cardiovascular:      Rate and Rhythm: Normal rate and regular rhythm.   Pulmonary:      Effort: Pulmonary effort is normal.      Breath sounds: Normal breath sounds.   Musculoskeletal:      Right lower leg: No edema.      Left lower leg: No edema.   Skin:     General: Skin is warm.   Neurological:      Mental Status: She is alert.   Psychiatric:         Attention and Perception: Attention normal.         Mood and Affect: Mood normal.         Speech: Speech normal.         Behavior: Behavior normal. Behavior is cooperative.         Cognition and Memory: Cognition normal.     Basilia Chamberlain  Limited Transthoracic Echocardiogram  Order# [id number]  Reading physician: Chandana Alex MD Ordering physician: Isabelle Bunn APRN Study date: 22       Patient Information    Patient Name   Basilia Chamberlain MRN   [id number] Legal Sex   Female  (Age)   1965 (56 y.o.)          [facility] PACS Images     Show images for Adult Transthoracic Echo Limited W/ Cont if Necessary Per Protocol     Sedation Narrator Report    Sedation Narrator Report       Interpretation Summary    · This is a limited study for follow-up of pericardial effusion.  · There is no Doppler available  · Normal left ventricular cavity size with normal wall motion.  · Left ventricular ejection fraction appears to be 61 - 65%. Left ventricular systolic function is normal.  · Trace anterior echo-free space is noted which may represent trivial pericardial effusion adjacent to the right ventricle. it is significantly improved.       US Venous Doppler Lower Extremity Bilateral (duplex) [[id number]] (Order [id number])  Order  Status: Final result     Appointment Information      PACS Images     Radiology Images    Study Result    Narrative & Impression   US VENOUS DOPPLER LOWER EXTREMITY BILATERAL (DUPLEX)-     CLINICAL INDICATION: leg swelling; M79.89-Other specified soft tissue  disorders         COMPARISON: None available      TECHNIQUE: Color Doppler imaging was used with compression and  augmentation to evaluate the lower extremity deep venous system.     FINDINGS:   There is patent spontaneous flow from the common femoral vein through  the posterior tibial veins.  There was no internal clot or area of noncompressibility.  Normal augmentation was elicited where applicable.     IMPRESSION:  No DVT in the lower extremities on today's exam.      This report was finalized on 9/2/2022 12:31 PM by Dr. Daniel Juárez MD.               Assessment & Plan   Diagnoses and all orders for this visit:    1. Precordial pain (Primary)  Symptoms improved, will continue to monitor and plan to proceed with nuclear stress test.     2. Hyperlipidemia, unspecified hyperlipidemia type  -     Comprehensive Metabolic Panel; Future  -     Lipid Panel; Future  -     CK; Future  Continue Lipitor 40 mg daily, recheck CMP, FLP and CK prior to next visit.  Healthy diet recommended    3. Paroxysmal SVT (supraventricular tachycardia) (HCC)  Discussed and reviewed Holter monitor, improved on Toprol-XL 4.5 mg daily, will plan to continue    4. Pericardial effusion  Discussed and reviewed echocardiogram.  Effusion significantly improved.      5. Leg swelling  Discussed and reviewed ultrasound.  Improved on HCTZ 12.5 mg daily, will plan to continue.    Other orders  -     hydroCHLOROthiazide (HYDRODIURIL) 25 MG tablet; Take 0.5 tablets by mouth Daily.  Dispense: 30 tablet; Refill: 3  -     metoprolol succinate XL (TOPROL-XL) 25 MG 24 hr tablet; Take 0.5 tablets by mouth Daily.  Dispense: 30 tablet; Refill: 1  -     atorvastatin (LIPITOR) 40 MG tablet; Take 1 tablet by mouth Daily.  Dispense: 90 tablet; Refill: 0    Further recommendations pending stress test  Follow-up in 2 months with labs prior to appointment, sooner if needed.         Patient mentions episodes that she is having periodically, she will further discuss with pulmonology and  primary and if new or worsening symptoms or persistent symptoms consider further cardiovascular testing.

## 2022-09-29 ENCOUNTER — LAB (OUTPATIENT)
Dept: LAB | Facility: HOSPITAL | Age: 57
End: 2022-09-29

## 2022-09-29 DIAGNOSIS — E78.5 HYPERLIPIDEMIA, UNSPECIFIED HYPERLIPIDEMIA TYPE: ICD-10-CM

## 2022-09-29 LAB
ALBUMIN SERPL-MCNC: 4.3 G/DL (ref 3.5–5.2)
ALBUMIN/GLOB SERPL: 1.6 G/DL
ALP SERPL-CCNC: 100 U/L (ref 39–117)
ALT SERPL W P-5'-P-CCNC: 30 U/L (ref 1–33)
ANION GAP SERPL CALCULATED.3IONS-SCNC: 11 MMOL/L (ref 5–15)
AST SERPL-CCNC: 24 U/L (ref 1–32)
BILIRUB SERPL-MCNC: 0.4 MG/DL (ref 0–1.2)
BUN SERPL-MCNC: 6 MG/DL (ref 6–20)
BUN/CREAT SERPL: 10.5 (ref 7–25)
CALCIUM SPEC-SCNC: 8.9 MG/DL (ref 8.6–10.5)
CHLORIDE SERPL-SCNC: 102 MMOL/L (ref 98–107)
CHOLEST SERPL-MCNC: 178 MG/DL (ref 0–200)
CK SERPL-CCNC: 37 U/L (ref 20–180)
CO2 SERPL-SCNC: 25 MMOL/L (ref 22–29)
CREAT SERPL-MCNC: 0.57 MG/DL (ref 0.57–1)
EGFRCR SERPLBLD CKD-EPI 2021: 108.1 ML/MIN/1.73
GLOBULIN UR ELPH-MCNC: 2.7 GM/DL
GLUCOSE SERPL-MCNC: 87 MG/DL (ref 65–99)
HDLC SERPL-MCNC: 81 MG/DL (ref 40–60)
LDLC SERPL CALC-MCNC: 84 MG/DL (ref 0–100)
LDLC/HDLC SERPL: 1.03 {RATIO}
POTASSIUM SERPL-SCNC: 4 MMOL/L (ref 3.5–5.2)
PROT SERPL-MCNC: 7 G/DL (ref 6–8.5)
SODIUM SERPL-SCNC: 138 MMOL/L (ref 136–145)
TRIGL SERPL-MCNC: 69 MG/DL (ref 0–150)
VLDLC SERPL-MCNC: 13 MG/DL (ref 5–40)

## 2022-09-29 PROCEDURE — 80061 LIPID PANEL: CPT

## 2022-09-29 PROCEDURE — 82550 ASSAY OF CK (CPK): CPT

## 2022-09-29 PROCEDURE — 80053 COMPREHEN METABOLIC PANEL: CPT

## 2022-10-05 DIAGNOSIS — Z12.11 ENCOUNTER FOR SCREENING FOR MALIGNANT NEOPLASM OF COLON: Primary | ICD-10-CM

## 2022-10-05 RX ORDER — POLYETHYLENE GLYCOL 3350 17 G/17G
510 POWDER, FOR SOLUTION ORAL ONCE
Qty: 510 G | Refills: 0 | Status: SHIPPED | OUTPATIENT
Start: 2022-10-05 | End: 2022-10-05

## 2022-10-05 RX ORDER — BISACODYL 5 MG/1
20 TABLET, DELAYED RELEASE ORAL ONCE
Qty: 4 TABLET | Refills: 0 | Status: SHIPPED | OUTPATIENT
Start: 2022-10-05 | End: 2022-10-05

## 2022-11-07 ENCOUNTER — OFFICE VISIT (OUTPATIENT)
Dept: CARDIOLOGY | Facility: CLINIC | Age: 57
End: 2022-11-07

## 2022-11-07 VITALS
BODY MASS INDEX: 24.32 KG/M2 | HEIGHT: 65 IN | OXYGEN SATURATION: 97 % | HEART RATE: 90 BPM | SYSTOLIC BLOOD PRESSURE: 142 MMHG | DIASTOLIC BLOOD PRESSURE: 84 MMHG | WEIGHT: 146 LBS

## 2022-11-07 DIAGNOSIS — I31.39 PERICARDIAL EFFUSION: ICD-10-CM

## 2022-11-07 DIAGNOSIS — R60.0 PEDAL EDEMA: ICD-10-CM

## 2022-11-07 DIAGNOSIS — E78.5 HYPERLIPIDEMIA, UNSPECIFIED HYPERLIPIDEMIA TYPE: Primary | ICD-10-CM

## 2022-11-07 DIAGNOSIS — I47.1 PAROXYSMAL SVT (SUPRAVENTRICULAR TACHYCARDIA): ICD-10-CM

## 2022-11-07 DIAGNOSIS — R07.2 PRECORDIAL PAIN: ICD-10-CM

## 2022-11-07 PROCEDURE — 99214 OFFICE O/P EST MOD 30 MIN: CPT | Performed by: NURSE PRACTITIONER

## 2022-11-07 NOTE — PROGRESS NOTES
Subjective   Basilia Chamberlain is a 56 y.o. female.   Chief Complaint   Patient presents with   • Hypertension     Follow up       History of Present Illness   Basilia Chamberlain is a 56-year-old female who presents to the clinic today for follow-up.  She is accompanied by her , Lio.     She continues to experience intermittent chest discomfort.  She is awaiting a stress test which is scheduled for tomorrow.  She continues on aspirin daily.  She reports symptoms have improved and she did not experience those as frequently and they are not as intense.    Hyperlipidemia, recently increased Lipitor from 20 to 40 mg daily.  She feels she is having increase in myalgias since increasing her dosage.  She reports otherwise compliance and had recent labs which she would like to discuss today.    Paroxysmal SVT recently started on Toprol XL 12.5 mg daily.  She feels this has helped symptoms and continues to have intermittent symptoms. She reports overall symptoms are improved and do not occur as frequently.    Pericardial effusion significantly improved on most recent echo in September 2022. Leg swelling stable and improved on HCTZ 12.5 mg daily.     The following portions of the patient's history were reviewed and updated as appropriate: allergies, current medications, past family history, past medical history, past social history, past surgical history and problem list.    Current Outpatient Medications:   •  acetaminophen (TYLENOL) 500 MG tablet, Take 500 mg by mouth Every 6 (Six) Hours As Needed for Mild Pain ., Disp: , Rfl:   •  albuterol sulfate  (90 Base) MCG/ACT inhaler, Inhale 2 puffs Every 4 (Four) Hours As Needed for Wheezing., Disp: 18 g, Rfl: 5  •  Allergy Relief 10 MG tablet, , Disp: , Rfl:   •  ALPRAZolam (XANAX) 2 MG tablet, Take 2 mg by mouth., Disp: , Rfl:   •  amitriptyline (ELAVIL) 25 MG tablet, , Disp: , Rfl:   •  aspirin (aspirin) 81 MG EC tablet, Take 1 tablet by mouth  Daily., Disp: 90 tablet, Rfl: 0  •  atorvastatin (LIPITOR) 40 MG tablet, Take 0.5 tablets by mouth Daily., Disp: 90 tablet, Rfl: 0  •  fluticasone (FLOVENT HFA) 220 MCG/ACT inhaler, Inhale 2 puffs 2 (Two) Times a Day., Disp: 12 g, Rfl: 5  •  hydroCHLOROthiazide (HYDRODIURIL) 25 MG tablet, Take 0.5 tablets by mouth Daily., Disp: 30 tablet, Rfl: 3  •  ibuprofen (ADVIL,MOTRIN) 800 MG tablet, , Disp: , Rfl:   •  ipratropium-albuterol (DUO-NEB) 0.5-2.5 mg/3 ml nebulizer, Take 3 mL by nebulization 4 (Four) Times a Day As Needed for Wheezing or Shortness of Air., Disp: 120 mL, Rfl: 5  •  methocarbamol (ROBAXIN) 750 MG tablet, Take 750 mg by mouth 4 (Four) Times a Day As Needed for Muscle Spasms., Disp: , Rfl:   •  metoprolol succinate XL (TOPROL-XL) 25 MG 24 hr tablet, Take 0.5 tablets by mouth Daily., Disp: 30 tablet, Rfl: 1  •  O2 (OXYGEN), Inhale 2 L/min Every Night., Disp: , Rfl:   •  Stiolto Respimat 2.5-2.5 MCG/ACT aerosol solution inhaler, Inhale 2 puffs Daily., Disp: 4 g, Rfl: 5  •  vitamin D (ERGOCALCIFEROL) 1.25 MG (10046 UT) capsule capsule, Take 1 capsule by mouth 1 (One) Time Per Week., Disp: , Rfl:     Review of Systems   Constitutional: Negative for activity change, appetite change, chills, diaphoresis, fatigue and fever.   HENT: Negative for congestion, drooling, ear discharge, ear pain, mouth sores, nosebleeds, postnasal drip, rhinorrhea, sinus pressure, sneezing and sore throat.    Eyes: Negative for pain, discharge and visual disturbance.   Respiratory: Negative for cough, chest tightness, shortness of breath and wheezing.    Cardiovascular: Positive for chest pain and palpitations. Negative for leg swelling.   Gastrointestinal: Negative for abdominal pain, constipation, diarrhea, nausea and vomiting.   Endocrine: Negative for cold intolerance, heat intolerance, polydipsia, polyphagia and polyuria.   Musculoskeletal: Positive for myalgias. Negative for arthralgias and neck pain.   Skin: Negative for rash  "and wound.   Neurological: Negative for dizziness, syncope, speech difficulty, weakness, light-headedness and headaches.   Hematological: Negative for adenopathy. Does not bruise/bleed easily.   Psychiatric/Behavioral: Negative for confusion, dysphoric mood and sleep disturbance. The patient is not nervous/anxious.    All other systems reviewed and are negative.    Patient Active Problem List   Diagnosis   • Abnormal mammogram   • Breast mass   • Breast cyst   • Centrilobular emphysema (HCC)   • Encounter for screening for malignant neoplasm of colon   • Hyperlipidemia   • Paroxysmal SVT (supraventricular tachycardia) (HCC)   • Pericardial effusion   • Pedal edema     Past Medical History:   Diagnosis Date   • Anxiety 06/25/2018   • Arthritis    • COPD (chronic obstructive pulmonary disease) (HCC)    • Elevated cholesterol    • Fibromyalgia    • Migraines    • RSD (reflex sympathetic dystrophy)      History reviewed. No pertinent surgical history.    /84 (BP Location: Left arm, Patient Position: Sitting, Cuff Size: Adult)   Pulse 90   Ht 165.1 cm (65\")   Wt 66.2 kg (146 lb)   LMP  (LMP Unknown)   SpO2 97%   BMI 24.30 kg/m²     Objective   Allergies   Allergen Reactions   • Floxin [Ofloxacin]      Physical Exam  Vitals reviewed.   Constitutional:       Appearance: Normal appearance. She is well-developed.   HENT:      Head: Normocephalic.   Eyes:      Conjunctiva/sclera: Conjunctivae normal.   Neck:      Vascular: No JVD.   Cardiovascular:      Rate and Rhythm: Normal rate and regular rhythm.   Pulmonary:      Effort: Pulmonary effort is normal.      Breath sounds: Normal breath sounds.   Musculoskeletal:      Cervical back: Neck supple.      Right lower leg: No edema.      Left lower leg: No edema.   Skin:     General: Skin is warm and dry.   Neurological:      Mental Status: She is alert and oriented to person, place, and time.   Psychiatric:         Attention and Perception: Attention normal.         " Mood and Affect: Mood normal.         Speech: Speech normal.         Behavior: Behavior normal. Behavior is cooperative.         Cognition and Memory: Cognition normal.       Lipid Panel  Order: [id number]   Status: Final result      Visible to patient: Yes (seen)      Next appt: 11/08/2022 at 07:15 AM in Radiology ([facility] [place] ADMIN 1)      Dx: Hyperlipidemia, unspecified hyperlipi...     Specimen Information: Blood    2 Result Notes     1  Topic  Component Ref Range & Units 1 mo ago    Total Cholesterol 0 - 200 mg/dL 178    Triglycerides 0 - 150 mg/dL 69    HDL Cholesterol 40 - 60 mg/dL 81 High     LDL Cholesterol  0 - 100 mg/dL 84    VLDL Cholesterol 5 - 40 mg/dL 13    LDL/HDL Ratio  1.03    Resulting Agency  [facility]            Assessment & Plan   Diagnoses and all orders for this visit:    1. Hyperlipidemia, unspecified hyperlipidemia type (Primary)  -     Comprehensive Metabolic Panel; Future  -     Lipid Panel; Future  -     CK; Future  Discussed and reviewed recent lipid panel with improvement in laboratory values since increasing Lipitor.  Patient reports intolerance to higher dose.  We will plan to decrease Lipitor to 20 mg and if she is able to tolerate 40 mg a few times a week.  We will continue to monitor cholesterol.  Heart healthy diet recommended.    2. Paroxysmal SVT (supraventricular tachycardia) (HCC)  Continue on Toprol XL 12.5 mg daily, she could try increasing to 12.5 mg twice daily due to worsening symptoms if blood pressure and heart rate can tolerate.  Recommend avoidance of caffeine    3. Precordial pain  Continue on daily aspirin, await stress testing results.  Recommendations pending testing results.    4. Pericardial effusion  Improved on most recent echo, continue to monitor    5. Pedal edema  Stable on HCTZ 12.5 mg daily, will plan to continue, recommended compression stockings, sodium restrictions.    Other orders  -     atorvastatin (LIPITOR) 40 MG tablet; Take 0.5 tablets by mouth  Daily.  Dispense: 90 tablet; Refill: 0      Follow-up in 6 months with labs prior to appointment, sooner if needed

## 2022-11-08 PROBLEM — E78.5 HYPERLIPIDEMIA: Status: ACTIVE | Noted: 2022-11-08

## 2022-11-08 PROBLEM — R60.0 PEDAL EDEMA: Status: ACTIVE | Noted: 2022-11-08

## 2022-11-08 PROBLEM — I31.39 PERICARDIAL EFFUSION: Status: ACTIVE | Noted: 2022-11-08

## 2022-11-08 PROBLEM — I47.10 PAROXYSMAL SVT (SUPRAVENTRICULAR TACHYCARDIA): Status: ACTIVE | Noted: 2022-11-08

## 2022-11-08 PROBLEM — I47.1 PAROXYSMAL SVT (SUPRAVENTRICULAR TACHYCARDIA): Status: ACTIVE | Noted: 2022-11-08

## 2022-11-08 RX ORDER — ATORVASTATIN CALCIUM 40 MG/1
20 TABLET, FILM COATED ORAL DAILY
Qty: 90 TABLET | Refills: 0
Start: 2022-11-08

## 2023-01-18 RX ORDER — ASPIRIN 81 MG/1
81 TABLET ORAL DAILY
Qty: 90 TABLET | Refills: 0 | Status: SHIPPED | OUTPATIENT
Start: 2023-01-18

## 2023-01-18 NOTE — TELEPHONE ENCOUNTER
DELETE AFTER REVIEWING: Send the encounter HIGH priority, If patient has less than a 3 day supply. If the patient will run out of medication over the weekend add that information to the additional details line. Send this encounter to the clinical pool.    Caller: Lio Chamberlain    Relationship: Emergency Contact    Best call back number: [phone]    Requested Prescriptions:   Requested Prescriptions     Pending Prescriptions Disp Refills   • aspirin 81 MG EC tablet 90 tablet 0     Sig: Take 1 tablet by mouth Daily.        Pharmacy where request should be sent: [facility] [address] [phone] [place] [phone] FX       Does the patient have less than a 3 day supply:  [] Yes  [x] No    Would you like a call back once the refill request has been completed: [x] Yes [] No    If the office needs to give you a call back, can they leave a voicemail: [x] Yes [] No    Irene Julian Rep   01/18/23 12:37 EST

## 2023-02-03 ENCOUNTER — TELEPHONE (OUTPATIENT)
Dept: PULMONOLOGY | Facility: CLINIC | Age: 58
End: 2023-02-03
Payer: COMMERCIAL

## 2023-02-03 ENCOUNTER — HOSPITAL ENCOUNTER (OUTPATIENT)
Dept: GENERAL RADIOLOGY | Facility: HOSPITAL | Age: 58
Discharge: HOME | End: 2023-02-03
Admitting: NURSE PRACTITIONER
Payer: COMMERCIAL

## 2023-02-03 DIAGNOSIS — J43.2 CENTRILOBULAR EMPHYSEMA: ICD-10-CM

## 2023-02-03 DIAGNOSIS — J43.2 CENTRILOBULAR EMPHYSEMA: Primary | ICD-10-CM

## 2023-02-03 PROCEDURE — 71046 X-RAY EXAM CHEST 2 VIEWS: CPT

## 2023-02-03 PROCEDURE — 71046 X-RAY EXAM CHEST 2 VIEWS: CPT | Performed by: RADIOLOGY

## 2023-02-03 NOTE — TELEPHONE ENCOUNTER
----- Message from ALANA Nicholson sent at 2/3/2023 11:28 AM EST -----  Will you let her know that her chest xray is normal and that her lung pain is probably just some pleurisy.  ----- Message -----  From: Soo, Rad Results [place]  Sent: 2/3/2023   9:43 AM EST  To: ALANA Nicholson

## 2023-02-08 ENCOUNTER — OFFICE VISIT (OUTPATIENT)
Dept: PULMONOLOGY | Facility: CLINIC | Age: 58
End: 2023-02-08
Payer: COMMERCIAL

## 2023-02-08 VITALS — HEIGHT: 65 IN | BODY MASS INDEX: 23.16 KG/M2 | WEIGHT: 139 LBS

## 2023-02-08 DIAGNOSIS — G47.34 NOCTURNAL HYPOXIA: ICD-10-CM

## 2023-02-08 DIAGNOSIS — R09.1 PLEURISY: ICD-10-CM

## 2023-02-08 DIAGNOSIS — F17.210 CIGARETTE NICOTINE DEPENDENCE WITHOUT COMPLICATION: ICD-10-CM

## 2023-02-08 DIAGNOSIS — J43.2 CENTRILOBULAR EMPHYSEMA: ICD-10-CM

## 2023-02-08 DIAGNOSIS — R06.02 SHORTNESS OF BREATH: Primary | ICD-10-CM

## 2023-02-08 PROCEDURE — 99443 PR PHYS/QHP TELEPHONE EVALUATION 21-30 MIN: CPT | Performed by: NURSE PRACTITIONER

## 2023-02-08 RX ORDER — PREDNISONE 10 MG/1
TABLET ORAL
Qty: 31 TABLET | Refills: 0 | Status: SHIPPED | OUTPATIENT
Start: 2023-02-08

## 2023-02-08 NOTE — PROGRESS NOTES
"    You have chosen to receive care through a telephone visit. Do you consent to use a telephone visit for your medical care today? Yes      Chief Complaint  Emphysema    Subjective        Basilia Chamberlain presents to [facility]UL[facility] MEDICINE  History of Present Illness     Ms. Garza is a 57 year old with a medical history significant for anxiety, arthritis, COPD, fibromyalgia, and migraines.     She is doing a telephone visit today for evaluation of lung pain. She states that she has been having this pain for several weeks.  She reports that it is constant and sharp and dull in nature.  She does report some increase in shortness of breath. She also reports a dry cough that is unchanged.  She denies any fevers.  She is complain with her current inhaler regimen. She does continue to smoke about 1/2 ppd.      Objective   Vital Signs:  Ht 165.1 cm (65\")   Wt 63 kg (139 lb)   BMI 23.13 kg/m²   Estimated body mass index is 23.13 kg/m² as calculated from the following:    Height as of this encounter: 165.1 cm (65\").    Weight as of this encounter: 63 kg (139 lb).       BMI is within normal parameters. No other follow-up for BMI required.      Physical Exam     physical exam is deferred due to visit being done via telephone.    Result Review :  The following data was reviewed by: ALANA Nicholson on 02/08/2023:  Common labs    Common Labs 8/17/22 9/29/22 9/29/22     1039 1039   Glucose 77 87    BUN 8 6    Creatinine 0.61 0.57    Sodium 137 138    Potassium 3.7 4.0    Chloride 99 102    Calcium 9.3 8.9    Albumin 4.60 4.30    Total Bilirubin 0.4 0.4    Alkaline Phosphatase 98 100    AST (SGOT) 18 24    ALT (SGPT) 14 30    Total Cholesterol   178   Triglycerides   69   HDL Cholesterol   81 (A)   LDL Cholesterol    84   (A) Abnormal value                         Assessment and Plan   Diagnoses and all orders for this visit:    1. Shortness of breath (Primary)  - "     CT Chest Without Contrast; Future    2. Pleurisy  -     CT Chest Without Contrast; Future    3. Centrilobular emphysema (HCC)    4. Nocturnal hypoxia    5. Cigarette nicotine dependence without complication    Other orders  -     predniSONE (DELTASONE) 10 MG tablet; Take 4 tabs daily x 3 days, then take 3 tabs daily x 3 days, then take 2 tabs daily x 3 days, then take 1 tab daily x 3 days  Dispense: 31 tablet; Refill: 0             Continue Stiolto once daily and Flovent BID.  Continue use of albuterol inhaler and nebs when needed.    She is complaint with use of supplemental oxygen at night.    She continues to smoke about 1/2 ppd.    Basilia Chamberlain  reports that she has been smoking cigarettes. She has been smoking an average of .5 packs per day. She has never used smokeless tobacco.. I have educated her on the risk of diseases from using tobacco products such as cancer, COPD and heart disease.     I advised her to quit and she is not willing to quit.    LDCT is not due until June 2023, however due to persistent lung pain I am ordering a CT chest without contrast.    Lung pain is like due to inflammation of the lung tissue caused by her COPD or pleurisy.    Sent in prescription for prednisone taper.          Follow Up   Return in about 4 weeks (around 3/8/2023).  Patient was given instructions and counseling regarding her condition or for health maintenance advice. Please see specific information pulled into the AVS if appropriate.     This visit has been rescheduled as a phone visit to comply with patient safety concerns in accordance with CDC recommendations. Total time of discussion was 22 minutes.

## 2023-03-06 RX ORDER — METOPROLOL SUCCINATE 25 MG/1
12.5 TABLET, EXTENDED RELEASE ORAL DAILY
Qty: 90 TABLET | Refills: 0 | Status: SHIPPED | OUTPATIENT
Start: 2023-03-06

## 2023-05-02 ENCOUNTER — OFFICE VISIT (OUTPATIENT)
Dept: CARDIOLOGY | Facility: CLINIC | Age: 58
End: 2023-05-02
Payer: COMMERCIAL

## 2023-05-02 ENCOUNTER — TELEPHONE (OUTPATIENT)
Dept: CARDIOLOGY | Facility: CLINIC | Age: 58
End: 2023-05-02

## 2023-05-02 VITALS
SYSTOLIC BLOOD PRESSURE: 143 MMHG | DIASTOLIC BLOOD PRESSURE: 83 MMHG | HEIGHT: 65 IN | HEART RATE: 94 BPM | WEIGHT: 147.4 LBS | RESPIRATION RATE: 18 BRPM | OXYGEN SATURATION: 96 % | BODY MASS INDEX: 24.56 KG/M2

## 2023-05-02 DIAGNOSIS — I47.1 PAROXYSMAL SVT (SUPRAVENTRICULAR TACHYCARDIA): ICD-10-CM

## 2023-05-02 DIAGNOSIS — E78.5 HYPERLIPIDEMIA, UNSPECIFIED HYPERLIPIDEMIA TYPE: Primary | ICD-10-CM

## 2023-05-02 DIAGNOSIS — R60.0 PEDAL EDEMA: ICD-10-CM

## 2023-05-02 DIAGNOSIS — R07.9 CHEST PAIN IN ADULT: ICD-10-CM

## 2023-05-02 PROCEDURE — 99214 OFFICE O/P EST MOD 30 MIN: CPT | Performed by: NURSE PRACTITIONER

## 2023-05-02 PROCEDURE — 1160F RVW MEDS BY RX/DR IN RCRD: CPT | Performed by: NURSE PRACTITIONER

## 2023-05-02 PROCEDURE — 1159F MED LIST DOCD IN RCRD: CPT | Performed by: NURSE PRACTITIONER

## 2023-05-02 RX ORDER — ASPIRIN 81 MG/1
81 TABLET ORAL DAILY
Qty: 90 TABLET | Refills: 1 | Status: SHIPPED | OUTPATIENT
Start: 2023-05-02

## 2023-05-02 RX ORDER — HYDROCHLOROTHIAZIDE 25 MG/1
12.5 TABLET ORAL DAILY
Qty: 45 TABLET | Refills: 1 | Status: SHIPPED | OUTPATIENT
Start: 2023-05-02

## 2023-05-02 RX ORDER — ATORVASTATIN CALCIUM 20 MG/1
20 TABLET, FILM COATED ORAL DAILY
Qty: 90 TABLET | Refills: 1 | Status: SHIPPED | OUTPATIENT
Start: 2023-05-02

## 2023-05-02 RX ORDER — METOPROLOL SUCCINATE 25 MG/1
12.5 TABLET, EXTENDED RELEASE ORAL DAILY
Qty: 45 TABLET | Refills: 1 | Status: SHIPPED | OUTPATIENT
Start: 2023-05-02 | End: 2023-05-02 | Stop reason: SDUPTHER

## 2023-05-02 RX ORDER — METOPROLOL SUCCINATE 25 MG/1
12.5 TABLET, EXTENDED RELEASE ORAL 2 TIMES DAILY
Qty: 90 TABLET | Refills: 1 | Status: SHIPPED | OUTPATIENT
Start: 2023-05-02

## 2023-05-02 NOTE — TELEPHONE ENCOUNTER
----- Message from ALANA Urbano sent at 5/2/2023  1:38 PM EDT -----  Received labs from PCP. Everything ok except cholesterol. Encourage in compliance and continue to monitor. IF not improved may need to consider different medications.   Isbaelle

## 2023-05-02 NOTE — LETTER
May 2, 2023     Yvonne Marr MD  [address] [place] [address]    Patient: Basilia Chamberlain   YOB: 1965   Date of Visit: 5/2/2023       Dear Yvonne Marr MD    Basilia Chamberlain was in my office today. Below is a copy of my note.    If you have questions, please do not hesitate to call me. I look forward to following Basilia along with you.         Sincerely,        ALANA Christiansen        CC: No Recipients    Subjective     Basilia Chamberlain is a 57 y.o. female.   Chief Complaint   Patient presents with   • Hyperlipidemia     Follow up      History of Present Illness   Basilia Chamberlain is a 57-year-old female who presents to clinic today for cardiology follow-up.    Hyperlipidemia currently on 20 mg daily.  She does admit to some missed doses.  She states she generally forgets to take it at nighttime.  We had tried to increase her to 40 mg however she had side effects.  She tries to eat healthier foods.     Paroxysmal SVT currently on Toprol XL 12.5 mg daily.  She feels this has helped symptoms however continues to have intermittent symptoms.  She did not try increasing to twice daily dosing as we discussed at last visit.  She denies dizziness, palpitations, worsening tachycardia, bradycardia, syncope.     Lower extremity swelling stable on HCTZ 12.5 mg daily.    She continues to experience intermittent chest discomfort.  Does not feel symptoms are worsening however intermittent.  She has had stress testing in the past with Dr. Sanchez however no results are available for review.  We have scheduled her for stress test but she has been unable to keep appointments for different reasons.  She is willing and agreeable to have testing rescheduled.  She remains on statin aspirin and beta-blocker.  No underlying history of DM and non-smoker.  History of pericardial effusion.       Patient Active Problem List   Diagnosis   • Abnormal mammogram   • Breast mass   •  Breast cyst   • Centrilobular emphysema   • Encounter for screening for malignant neoplasm of colon   • Hyperlipidemia   • Paroxysmal SVT (supraventricular tachycardia)   • Pericardial effusion   • Pedal edema     Past Medical History:   Diagnosis Date   • Anxiety 06/25/2018   • Arthritis    • COPD (chronic obstructive pulmonary disease)    • Elevated cholesterol    • Fibromyalgia    • Migraines    • RSD (reflex sympathetic dystrophy)      No past surgical history on file.    Family History   Problem Relation Age of Onset   • Hypertension Mother    • Hypertension Father    • Heart disease Sister    • Diabetes Sister    • Breast cancer Neg Hx      Social History     Tobacco Use   • Smoking status: Every Day     Packs/day: 0.50     Types: Cigarettes   • Smokeless tobacco: Never   • Tobacco comments:     smoked about 2 ppd for about 15 years. recently cut down to 1/2 ppd.   Vaping Use   • Vaping Use: Never used   Substance Use Topics   • Alcohol use: No   • Drug use: No         The following portions of the patient's history were reviewed and updated as appropriate: allergies, current medications, past family history, past medical history, past social history, past surgical history and problem list.    Allergies   Allergen Reactions   • Floxin [Ofloxacin]          Current Outpatient Medications:   •  acetaminophen (TYLENOL) 500 MG tablet, Take 1 tablet by mouth Every 6 (Six) Hours As Needed for Mild Pain., Disp: , Rfl:   •  albuterol sulfate  (90 Base) MCG/ACT inhaler, Inhale 2 puffs Every 4 (Four) Hours As Needed for Wheezing., Disp: 18 g, Rfl: 5  •  Allergy Relief 10 MG tablet, , Disp: , Rfl:   •  ALPRAZolam (XANAX) 2 MG tablet, Take 1 tablet by mouth., Disp: , Rfl:   •  amitriptyline (ELAVIL) 25 MG tablet, , Disp: , Rfl:   •  aspirin 81 MG EC tablet, Take 1 tablet by mouth Daily., Disp: 90 tablet, Rfl: 1  •  atorvastatin (LIPITOR) 20 MG tablet, Take 1 tablet by mouth Daily., Disp: 90 tablet, Rfl: 1  •   fluticasone (FLOVENT HFA) 220 MCG/ACT inhaler, Inhale 2 puffs 2 (Two) Times a Day., Disp: 12 g, Rfl: 5  •  hydroCHLOROthiazide (HYDRODIURIL) 25 MG tablet, Take 0.5 tablets by mouth Daily., Disp: 45 tablet, Rfl: 1  •  ibuprofen (ADVIL,MOTRIN) 800 MG tablet, , Disp: , Rfl:   •  ipratropium-albuterol (DUO-NEB) 0.5-2.5 mg/3 ml nebulizer, Take 3 mL by nebulization 4 (Four) Times a Day As Needed for Wheezing or Shortness of Air., Disp: 120 mL, Rfl: 5  •  methocarbamol (ROBAXIN) 750 MG tablet, Take 1 tablet by mouth 4 (Four) Times a Day As Needed for Muscle Spasms., Disp: , Rfl:   •  metoprolol succinate XL (TOPROL-XL) 25 MG 24 hr tablet, Take 0.5 tablets by mouth 2 (Two) Times a Day., Disp: 90 tablet, Rfl: 1  •  O2 (OXYGEN), Inhale 2 L/min Every Night., Disp: , Rfl:   •  predniSONE (DELTASONE) 10 MG tablet, Take 4 tabs daily x 3 days, then take 3 tabs daily x 3 days, then take 2 tabs daily x 3 days, then take 1 tab daily x 3 days, Disp: 31 tablet, Rfl: 0  •  Stiolto Respimat 2.5-2.5 MCG/ACT aerosol solution inhaler, Inhale 2 puffs Daily., Disp: 4 g, Rfl: 5  •  vitamin D (ERGOCALCIFEROL) 1.25 MG (21455 UT) capsule capsule, Take 1 capsule by mouth 1 (One) Time Per Week., Disp: , Rfl:     Review of Systems   Constitutional: Negative for activity change, appetite change, chills, diaphoresis, fatigue and fever.   HENT: Negative for congestion, drooling, ear discharge, ear pain, mouth sores, nosebleeds, postnasal drip, rhinorrhea, sinus pressure, sneezing and sore throat.    Eyes: Negative for pain, discharge and visual disturbance.   Respiratory: Negative for cough, chest tightness, shortness of breath and wheezing.    Cardiovascular: Positive for chest pain. Negative for palpitations and leg swelling.   Gastrointestinal: Negative for abdominal pain, constipation, diarrhea, nausea and vomiting.   Endocrine: Negative for cold intolerance, heat intolerance, polydipsia, polyphagia and polyuria.   Musculoskeletal: Negative for  "arthralgias, myalgias and neck pain.   Skin: Negative for rash and wound.   Neurological: Negative for dizziness, syncope, speech difficulty, weakness, light-headedness and headaches.   Hematological: Negative for adenopathy. Does not bruise/bleed easily.   Psychiatric/Behavioral: Negative for confusion, dysphoric mood and sleep disturbance. The patient is not nervous/anxious.    All other systems reviewed and are negative.    /83 (BP Location: Left arm, Patient Position: Sitting, Cuff Size: Adult)   Pulse 94   Resp 18   Ht 165.1 cm (65\")   Wt 66.9 kg (147 lb 6.4 oz)   LMP  (LMP Unknown)   SpO2 96%   BMI 24.53 kg/m²     Objective   Allergies   Allergen Reactions   • Floxin [Ofloxacin]        Physical Exam  Vitals reviewed.   Constitutional:       Appearance: Normal appearance. She is well-developed.   HENT:      Head: Normocephalic.   Eyes:      Conjunctiva/sclera: Conjunctivae normal.   Neck:      Thyroid: No thyromegaly.      Vascular: No carotid bruit or JVD.   Cardiovascular:      Rate and Rhythm: Normal rate and regular rhythm.   Pulmonary:      Effort: Pulmonary effort is normal.      Breath sounds: Normal breath sounds.   Musculoskeletal:      Cervical back: Neck supple.      Right lower leg: No edema.      Left lower leg: No edema.   Skin:     General: Skin is warm and dry.   Neurological:      Mental Status: She is alert and oriented to person, place, and time.   Psychiatric:         Attention and Perception: Attention normal.         Mood and Affect: Mood normal.         Speech: Speech normal.         Behavior: Behavior normal. Behavior is cooperative.         Cognition and Memory: Cognition normal.         LABS  WBC   Date Value Ref Range Status   12/10/2021 8.16 3.40 - 10.80 10*3/mm3 Final     RBC   Date Value Ref Range Status   12/10/2021 3.96 3.77 - 5.28 10*6/mm3 Final     Hemoglobin   Date Value Ref Range Status   12/10/2021 13.7 12.0 - 15.9 g/dL Final     Hematocrit   Date Value Ref " Range Status   12/10/2021 40.2 34.0 - 46.6 % Final     MCV   Date Value Ref Range Status   12/10/2021 101.5 (H) 79.0 - 97.0 fL Final     MCH   Date Value Ref Range Status   12/10/2021 34.6 (H) 26.6 - 33.0 pg Final     MCHC   Date Value Ref Range Status   12/10/2021 34.1 31.5 - 35.7 g/dL Final     RDW   Date Value Ref Range Status   12/10/2021 12.3 12.3 - 15.4 % Final     RDW-SD   Date Value Ref Range Status   12/10/2021 46.7 37.0 - 54.0 fl Final     MPV   Date Value Ref Range Status   12/10/2021 9.1 6.0 - 12.0 fL Final     Platelets   Date Value Ref Range Status   12/10/2021 307 140 - 450 10*3/mm3 Final     Neutrophil %   Date Value Ref Range Status   12/10/2021 76.0 42.7 - 76.0 % Final     Lymphocyte %   Date Value Ref Range Status   12/10/2021 17.0 (L) 19.6 - 45.3 % Final     Monocyte %   Date Value Ref Range Status   12/10/2021 5.9 5.0 - 12.0 % Final     Eosinophil %   Date Value Ref Range Status   12/10/2021 0.1 (L) 0.3 - 6.2 % Final     Basophil %   Date Value Ref Range Status   12/10/2021 0.6 0.0 - 1.5 % Final     Immature Grans %   Date Value Ref Range Status   12/10/2021 0.4 0.0 - 0.5 % Final     Neutrophils, Absolute   Date Value Ref Range Status   12/10/2021 6.20 1.70 - 7.00 10*3/mm3 Final     Lymphocytes, Absolute   Date Value Ref Range Status   12/10/2021 1.39 0.70 - 3.10 10*3/mm3 Final     Monocytes, Absolute   Date Value Ref Range Status   12/10/2021 0.48 0.10 - 0.90 10*3/mm3 Final     Eosinophils, Absolute   Date Value Ref Range Status   12/10/2021 0.01 0.00 - 0.40 10*3/mm3 Final     Basophils, Absolute   Date Value Ref Range Status   12/10/2021 0.05 0.00 - 0.20 10*3/mm3 Final     Immature Grans, Absolute   Date Value Ref Range Status   12/10/2021 0.03 0.00 - 0.05 10*3/mm3 Final     nRBC   Date Value Ref Range Status   12/10/2021 0.0 0.0 - 0.2 /100 WBC Final       Total Cholesterol   Date Value Ref Range Status   09/29/2022 178 0 - 200 mg/dL Final     Triglycerides   Date Value Ref Range Status    09/29/2022 69 0 - 150 mg/dL Final     HDL Cholesterol   Date Value Ref Range Status   09/29/2022 81 (H) 40 - 60 mg/dL Final     LDL Cholesterol    Date Value Ref Range Status   09/29/2022 84 0 - 100 mg/dL Final       Imaging  XR Chest 2 View    Result Date: 2/3/2023  No radiographic evidence of acute cardiac or pulmonary disease.  This report was finalized on 2/3/2023 9:41 AM by Dr. Daniel Juárez MD.              Assessment & Plan   Diagnoses and all orders for this visit:    1. Hyperlipidemia, unspecified hyperlipidemia type (Primary)  Request most recent labs from PCP  Continue statin  Healthy diet    2. Paroxysmal SVT (supraventricular tachycardia)  Continue Toprol, recommend increasing to twice daily dosing as she has intermittent symptoms but improved.  Recommend avoidance of caffeine/stimulants    3. Chest pain in adult  -     Stress Test With Myocardial Perfusion (1 Day); Future  Reschedule stress test      4. Pedal edema  Stable on HCTZ, compression socks, sodium restrictions    Other orders  -     aspirin 81 MG EC tablet; Take 1 tablet by mouth Daily.  Dispense: 90 tablet; Refill: 1  -     atorvastatin (LIPITOR) 20 MG tablet; Take 1 tablet by mouth Daily.  Dispense: 90 tablet; Refill: 1  -     hydroCHLOROthiazide (HYDRODIURIL) 25 MG tablet; Take 0.5 tablets by mouth Daily.  Dispense: 45 tablet; Refill: 1  -     Discontinue: metoprolol succinate XL (TOPROL-XL) 25 MG 24 hr tablet; Take 0.5 tablets by mouth Daily.  Dispense: 45 tablet; Refill: 1  -     metoprolol succinate XL (TOPROL-XL) 25 MG 24 hr tablet; Take 0.5 tablets by mouth 2 (Two) Times a Day.  Dispense: 90 tablet; Refill: 1      BP slightly elevated in clinic today, will continue to monitor  Request labs from PCP  Increase Toprol  Follow-up in 4 to 6 months, sooner if needed

## 2023-05-02 NOTE — TELEPHONE ENCOUNTER
Called pt and informed her of the following per Isabelle Bunn APRN  Received labs from PCP. Everything ok except cholesterol. Encourage in compliance and continue to monitor. IF not improved may need to consider different medications.   Isabelle

## 2023-05-02 NOTE — PROGRESS NOTES
Subjective     Basilia Chamberlain is a 57 y.o. female.   Chief Complaint   Patient presents with   • Hyperlipidemia     Follow up      History of Present Illness   Basilia Chamberlain is a 57-year-old female who presents to clinic today for cardiology follow-up.    Hyperlipidemia currently on 20 mg daily.  She does admit to some missed doses.  She states she generally forgets to take it at nighttime.  We had tried to increase her to 40 mg however she had side effects.  She tries to eat healthier foods.     Paroxysmal SVT currently on Toprol XL 12.5 mg daily.  She feels this has helped symptoms however continues to have intermittent symptoms.  She did not try increasing to twice daily dosing as we discussed at last visit.  She denies dizziness, palpitations, worsening tachycardia, bradycardia, syncope.     Lower extremity swelling stable on HCTZ 12.5 mg daily.    She continues to experience intermittent chest discomfort.  Does not feel symptoms are worsening however intermittent.  She has had stress testing in the past with Dr. Sanchez however no results are available for review.  We have scheduled her for stress test but she has been unable to keep appointments for different reasons.  She is willing and agreeable to have testing rescheduled.  She remains on statin aspirin and beta-blocker.  No underlying history of DM and non-smoker.  History of pericardial effusion.       Patient Active Problem List   Diagnosis   • Abnormal mammogram   • Breast mass   • Breast cyst   • Centrilobular emphysema   • Encounter for screening for malignant neoplasm of colon   • Hyperlipidemia   • Paroxysmal SVT (supraventricular tachycardia)   • Pericardial effusion   • Pedal edema     Past Medical History:   Diagnosis Date   • Anxiety 06/25/2018   • Arthritis    • COPD (chronic obstructive pulmonary disease)    • Elevated cholesterol    • Fibromyalgia    • Migraines    • RSD (reflex sympathetic dystrophy)      No past  surgical history on file.    Family History   Problem Relation Age of Onset   • Hypertension Mother    • Hypertension Father    • Heart disease Sister    • Diabetes Sister    • Breast cancer Neg Hx      Social History     Tobacco Use   • Smoking status: Every Day     Packs/day: 0.50     Types: Cigarettes   • Smokeless tobacco: Never   • Tobacco comments:     smoked about 2 ppd for about 15 years. recently cut down to 1/2 ppd.   Vaping Use   • Vaping Use: Never used   Substance Use Topics   • Alcohol use: No   • Drug use: No         The following portions of the patient's history were reviewed and updated as appropriate: allergies, current medications, past family history, past medical history, past social history, past surgical history and problem list.    Allergies   Allergen Reactions   • Floxin [Ofloxacin]          Current Outpatient Medications:   •  acetaminophen (TYLENOL) 500 MG tablet, Take 1 tablet by mouth Every 6 (Six) Hours As Needed for Mild Pain., Disp: , Rfl:   •  albuterol sulfate  (90 Base) MCG/ACT inhaler, Inhale 2 puffs Every 4 (Four) Hours As Needed for Wheezing., Disp: 18 g, Rfl: 5  •  Allergy Relief 10 MG tablet, , Disp: , Rfl:   •  ALPRAZolam (XANAX) 2 MG tablet, Take 1 tablet by mouth., Disp: , Rfl:   •  amitriptyline (ELAVIL) 25 MG tablet, , Disp: , Rfl:   •  aspirin 81 MG EC tablet, Take 1 tablet by mouth Daily., Disp: 90 tablet, Rfl: 1  •  atorvastatin (LIPITOR) 20 MG tablet, Take 1 tablet by mouth Daily., Disp: 90 tablet, Rfl: 1  •  fluticasone (FLOVENT HFA) 220 MCG/ACT inhaler, Inhale 2 puffs 2 (Two) Times a Day., Disp: 12 g, Rfl: 5  •  hydroCHLOROthiazide (HYDRODIURIL) 25 MG tablet, Take 0.5 tablets by mouth Daily., Disp: 45 tablet, Rfl: 1  •  ibuprofen (ADVIL,MOTRIN) 800 MG tablet, , Disp: , Rfl:   •  ipratropium-albuterol (DUO-NEB) 0.5-2.5 mg/3 ml nebulizer, Take 3 mL by nebulization 4 (Four) Times a Day As Needed for Wheezing or Shortness of Air., Disp: 120 mL, Rfl: 5  •   methocarbamol (ROBAXIN) 750 MG tablet, Take 1 tablet by mouth 4 (Four) Times a Day As Needed for Muscle Spasms., Disp: , Rfl:   •  metoprolol succinate XL (TOPROL-XL) 25 MG 24 hr tablet, Take 0.5 tablets by mouth 2 (Two) Times a Day., Disp: 90 tablet, Rfl: 1  •  O2 (OXYGEN), Inhale 2 L/min Every Night., Disp: , Rfl:   •  predniSONE (DELTASONE) 10 MG tablet, Take 4 tabs daily x 3 days, then take 3 tabs daily x 3 days, then take 2 tabs daily x 3 days, then take 1 tab daily x 3 days, Disp: 31 tablet, Rfl: 0  •  Stiolto Respimat 2.5-2.5 MCG/ACT aerosol solution inhaler, Inhale 2 puffs Daily., Disp: 4 g, Rfl: 5  •  vitamin D (ERGOCALCIFEROL) 1.25 MG (40621 UT) capsule capsule, Take 1 capsule by mouth 1 (One) Time Per Week., Disp: , Rfl:     Review of Systems   Constitutional: Negative for activity change, appetite change, chills, diaphoresis, fatigue and fever.   HENT: Negative for congestion, drooling, ear discharge, ear pain, mouth sores, nosebleeds, postnasal drip, rhinorrhea, sinus pressure, sneezing and sore throat.    Eyes: Negative for pain, discharge and visual disturbance.   Respiratory: Negative for cough, chest tightness, shortness of breath and wheezing.    Cardiovascular: Positive for chest pain. Negative for palpitations and leg swelling.   Gastrointestinal: Negative for abdominal pain, constipation, diarrhea, nausea and vomiting.   Endocrine: Negative for cold intolerance, heat intolerance, polydipsia, polyphagia and polyuria.   Musculoskeletal: Negative for arthralgias, myalgias and neck pain.   Skin: Negative for rash and wound.   Neurological: Negative for dizziness, syncope, speech difficulty, weakness, light-headedness and headaches.   Hematological: Negative for adenopathy. Does not bruise/bleed easily.   Psychiatric/Behavioral: Negative for confusion, dysphoric mood and sleep disturbance. The patient is not nervous/anxious.    All other systems reviewed and are negative.    /83 (BP Location:  "Left arm, Patient Position: Sitting, Cuff Size: Adult)   Pulse 94   Resp 18   Ht 165.1 cm (65\")   Wt 66.9 kg (147 lb 6.4 oz)   LMP  (LMP Unknown)   SpO2 96%   BMI 24.53 kg/m²     Objective   Allergies   Allergen Reactions   • Floxin [Ofloxacin]        Physical Exam  Vitals reviewed.   Constitutional:       Appearance: Normal appearance. She is well-developed.   HENT:      Head: Normocephalic.   Eyes:      Conjunctiva/sclera: Conjunctivae normal.   Neck:      Thyroid: No thyromegaly.      Vascular: No carotid bruit or JVD.   Cardiovascular:      Rate and Rhythm: Normal rate and regular rhythm.   Pulmonary:      Effort: Pulmonary effort is normal.      Breath sounds: Normal breath sounds.   Musculoskeletal:      Cervical back: Neck supple.      Right lower leg: No edema.      Left lower leg: No edema.   Skin:     General: Skin is warm and dry.   Neurological:      Mental Status: She is alert and oriented to person, place, and time.   Psychiatric:         Attention and Perception: Attention normal.         Mood and Affect: Mood normal.         Speech: Speech normal.         Behavior: Behavior normal. Behavior is cooperative.         Cognition and Memory: Cognition normal.         LABS  WBC   Date Value Ref Range Status   12/10/2021 8.16 3.40 - 10.80 10*3/mm3 Final     RBC   Date Value Ref Range Status   12/10/2021 3.96 3.77 - 5.28 10*6/mm3 Final     Hemoglobin   Date Value Ref Range Status   12/10/2021 13.7 12.0 - 15.9 g/dL Final     Hematocrit   Date Value Ref Range Status   12/10/2021 40.2 34.0 - 46.6 % Final     MCV   Date Value Ref Range Status   12/10/2021 101.5 (H) 79.0 - 97.0 fL Final     MCH   Date Value Ref Range Status   12/10/2021 34.6 (H) 26.6 - 33.0 pg Final     MCHC   Date Value Ref Range Status   12/10/2021 34.1 31.5 - 35.7 g/dL Final     RDW   Date Value Ref Range Status   12/10/2021 12.3 12.3 - 15.4 % Final     RDW-SD   Date Value Ref Range Status   12/10/2021 46.7 37.0 - 54.0 fl Final     MPV "   Date Value Ref Range Status   12/10/2021 9.1 6.0 - 12.0 fL Final     Platelets   Date Value Ref Range Status   12/10/2021 307 140 - 450 10*3/mm3 Final     Neutrophil %   Date Value Ref Range Status   12/10/2021 76.0 42.7 - 76.0 % Final     Lymphocyte %   Date Value Ref Range Status   12/10/2021 17.0 (L) 19.6 - 45.3 % Final     Monocyte %   Date Value Ref Range Status   12/10/2021 5.9 5.0 - 12.0 % Final     Eosinophil %   Date Value Ref Range Status   12/10/2021 0.1 (L) 0.3 - 6.2 % Final     Basophil %   Date Value Ref Range Status   12/10/2021 0.6 0.0 - 1.5 % Final     Immature Grans %   Date Value Ref Range Status   12/10/2021 0.4 0.0 - 0.5 % Final     Neutrophils, Absolute   Date Value Ref Range Status   12/10/2021 6.20 1.70 - 7.00 10*3/mm3 Final     Lymphocytes, Absolute   Date Value Ref Range Status   12/10/2021 1.39 0.70 - 3.10 10*3/mm3 Final     Monocytes, Absolute   Date Value Ref Range Status   12/10/2021 0.48 0.10 - 0.90 10*3/mm3 Final     Eosinophils, Absolute   Date Value Ref Range Status   12/10/2021 0.01 0.00 - 0.40 10*3/mm3 Final     Basophils, Absolute   Date Value Ref Range Status   12/10/2021 0.05 0.00 - 0.20 10*3/mm3 Final     Immature Grans, Absolute   Date Value Ref Range Status   12/10/2021 0.03 0.00 - 0.05 10*3/mm3 Final     nRBC   Date Value Ref Range Status   12/10/2021 0.0 0.0 - 0.2 /100 WBC Final       Total Cholesterol   Date Value Ref Range Status   09/29/2022 178 0 - 200 mg/dL Final     Triglycerides   Date Value Ref Range Status   09/29/2022 69 0 - 150 mg/dL Final     HDL Cholesterol   Date Value Ref Range Status   09/29/2022 81 (H) 40 - 60 mg/dL Final     LDL Cholesterol    Date Value Ref Range Status   09/29/2022 84 0 - 100 mg/dL Final       Imaging  XR Chest 2 View    Result Date: 2/3/2023  No radiographic evidence of acute cardiac or pulmonary disease.  This report was finalized on 2/3/2023 9:41 AM by Dr. Daniel Juárez MD.              Assessment & Plan   Diagnoses and all orders  for this visit:    1. Hyperlipidemia, unspecified hyperlipidemia type (Primary)  Request most recent labs from PCP  Continue statin  Healthy diet    2. Paroxysmal SVT (supraventricular tachycardia)  Continue Toprol, recommend increasing to twice daily dosing as she has intermittent symptoms but improved.  Recommend avoidance of caffeine/stimulants    3. Chest pain in adult  -     Stress Test With Myocardial Perfusion (1 Day); Future  Reschedule stress test      4. Pedal edema  Stable on HCTZ, compression socks, sodium restrictions    Other orders  -     aspirin 81 MG EC tablet; Take 1 tablet by mouth Daily.  Dispense: 90 tablet; Refill: 1  -     atorvastatin (LIPITOR) 20 MG tablet; Take 1 tablet by mouth Daily.  Dispense: 90 tablet; Refill: 1  -     hydroCHLOROthiazide (HYDRODIURIL) 25 MG tablet; Take 0.5 tablets by mouth Daily.  Dispense: 45 tablet; Refill: 1  -     Discontinue: metoprolol succinate XL (TOPROL-XL) 25 MG 24 hr tablet; Take 0.5 tablets by mouth Daily.  Dispense: 45 tablet; Refill: 1  -     metoprolol succinate XL (TOPROL-XL) 25 MG 24 hr tablet; Take 0.5 tablets by mouth 2 (Two) Times a Day.  Dispense: 90 tablet; Refill: 1      BP slightly elevated in clinic today, will continue to monitor  Request labs from PCP  Increase Toprol  Follow-up in 4 to 6 months, sooner if needed

## 2023-05-24 ENCOUNTER — HOSPITAL ENCOUNTER (EMERGENCY)
Facility: HOSPITAL | Age: 58
Discharge: LEFT BEFORE DISCHARGE | End: 2023-05-24
Payer: COMMERCIAL

## 2023-05-24 VITALS
HEART RATE: 95 BPM | RESPIRATION RATE: 18 BRPM | HEIGHT: 65 IN | WEIGHT: 139 LBS | DIASTOLIC BLOOD PRESSURE: 79 MMHG | OXYGEN SATURATION: 99 % | TEMPERATURE: 97.5 F | SYSTOLIC BLOOD PRESSURE: 146 MMHG | BODY MASS INDEX: 23.16 KG/M2

## 2023-05-24 PROCEDURE — 99211 OFF/OP EST MAY X REQ PHY/QHP: CPT

## 2023-05-24 NOTE — ED NOTES
Called for pt additional 2 times for rooming. Pt not visualized in ER lobby or x-ray waiting. No response from patient.

## 2023-05-27 ENCOUNTER — HOSPITAL ENCOUNTER (EMERGENCY)
Facility: HOSPITAL | Age: 58
Discharge: HOME | End: 2023-05-27
Attending: STUDENT IN AN ORGANIZED HEALTH CARE EDUCATION/TRAINING PROGRAM
Payer: COMMERCIAL

## 2023-05-27 ENCOUNTER — APPOINTMENT (OUTPATIENT)
Dept: CT IMAGING | Facility: HOSPITAL | Age: 58
End: 2023-05-27
Payer: COMMERCIAL

## 2023-05-27 ENCOUNTER — APPOINTMENT (OUTPATIENT)
Dept: ULTRASOUND IMAGING | Facility: HOSPITAL | Age: 58
End: 2023-05-27
Payer: COMMERCIAL

## 2023-05-27 VITALS
HEIGHT: 65 IN | HEART RATE: 90 BPM | TEMPERATURE: 98 F | DIASTOLIC BLOOD PRESSURE: 73 MMHG | WEIGHT: 139 LBS | SYSTOLIC BLOOD PRESSURE: 138 MMHG | BODY MASS INDEX: 23.16 KG/M2 | OXYGEN SATURATION: 98 % | RESPIRATION RATE: 16 BRPM

## 2023-05-27 DIAGNOSIS — R10.31 RLQ ABDOMINAL PAIN: Primary | ICD-10-CM

## 2023-05-27 LAB
ALBUMIN SERPL-MCNC: 4.8 G/DL (ref 3.5–5.2)
ALBUMIN/GLOB SERPL: 1.5 G/DL
ALP SERPL-CCNC: 114 U/L (ref 39–117)
ALT SERPL W P-5'-P-CCNC: 11 U/L (ref 1–33)
ANION GAP SERPL CALCULATED.3IONS-SCNC: 10.8 MMOL/L (ref 5–15)
AST SERPL-CCNC: 17 U/L (ref 1–32)
BACTERIA UR QL AUTO: ABNORMAL /HPF
BASOPHILS # BLD AUTO: 0.09 10*3/MM3 (ref 0–0.2)
BASOPHILS NFR BLD AUTO: 0.9 % (ref 0–1.5)
BILIRUB SERPL-MCNC: 0.3 MG/DL (ref 0–1.2)
BILIRUB UR QL STRIP: NEGATIVE
BUN SERPL-MCNC: 6 MG/DL (ref 6–20)
BUN/CREAT SERPL: 9.4 (ref 7–25)
CALCIUM SPEC-SCNC: 9.3 MG/DL (ref 8.6–10.5)
CHLORIDE SERPL-SCNC: 106 MMOL/L (ref 98–107)
CLARITY UR: CLEAR
CO2 SERPL-SCNC: 24.2 MMOL/L (ref 22–29)
COLOR UR: YELLOW
CREAT SERPL-MCNC: 0.64 MG/DL (ref 0.57–1)
CRP SERPL-MCNC: <0.3 MG/DL (ref 0–0.5)
D-LACTATE SERPL-SCNC: 1 MMOL/L (ref 0.5–2)
DEPRECATED RDW RBC AUTO: 48.8 FL (ref 37–54)
EGFRCR SERPLBLD CKD-EPI 2021: 104.5 ML/MIN/1.73
EOSINOPHIL # BLD AUTO: 0.08 10*3/MM3 (ref 0–0.4)
EOSINOPHIL NFR BLD AUTO: 0.8 % (ref 0.3–6.2)
ERYTHROCYTE [DISTWIDTH] IN BLOOD BY AUTOMATED COUNT: 12.5 % (ref 12.3–15.4)
ERYTHROCYTE [SEDIMENTATION RATE] IN BLOOD: 14 MM/HR (ref 0–30)
FOLATE SERPL-MCNC: 17 NG/ML (ref 4.78–24.2)
GLOBULIN UR ELPH-MCNC: 3.1 GM/DL
GLUCOSE SERPL-MCNC: 115 MG/DL (ref 65–99)
GLUCOSE UR STRIP-MCNC: NEGATIVE MG/DL
HCT VFR BLD AUTO: 46 % (ref 34–46.6)
HGB BLD-MCNC: 15.6 G/DL (ref 12–15.9)
HGB UR QL STRIP.AUTO: ABNORMAL
HOLD SPECIMEN: NORMAL
HOLD SPECIMEN: NORMAL
HYALINE CASTS UR QL AUTO: ABNORMAL /LPF
IMM GRANULOCYTES # BLD AUTO: 0.05 10*3/MM3 (ref 0–0.05)
IMM GRANULOCYTES NFR BLD AUTO: 0.5 % (ref 0–0.5)
KETONES UR QL STRIP: NEGATIVE
LEUKOCYTE ESTERASE UR QL STRIP.AUTO: ABNORMAL
LYMPHOCYTES # BLD AUTO: 3.37 10*3/MM3 (ref 0.7–3.1)
LYMPHOCYTES NFR BLD AUTO: 31.9 % (ref 19.6–45.3)
MCH RBC QN AUTO: 35.8 PG (ref 26.6–33)
MCHC RBC AUTO-ENTMCNC: 33.9 G/DL (ref 31.5–35.7)
MCV RBC AUTO: 105.5 FL (ref 79–97)
MONOCYTES # BLD AUTO: 0.47 10*3/MM3 (ref 0.1–0.9)
MONOCYTES NFR BLD AUTO: 4.4 % (ref 5–12)
NEUTROPHILS NFR BLD AUTO: 6.51 10*3/MM3 (ref 1.7–7)
NEUTROPHILS NFR BLD AUTO: 61.5 % (ref 42.7–76)
NITRITE UR QL STRIP: NEGATIVE
NRBC BLD AUTO-RTO: 0 /100 WBC (ref 0–0.2)
PH UR STRIP.AUTO: 6 [PH] (ref 5–8)
PLATELET # BLD AUTO: 354 10*3/MM3 (ref 140–450)
PMV BLD AUTO: 9 FL (ref 6–12)
POTASSIUM SERPL-SCNC: 4.2 MMOL/L (ref 3.5–5.2)
PROT SERPL-MCNC: 7.9 G/DL (ref 6–8.5)
PROT UR QL STRIP: NEGATIVE
RBC # BLD AUTO: 4.36 10*6/MM3 (ref 3.77–5.28)
RBC # UR STRIP: ABNORMAL /HPF
REF LAB TEST METHOD: ABNORMAL
SODIUM SERPL-SCNC: 141 MMOL/L (ref 136–145)
SP GR UR STRIP: 1.01 (ref 1–1.03)
SQUAMOUS #/AREA URNS HPF: ABNORMAL /HPF
UROBILINOGEN UR QL STRIP: ABNORMAL
VIT B12 BLD-MCNC: 512 PG/ML (ref 211–946)
WBC # UR STRIP: ABNORMAL /HPF
WBC NRBC COR # BLD: 10.57 10*3/MM3 (ref 3.4–10.8)
WHOLE BLOOD HOLD COAG: NORMAL
WHOLE BLOOD HOLD SPECIMEN: NORMAL

## 2023-05-27 PROCEDURE — 83605 ASSAY OF LACTIC ACID: CPT | Performed by: PHYSICIAN ASSISTANT

## 2023-05-27 PROCEDURE — 82746 ASSAY OF FOLIC ACID SERUM: CPT | Performed by: PHYSICIAN ASSISTANT

## 2023-05-27 PROCEDURE — 85025 COMPLETE CBC W/AUTO DIFF WBC: CPT | Performed by: PHYSICIAN ASSISTANT

## 2023-05-27 PROCEDURE — 99283 EMERGENCY DEPT VISIT LOW MDM: CPT

## 2023-05-27 PROCEDURE — 86140 C-REACTIVE PROTEIN: CPT | Performed by: PHYSICIAN ASSISTANT

## 2023-05-27 PROCEDURE — 25510000001 IOPAMIDOL 61 % SOLUTION: Performed by: STUDENT IN AN ORGANIZED HEALTH CARE EDUCATION/TRAINING PROGRAM

## 2023-05-27 PROCEDURE — 87086 URINE CULTURE/COLONY COUNT: CPT | Performed by: PHYSICIAN ASSISTANT

## 2023-05-27 PROCEDURE — 82607 VITAMIN B-12: CPT | Performed by: PHYSICIAN ASSISTANT

## 2023-05-27 PROCEDURE — 81001 URINALYSIS AUTO W/SCOPE: CPT | Performed by: PHYSICIAN ASSISTANT

## 2023-05-27 PROCEDURE — 36415 COLL VENOUS BLD VENIPUNCTURE: CPT

## 2023-05-27 PROCEDURE — 87040 BLOOD CULTURE FOR BACTERIA: CPT | Performed by: PHYSICIAN ASSISTANT

## 2023-05-27 PROCEDURE — 80053 COMPREHEN METABOLIC PANEL: CPT | Performed by: PHYSICIAN ASSISTANT

## 2023-05-27 PROCEDURE — 74177 CT ABD & PELVIS W/CONTRAST: CPT

## 2023-05-27 PROCEDURE — 85652 RBC SED RATE AUTOMATED: CPT | Performed by: PHYSICIAN ASSISTANT

## 2023-05-27 RX ORDER — SODIUM CHLORIDE 0.9 % (FLUSH) 0.9 %
10 SYRINGE (ML) INJECTION AS NEEDED
Status: DISCONTINUED | OUTPATIENT
Start: 2023-05-27 | End: 2023-05-27 | Stop reason: HOSPADM

## 2023-05-27 RX ADMIN — IOPAMIDOL 70 ML: 612 INJECTION, SOLUTION INTRAVENOUS at 09:38

## 2023-05-27 RX ADMIN — SODIUM CHLORIDE 1000 ML: 9 INJECTION, SOLUTION INTRAVENOUS at 08:48

## 2023-05-27 NOTE — ED PROVIDER NOTES
Subjective   History of Present Illness  57-year-old female presents secondary to right lower quadrant abdominal pain.  Patient states that she has been having some lower abdominal pain over the past 6 months.  She was seen in [place].  She states that she subsequently saw her primary care recently who checked her urine and told her that she had an infection.  She states that she was placed on 2 different antibiotics that she does not remember the names however she was allergic to these.  She states that she was given a third 1 however she did not take it because she was concerned.  She did have some leftover amoxicillin which she did take.  She has never had a colonoscopy.  She states that at times she does have mucoid stools.  She voices no other complaints this time.  She has a past medical history of COPD, hyperlipidemia, fibromyalgia, anxiety/depression.  She presented by private vehicle.        Review of Systems   Constitutional: Negative.  Negative for fever.   HENT: Negative.    Respiratory: Negative.    Cardiovascular: Negative.  Negative for chest pain.   Gastrointestinal: Positive for abdominal pain and diarrhea.   Endocrine: Negative.    Genitourinary: Negative.  Negative for dysuria.   Skin: Negative.    Neurological: Negative.    Psychiatric/Behavioral: Negative.    All other systems reviewed and are negative.      Past Medical History:   Diagnosis Date   • Anxiety 06/25/2018   • Arthritis    • COPD (chronic obstructive pulmonary disease)    • Elevated cholesterol    • Fibromyalgia    • Migraines    • RSD (reflex sympathetic dystrophy)        Allergies   Allergen Reactions   • Floxin [Ofloxacin]        No past surgical history on file.    Family History   Problem Relation Age of Onset   • Hypertension Mother    • Hypertension Father    • Heart disease Sister    • Diabetes Sister    • Breast cancer Neg Hx        Social History     Socioeconomic History   • Marital status:    Tobacco Use   •  Smoking status: Every Day     Packs/day: 0.50     Types: Cigarettes   • Smokeless tobacco: Never   • Tobacco comments:     smoked about 2 ppd for about 15 years. recently cut down to 1/2 ppd.   Vaping Use   • Vaping Use: Never used   Substance and Sexual Activity   • Alcohol use: No   • Drug use: No   • Sexual activity: Defer           Objective   Physical Exam  Vitals and nursing note reviewed.   Constitutional:       General: She is not in acute distress.     Appearance: She is well-developed. She is not diaphoretic.   HENT:      Head: Normocephalic and atraumatic.      Right Ear: External ear normal.      Left Ear: External ear normal.      Nose: Nose normal.   Eyes:      Conjunctiva/sclera: Conjunctivae normal.      Pupils: Pupils are equal, round, and reactive to light.   Neck:      Vascular: No JVD.      Trachea: No tracheal deviation.   Cardiovascular:      Rate and Rhythm: Normal rate and regular rhythm.      Heart sounds: Normal heart sounds. No murmur heard.  Pulmonary:      Effort: Pulmonary effort is normal. No respiratory distress.      Breath sounds: Normal breath sounds. No wheezing.   Abdominal:      General: Bowel sounds are normal.      Palpations: Abdomen is soft.      Tenderness: There is abdominal tenderness in the right lower quadrant.   Musculoskeletal:         General: No deformity. Normal range of motion.      Cervical back: Normal range of motion and neck supple.   Skin:     General: Skin is warm and dry.      Coloration: Skin is not pale.      Findings: No erythema or rash.   Neurological:      Mental Status: She is alert and oriented to person, place, and time.      Cranial Nerves: No cranial nerve deficit.   Psychiatric:         Behavior: Behavior normal.         Thought Content: Thought content normal.         Procedures           ED Course           Results for orders placed or performed during the hospital encounter of 05/27/23   Comprehensive Metabolic Panel    Specimen: Blood    Result Value Ref Range    Glucose 115 (H) 65 - 99 mg/dL    BUN 6 6 - 20 mg/dL    Creatinine 0.64 0.57 - 1.00 mg/dL    Sodium 141 136 - 145 mmol/L    Potassium 4.2 3.5 - 5.2 mmol/L    Chloride 106 98 - 107 mmol/L    CO2 24.2 22.0 - 29.0 mmol/L    Calcium 9.3 8.6 - 10.5 mg/dL    Total Protein 7.9 6.0 - 8.5 g/dL    Albumin 4.8 3.5 - 5.2 g/dL    ALT (SGPT) 11 1 - 33 U/L    AST (SGOT) 17 1 - 32 U/L    Alkaline Phosphatase 114 39 - 117 U/L    Total Bilirubin 0.3 0.0 - 1.2 mg/dL    Globulin 3.1 gm/dL    A/G Ratio 1.5 g/dL    BUN/Creatinine Ratio 9.4 7.0 - 25.0    Anion Gap 10.8 5.0 - 15.0 mmol/L    eGFR 104.5 >60.0 mL/min/1.73   Urinalysis With Culture If Indicated - Urine, Clean Catch    Specimen: Urine, Clean Catch   Result Value Ref Range    Color, UA Yellow Yellow, Straw    Appearance, UA Clear Clear    pH, UA 6.0 5.0 - 8.0    Specific Gravity, UA 1.008 1.005 - 1.030    Glucose, UA Negative Negative    Ketones, UA Negative Negative    Bilirubin, UA Negative Negative    Blood, UA Small (1+) (A) Negative    Protein, UA Negative Negative    Leuk Esterase, UA Moderate (2+) (A) Negative    Nitrite, UA Negative Negative    Urobilinogen, UA 0.2 E.U./dL 0.2 - 1.0 E.U./dL   C-reactive Protein    Specimen: Blood   Result Value Ref Range    C-Reactive Protein <0.30 0.00 - 0.50 mg/dL   Sedimentation Rate    Specimen: Blood   Result Value Ref Range    Sed Rate 14 0 - 30 mm/hr   Lactic Acid, Plasma    Specimen: Blood   Result Value Ref Range    Lactate 1.0 0.5 - 2.0 mmol/L   CBC Auto Differential    Specimen: Blood   Result Value Ref Range    WBC 10.57 3.40 - 10.80 10*3/mm3    RBC 4.36 3.77 - 5.28 10*6/mm3    Hemoglobin 15.6 12.0 - 15.9 g/dL    Hematocrit 46.0 34.0 - 46.6 %    .5 (H) 79.0 - 97.0 fL    MCH 35.8 (H) 26.6 - 33.0 pg    MCHC 33.9 31.5 - 35.7 g/dL    RDW 12.5 12.3 - 15.4 %    RDW-SD 48.8 37.0 - 54.0 fl    MPV 9.0 6.0 - 12.0 fL    Platelets 354 140 - 450 10*3/mm3    Neutrophil % 61.5 42.7 - 76.0 %    Lymphocyte %  31.9 19.6 - 45.3 %    Monocyte % 4.4 (L) 5.0 - 12.0 %    Eosinophil % 0.8 0.3 - 6.2 %    Basophil % 0.9 0.0 - 1.5 %    Immature Grans % 0.5 0.0 - 0.5 %    Neutrophils, Absolute 6.51 1.70 - 7.00 10*3/mm3    Lymphocytes, Absolute 3.37 (H) 0.70 - 3.10 10*3/mm3    Monocytes, Absolute 0.47 0.10 - 0.90 10*3/mm3    Eosinophils, Absolute 0.08 0.00 - 0.40 10*3/mm3    Basophils, Absolute 0.09 0.00 - 0.20 10*3/mm3    Immature Grans, Absolute 0.05 0.00 - 0.05 10*3/mm3    nRBC 0.0 0.0 - 0.2 /100 WBC   Urinalysis, Microscopic Only - Urine, Clean Catch    Specimen: Urine, Clean Catch   Result Value Ref Range    RBC, UA 3-5 (A) None Seen, 0-2 /HPF    WBC, UA 13-20 (A) None Seen, 0-2 /HPF    Bacteria, UA 1+ (A) None Seen /HPF    Squamous Epithelial Cells, UA 7-12 (A) None Seen, 0-2 /HPF    Hyaline Casts, UA None Seen None Seen /LPF    Methodology Automated Microscopy    Green Top (Gel)   Result Value Ref Range    Extra Tube Hold for add-ons.    Lavender Top   Result Value Ref Range    Extra Tube hold for add-on    Gold Top - SST   Result Value Ref Range    Extra Tube Hold for add-ons.    Light Blue Top   Result Value Ref Range    Extra Tube Hold for add-ons.      CT Abdomen Pelvis With Contrast    Result Date: 5/27/2023  Impression: 1. No acute process.  This report was finalized on 5/27/2023 11:10 AM by Kapil Nicholson DO.                                      Medical Decision Making  57-year-old female presents secondary to right lower quadrant abdominal pain.  Patient states that she has been having some lower abdominal pain over the past 6 months.  She was seen in [place].  She states that she subsequently saw her primary care recently who checked her urine and told her that she had an infection.  She states that she was placed on 2 different antibiotics that she does not remember the names however she was allergic to these.  She states that she was given a third 1 however she did not take it because she was concerned.  She  did have some leftover amoxicillin which she did take.  She has never had a colonoscopy.  She states that at times she does have mucoid stools.  She voices no other complaints this time.  She has a past medical history of COPD, hyperlipidemia, fibromyalgia, anxiety/depression.  She presented by private vehicle.    RLQ abdominal pain: acute illness or injury  Amount and/or Complexity of Data Reviewed  Labs: ordered. Decision-making details documented in ED Course.  Radiology: ordered.      Risk  Prescription drug management.    Risk Details: Patient was counts about her diagnostic work-up and labs.  Patient declined having a transvaginal ultrasound after her transabdominal ultrasound was not feasible.  Patient was counseled about the need for follow-up in regards to her pain intermittently over the past 6 months.  She has not had a colonoscopy.  She states that she has to be cleared by cardiology before this can be performed.  She was counseled at the signs and symptoms worsening along with appropriate follow-up.  She voices understanding        Final diagnoses:   RLQ abdominal pain       ED Disposition  ED Disposition     ED Disposition   Discharge    Condition   Stable    Comment   --             Yvonne Marr MD  [address]  [place] [id number]  [phone]    Schedule an appointment as soon as possible for a visit       Ruddy Del Cid MD  [address] [facility] [address]  [place] [id number]  [phone]    Schedule an appointment as soon as possible for a visit            Medication List      New Prescriptions    diclofenac 50 MG EC tablet  Commonly known as: VOLTAREN  Take 1 tablet by mouth 3 (Three) Times a Day As Needed (pain).        Stop    ibuprofen 800 MG tablet  Commonly known as: ADVIL,MOTRIN     predniSONE 10 MG tablet  Commonly known as: DELTASONE           Where to Get Your Medications      You can get these medications from any pharmacy    Bring a paper prescription for each of these  medications  · diclofenac 50 MG EC tablet          Yvon Singh PA  05/27/23 145

## 2023-05-28 LAB — BACTERIA SPEC AEROBE CULT: NO GROWTH

## 2023-06-01 LAB
BACTERIA SPEC AEROBE CULT: NORMAL
BACTERIA SPEC AEROBE CULT: NORMAL

## 2023-06-02 ENCOUNTER — TELEPHONE (OUTPATIENT)
Dept: CARDIOLOGY | Facility: CLINIC | Age: 58
End: 2023-06-02

## 2023-06-02 NOTE — TELEPHONE ENCOUNTER
"  Caller: Basilia Chamberlain \"Coco\"    Relationship: Self    Best call back number: [phone]    What is the best time to reach you: ANY     Who are you requesting to speak with (clinical staff, provider,  specific staff member): CLINICAL     What was the call regarding: PT WOULD LIKE FOR ALANA GARCIA TO TAKE A LOOK AT HER ED DISCHARGE NOTES. PT WOULD LIKE SOME CLARIFICATION ON ONE OF THE NOTES, BECAUSE HER HEART IS MENTIONED. SHE WAS ALSO SEEN BY A PROVIDER IN THE HOSPITAL THAT RECOMMENDED SHE GET A COLONOSCOPY. PT WOULD LIKE CLARIFICATION ON WHETHER OR NOT THIS CAN BE DONE BEFORE HAVING A STRESS TEST.  PLEASE CALL PT TO ADVISE FURTHER.     Is it okay if the provider responds through [employer]: YES         "

## 2023-06-05 ENCOUNTER — TELEPHONE (OUTPATIENT)
Dept: CARDIOLOGY | Facility: CLINIC | Age: 58
End: 2023-06-05
Payer: COMMERCIAL

## 2023-06-05 NOTE — TELEPHONE ENCOUNTER
Called pt and advised her of the following per Isabelle Kapadia, Review of ER records. Recommend she go ahead and see general surgery and if she needs cardiac clearance he will let us know. Her BP was elevated during visit, so she needs to monitor and if persistently elevated let us know or PCP so adjustments can be make in medications to control her BP. If she has further questions or concerns she can schedule follow up sooner.       Pt states she's unsure what the general surgion is for but is wanting to go to [place] to DR. Souza.

## 2023-06-05 NOTE — TELEPHONE ENCOUNTER
called pt and informed her she would have to see her pcp for a refferal to general surgery for colonoscopy per Isabelle Kapadia.

## 2023-08-22 ENCOUNTER — HOSPITAL ENCOUNTER (OUTPATIENT)
Dept: GENERAL RADIOLOGY | Facility: HOSPITAL | Age: 58
Discharge: HOME | End: 2023-08-22
Admitting: INTERNAL MEDICINE
Payer: COMMERCIAL

## 2023-08-22 ENCOUNTER — TRANSCRIBE ORDERS (OUTPATIENT)
Dept: ADMINISTRATIVE | Facility: HOSPITAL | Age: 58
End: 2023-08-22
Payer: COMMERCIAL

## 2023-08-22 DIAGNOSIS — J44.9 OBSTRUCTIVE CHRONIC BRONCHITIS WITHOUT EXACERBATION: ICD-10-CM

## 2023-08-22 DIAGNOSIS — J44.9 OBSTRUCTIVE CHRONIC BRONCHITIS WITHOUT EXACERBATION: Primary | ICD-10-CM

## 2023-08-22 PROCEDURE — 71046 X-RAY EXAM CHEST 2 VIEWS: CPT

## 2023-09-11 ENCOUNTER — HOSPITAL ENCOUNTER (OUTPATIENT)
Dept: CT IMAGING | Facility: HOSPITAL | Age: 58
Discharge: HOME | End: 2023-09-11
Admitting: NURSE PRACTITIONER
Payer: COMMERCIAL

## 2023-09-11 ENCOUNTER — OFFICE VISIT (OUTPATIENT)
Dept: CARDIOLOGY | Facility: CLINIC | Age: 58
End: 2023-09-11
Payer: COMMERCIAL

## 2023-09-11 VITALS
BODY MASS INDEX: 34.48 KG/M2 | OXYGEN SATURATION: 99 % | DIASTOLIC BLOOD PRESSURE: 80 MMHG | SYSTOLIC BLOOD PRESSURE: 152 MMHG | HEIGHT: 55 IN | HEART RATE: 103 BPM | WEIGHT: 149 LBS

## 2023-09-11 DIAGNOSIS — M79.605 PAIN IN BOTH LOWER EXTREMITIES: ICD-10-CM

## 2023-09-11 DIAGNOSIS — Z91.89 MULTIPLE RISK FACTORS FOR CORONARY ARTERY DISEASE: ICD-10-CM

## 2023-09-11 DIAGNOSIS — E78.5 HYPERLIPIDEMIA, UNSPECIFIED HYPERLIPIDEMIA TYPE: Primary | ICD-10-CM

## 2023-09-11 DIAGNOSIS — M79.604 PAIN IN BOTH LOWER EXTREMITIES: ICD-10-CM

## 2023-09-11 DIAGNOSIS — Z72.0 TOBACCO USE: ICD-10-CM

## 2023-09-11 DIAGNOSIS — R07.89 CHEST DISCOMFORT: ICD-10-CM

## 2023-09-11 DIAGNOSIS — R09.1 PLEURISY: ICD-10-CM

## 2023-09-11 DIAGNOSIS — I47.1 PAROXYSMAL SVT (SUPRAVENTRICULAR TACHYCARDIA): ICD-10-CM

## 2023-09-11 DIAGNOSIS — R60.0 PEDAL EDEMA: ICD-10-CM

## 2023-09-11 DIAGNOSIS — R06.02 SHORTNESS OF BREATH: ICD-10-CM

## 2023-09-11 PROCEDURE — 1160F RVW MEDS BY RX/DR IN RCRD: CPT | Performed by: NURSE PRACTITIONER

## 2023-09-11 PROCEDURE — 99214 OFFICE O/P EST MOD 30 MIN: CPT | Performed by: NURSE PRACTITIONER

## 2023-09-11 PROCEDURE — 71250 CT THORAX DX C-: CPT

## 2023-09-11 PROCEDURE — 1159F MED LIST DOCD IN RCRD: CPT | Performed by: NURSE PRACTITIONER

## 2023-09-11 RX ORDER — HYDROCHLOROTHIAZIDE 25 MG/1
12.5 TABLET ORAL DAILY
Qty: 45 TABLET | Refills: 1 | Status: SHIPPED | OUTPATIENT
Start: 2023-09-11

## 2023-09-11 RX ORDER — IBUPROFEN 800 MG/1
800 TABLET ORAL EVERY 6 HOURS PRN
COMMUNITY
Start: 2023-08-21

## 2023-09-11 RX ORDER — METOPROLOL SUCCINATE 25 MG/1
12.5 TABLET, EXTENDED RELEASE ORAL 2 TIMES DAILY
Qty: 90 TABLET | Refills: 1 | Status: SHIPPED | OUTPATIENT
Start: 2023-09-11

## 2023-09-11 RX ORDER — ESCITALOPRAM OXALATE 5 MG/1
5 TABLET ORAL DAILY
COMMUNITY
Start: 2023-08-21

## 2023-09-11 RX ORDER — PREDNISONE 20 MG/1
20 TABLET ORAL DAILY
COMMUNITY
Start: 2023-07-23

## 2023-09-11 RX ORDER — ASPIRIN 81 MG/1
81 TABLET ORAL DAILY
Qty: 90 TABLET | Refills: 1 | Status: SHIPPED | OUTPATIENT
Start: 2023-09-11

## 2023-09-11 NOTE — LETTER
September 11, 2023     Yvonne Marr MD  [address] [place] [address]    Patient: Basilia Chamberlain   YOB: 1965   Date of Visit: 9/11/2023       Dear Yvonne Marr MD    Basilia Chamberlain was in my office today. Below is a copy of my note.    If you have questions, please do not hesitate to call me. I look forward to following Basilia along with you.         Sincerely,        ALANA Christiansen        CC: No Recipients    Subjective    Basilia Chamberlain is a 57 y.o. female.   Chief Complaint   Patient presents with   • Hyperlipidemia     Follow up    • Med Refill     Refills pended      History of Present Illness   Basilia Chamberlain is a 57-year-old female who presents to clinic today for cardiology follow-up.  She is accompanied by her .    Hyperlipidemia currently on Lipitor 20 mg daily.  She does admit to some missed doses.  Increased dose has been recommended but she was unable to tolerate secondary to side effects.  No labs available for review however she reports recent labs through PCP.  She is tries to eat healthier.     Paroxysmal SVT currently on Toprol XL 12.5 mg daily.  She continues to intermittently experience palpitations and tachycardia.  It has been recommended in the past that her medications be increased however she reports that she has had to decrease to every other day due to hypotension.  She denies worsening palpitations, worsening tachycardia, bradycardia, dizziness or syncope.      Lower extremity swelling stable on HCTZ 12.5 mg daily.  She reports due to low BP she is taking it now every other day.  She reports her primary does not feel she has swelling feels that she has poor circulation.  She has had venous imaging in the past with no evidence of DVT.  She reports that HCTZ does help.     She continues to experience intermittent chest discomfort, she denies worsening of symptoms.  Stress testing has been arranged several times  but she has not been able to keep her appointment.  We have discussed alternative options to include CT which she is willing and agreeable to try.  She is taking aspirin, statin and beta-blocker.  She denies underlying history of DM.  She is a smoker.    Patient Active Problem List   Diagnosis   • Abnormal mammogram   • Breast mass   • Breast cyst   • Centrilobular emphysema   • Encounter for screening for malignant neoplasm of colon   • Hyperlipidemia   • Paroxysmal SVT (supraventricular tachycardia)   • Pericardial effusion   • Pedal edema     Past Medical History:   Diagnosis Date   • Anxiety 06/25/2018   • Arthritis    • COPD (chronic obstructive pulmonary disease)    • Elevated cholesterol    • Fibromyalgia    • Migraines    • RSD (reflex sympathetic dystrophy)      No past surgical history on file.    Family History   Problem Relation Age of Onset   • Hypertension Mother    • Hypertension Father    • Heart disease Sister    • Diabetes Sister    • Breast cancer Neg Hx      Social History     Tobacco Use   • Smoking status: Every Day     Packs/day: 0.50     Types: Cigarettes   • Smokeless tobacco: Never   • Tobacco comments:     smoked about 2 ppd for about 15 years. recently cut down to 1/2 ppd.   Vaping Use   • Vaping Use: Never used   Substance Use Topics   • Alcohol use: No   • Drug use: No     The following portions of the patient's history were reviewed and updated as appropriate: allergies, current medications, past family history, past medical history, past social history, past surgical history and problem list.    Allergies   Allergen Reactions   • Floxin [Ofloxacin]        Current Outpatient Medications:   •  acetaminophen (TYLENOL) 500 MG tablet, Take 1 tablet by mouth Every 6 (Six) Hours As Needed for Mild Pain., Disp: , Rfl:   •  albuterol sulfate  (90 Base) MCG/ACT inhaler, Inhale 2 puffs Every 4 (Four) Hours As Needed for Wheezing., Disp: 18 g, Rfl: 5  •  Allergy Relief 10 MG tablet, ,  Disp: , Rfl:   •  ALPRAZolam (XANAX) 2 MG tablet, Take 1 tablet by mouth., Disp: , Rfl:   •  amitriptyline (ELAVIL) 25 MG tablet, , Disp: , Rfl:   •  aspirin 81 MG EC tablet, Take 1 tablet by mouth Daily., Disp: 90 tablet, Rfl: 1  •  atorvastatin (LIPITOR) 20 MG tablet, Take 1 tablet by mouth Daily., Disp: 90 tablet, Rfl: 1  •  escitalopram (LEXAPRO) 5 MG tablet, Take 1 tablet by mouth Daily., Disp: , Rfl:   •  fluticasone (FLOVENT HFA) 220 MCG/ACT inhaler, Inhale 2 puffs 2 (Two) Times a Day., Disp: 12 g, Rfl: 5  •  hydroCHLOROthiazide (HYDRODIURIL) 25 MG tablet, Take 0.5 tablets by mouth Daily., Disp: 45 tablet, Rfl: 1  •  ibuprofen (ADVIL,MOTRIN) 800 MG tablet, Take 1 tablet by mouth Every 6 (Six) Hours As Needed., Disp: , Rfl:   •  ipratropium-albuterol (DUO-NEB) 0.5-2.5 mg/3 ml nebulizer, Take 3 mL by nebulization 4 (Four) Times a Day As Needed for Wheezing or Shortness of Air., Disp: 120 mL, Rfl: 5  •  methocarbamol (ROBAXIN) 750 MG tablet, Take 1 tablet by mouth 4 (Four) Times a Day As Needed for Muscle Spasms., Disp: , Rfl:   •  metoprolol succinate XL (TOPROL-XL) 25 MG 24 hr tablet, Take 0.5 tablets by mouth 2 (Two) Times a Day., Disp: 90 tablet, Rfl: 1  •  O2 (OXYGEN), Inhale 2 L/min Every Night., Disp: , Rfl:   •  predniSONE (DELTASONE) 20 MG tablet, Take 1 tablet by mouth Daily., Disp: , Rfl:   •  Stiolto Respimat 2.5-2.5 MCG/ACT aerosol solution inhaler, Inhale 2 puffs Daily., Disp: 4 g, Rfl: 5  •  vitamin D (ERGOCALCIFEROL) 1.25 MG (49158 UT) capsule capsule, Take 1 capsule by mouth 1 (One) Time Per Week., Disp: , Rfl:     Review of Systems   Constitutional:  Negative for activity change, appetite change, chills, diaphoresis, fatigue and fever.   HENT:  Negative for congestion, drooling, ear discharge, ear pain, mouth sores, nosebleeds, postnasal drip, rhinorrhea, sinus pressure, sneezing and sore throat.    Eyes:  Negative for pain, discharge and visual disturbance.   Respiratory:  Positive for chest  "tightness. Negative for cough, shortness of breath and wheezing.    Cardiovascular:  Positive for palpitations. Negative for chest pain and leg swelling.   Gastrointestinal:  Negative for abdominal pain, constipation, diarrhea, nausea and vomiting.   Endocrine: Negative for cold intolerance, heat intolerance, polydipsia, polyphagia and polyuria.   Musculoskeletal:  Negative for arthralgias, myalgias and neck pain.   Skin:  Negative for rash and wound.   Neurological:  Negative for dizziness, syncope, speech difficulty, weakness, light-headedness and headaches.   Hematological:  Negative for adenopathy. Does not bruise/bleed easily.   Psychiatric/Behavioral:  Negative for confusion, dysphoric mood and sleep disturbance. The patient is not nervous/anxious.    All other systems reviewed and are negative.    /80 (BP Location: Right arm, Patient Position: Sitting, Cuff Size: Adult)   Pulse 103   Ht 65 cm (25.59\")   Wt 67.6 kg (149 lb)   LMP  (LMP Unknown)   SpO2 99%   .96 kg/m²     Objective  Allergies   Allergen Reactions   • Floxin [Ofloxacin]        Physical Exam  Vitals reviewed.   Constitutional:       Appearance: Normal appearance. She is well-developed.   HENT:      Head: Normocephalic.      Right Ear: Tympanic membrane normal.      Left Ear: Tympanic membrane normal.      Nose: Nose normal.   Eyes:      Conjunctiva/sclera: Conjunctivae normal.      Pupils: Pupils are equal, round, and reactive to light.   Neck:      Thyroid: No thyromegaly.      Vascular: No carotid bruit or JVD.   Cardiovascular:      Rate and Rhythm: Normal rate and regular rhythm.   Pulmonary:      Effort: Pulmonary effort is normal.      Breath sounds: Normal breath sounds.   Abdominal:      General: Bowel sounds are normal.      Palpations: Abdomen is soft. There is no hepatomegaly, splenomegaly or mass.      Tenderness: There is no abdominal tenderness.   Musculoskeletal:         General: Normal range of motion.      " Cervical back: Neck supple.      Right lower leg: No edema.      Left lower leg: No edema.   Skin:     General: Skin is warm and dry.   Neurological:      Mental Status: She is alert and oriented to person, place, and time.   Psychiatric:         Attention and Perception: Attention normal.         Mood and Affect: Mood normal.         Speech: Speech normal.         Behavior: Behavior normal. Behavior is cooperative.         Cognition and Memory: Cognition normal.           LABS  WBC   Date Value Ref Range Status   05/27/2023 10.57 3.40 - 10.80 10*3/mm3 Final     RBC   Date Value Ref Range Status   05/27/2023 4.36 3.77 - 5.28 10*6/mm3 Final     Hemoglobin   Date Value Ref Range Status   05/27/2023 15.6 12.0 - 15.9 g/dL Final     Hematocrit   Date Value Ref Range Status   05/27/2023 46.0 34.0 - 46.6 % Final     MCV   Date Value Ref Range Status   05/27/2023 105.5 (H) 79.0 - 97.0 fL Final     MCH   Date Value Ref Range Status   05/27/2023 35.8 (H) 26.6 - 33.0 pg Final     MCHC   Date Value Ref Range Status   05/27/2023 33.9 31.5 - 35.7 g/dL Final     RDW   Date Value Ref Range Status   05/27/2023 12.5 12.3 - 15.4 % Final     RDW-SD   Date Value Ref Range Status   05/27/2023 48.8 37.0 - 54.0 fl Final     MPV   Date Value Ref Range Status   05/27/2023 9.0 6.0 - 12.0 fL Final     Platelets   Date Value Ref Range Status   05/27/2023 354 140 - 450 10*3/mm3 Final     Neutrophil %   Date Value Ref Range Status   05/27/2023 61.5 42.7 - 76.0 % Final     Lymphocyte %   Date Value Ref Range Status   05/27/2023 31.9 19.6 - 45.3 % Final     Monocyte %   Date Value Ref Range Status   05/27/2023 4.4 (L) 5.0 - 12.0 % Final     Eosinophil %   Date Value Ref Range Status   05/27/2023 0.8 0.3 - 6.2 % Final     Basophil %   Date Value Ref Range Status   05/27/2023 0.9 0.0 - 1.5 % Final     Immature Grans %   Date Value Ref Range Status   05/27/2023 0.5 0.0 - 0.5 % Final     Neutrophils, Absolute   Date Value Ref Range Status   05/27/2023  6.51 1.70 - 7.00 10*3/mm3 Final     Lymphocytes, Absolute   Date Value Ref Range Status   05/27/2023 3.37 (H) 0.70 - 3.10 10*3/mm3 Final     Monocytes, Absolute   Date Value Ref Range Status   05/27/2023 0.47 0.10 - 0.90 10*3/mm3 Final     Eosinophils, Absolute   Date Value Ref Range Status   05/27/2023 0.08 0.00 - 0.40 10*3/mm3 Final     Basophils, Absolute   Date Value Ref Range Status   05/27/2023 0.09 0.00 - 0.20 10*3/mm3 Final     Immature Grans, Absolute   Date Value Ref Range Status   05/27/2023 0.05 0.00 - 0.05 10*3/mm3 Final     nRBC   Date Value Ref Range Status   05/27/2023 0.0 0.0 - 0.2 /100 WBC Final       Total Cholesterol   Date Value Ref Range Status   09/29/2022 178 0 - 200 mg/dL Final     Triglycerides   Date Value Ref Range Status   09/29/2022 69 0 - 150 mg/dL Final     HDL Cholesterol   Date Value Ref Range Status   09/29/2022 81 (H) 40 - 60 mg/dL Final     LDL Cholesterol    Date Value Ref Range Status   09/29/2022 84 0 - 100 mg/dL Final     IMAGING  XR Chest 2 View    Result Date: 8/22/2023    Coarsened interstitial markings noted throughout the lungs.  This report was finalized on 8/22/2023 12:48 PM by Dr. Adam Hall MD.      CT Abdomen Pelvis With Contrast    Result Date: 5/27/2023  Impression: 1. No acute process.  This report was finalized on 5/27/2023 11:10 AM by Kapil Nicholson DO.              Assessment & Plan  Diagnoses and all orders for this visit:    1. Hyperlipidemia, unspecified hyperlipidemia type (Primary)  Continue on statin, LDL goal < 70, heart healthy diet, request labs at PCP    2. Paroxysmal SVT (supraventricular tachycardia)  Continue home metoprolol, have recommended increasing however patient reports BP is unable to recommend avoidance of caffeine/stimulants.    3. Pedal edema  Stable on HCTZ as needed, sodium restrictions recommended, compression socks recommended.  We will arrange for arterial ultrasound.  Reviewed negative venous Doppler imaging.    4. Chest  discomfort  Recommend a nuclear stress test however she has been unable to have that performed.  We have discussed alternative options including a CT, she is willing to proceed.  Continue on aspirin statin and beta-blocker.  Advised if new or worsening symptoms while awaiting work-up go to the ER.  She expresses understanding.  EKG recommended, due to another appointment today will defer for next visit.    5. Tobacco use  Recommend avoidance of tobacco products    6. Multiple risk factors for coronary artery disease  Further evaluation has been recommended and will be arranged    Other orders  -     aspirin 81 MG EC tablet; Take 1 tablet by mouth Daily.  Dispense: 90 tablet; Refill: 1  -     hydroCHLOROthiazide (HYDRODIURIL) 25 MG tablet; Take 0.5 tablets by mouth Daily.  Dispense: 45 tablet; Refill: 1  -     metoprolol succinate XL (TOPROL-XL) 25 MG 24 hr tablet; Take 0.5 tablets by mouth 2 (Two) Times a Day.  Dispense: 90 tablet; Refill: 1      Review of medical record  Discussed ER visit for Monday and medical record    Lifestyle modifications including heart healthy diet, regular exercise, maintenance of desirable body weight and avoidance of tobacco products    Follow-up in 4 months with EKG, sooner if needed.

## 2023-09-11 NOTE — PROGRESS NOTES
Subjective     Basilia Chamberlain is a 57 y.o. female.   Chief Complaint   Patient presents with    Hyperlipidemia     Follow up     Med Refill     Refills pended      History of Present Illness   Basilia Chamberlain is a 57-year-old female who presents to clinic today for cardiology follow-up.  She is accompanied by her .    Hyperlipidemia currently on Lipitor 20 mg daily.  She does admit to some missed doses.  Increased dose has been recommended but she was unable to tolerate secondary to side effects.  No labs available for review however she reports recent labs through PCP.  She is tries to eat healthier.     Paroxysmal SVT currently on Toprol XL 12.5 mg daily.  She continues to intermittently experience palpitations and tachycardia.  It has been recommended in the past that her medications be increased however she reports that she has had to decrease to every other day due to hypotension.  She denies worsening palpitations, worsening tachycardia, bradycardia, dizziness or syncope.      Lower extremity swelling stable on HCTZ 12.5 mg daily.  She reports due to low BP she is taking it now every other day.  She reports her primary does not feel she has swelling feels that she has poor circulation.  She has had venous imaging in the past with no evidence of DVT.  She reports that HCTZ does help.     She continues to experience intermittent chest discomfort, she denies worsening of symptoms.  Stress testing has been arranged several times but she has not been able to keep her appointment.  We have discussed alternative options to include CT which she is willing and agreeable to try.  She is taking aspirin, statin and beta-blocker.  She denies underlying history of DM.  She is a smoker.    Patient Active Problem List   Diagnosis    Abnormal mammogram    Breast mass    Breast cyst    Centrilobular emphysema    Encounter for screening for malignant neoplasm of colon    Hyperlipidemia    Paroxysmal SVT  (supraventricular tachycardia)    Pericardial effusion    Pedal edema     Past Medical History:   Diagnosis Date    Anxiety 06/25/2018    Arthritis     COPD (chronic obstructive pulmonary disease)     Elevated cholesterol     Fibromyalgia     Migraines     RSD (reflex sympathetic dystrophy)      No past surgical history on file.    Family History   Problem Relation Age of Onset    Hypertension Mother     Hypertension Father     Heart disease Sister     Diabetes Sister     Breast cancer Neg Hx      Social History     Tobacco Use    Smoking status: Every Day     Packs/day: 0.50     Types: Cigarettes    Smokeless tobacco: Never    Tobacco comments:     smoked about 2 ppd for about 15 years. recently cut down to 1/2 ppd.   Vaping Use    Vaping Use: Never used   Substance Use Topics    Alcohol use: No    Drug use: No     The following portions of the patient's history were reviewed and updated as appropriate: allergies, current medications, past family history, past medical history, past social history, past surgical history and problem list.    Allergies   Allergen Reactions    Floxin [Ofloxacin]        Current Outpatient Medications:     acetaminophen (TYLENOL) 500 MG tablet, Take 1 tablet by mouth Every 6 (Six) Hours As Needed for Mild Pain., Disp: , Rfl:     albuterol sulfate  (90 Base) MCG/ACT inhaler, Inhale 2 puffs Every 4 (Four) Hours As Needed for Wheezing., Disp: 18 g, Rfl: 5    Allergy Relief 10 MG tablet, , Disp: , Rfl:     ALPRAZolam (XANAX) 2 MG tablet, Take 1 tablet by mouth., Disp: , Rfl:     amitriptyline (ELAVIL) 25 MG tablet, , Disp: , Rfl:     aspirin 81 MG EC tablet, Take 1 tablet by mouth Daily., Disp: 90 tablet, Rfl: 1    atorvastatin (LIPITOR) 20 MG tablet, Take 1 tablet by mouth Daily., Disp: 90 tablet, Rfl: 1    escitalopram (LEXAPRO) 5 MG tablet, Take 1 tablet by mouth Daily., Disp: , Rfl:     fluticasone (FLOVENT HFA) 220 MCG/ACT inhaler, Inhale 2 puffs 2 (Two) Times a Day., Disp: 12 g,  Rfl: 5    hydroCHLOROthiazide (HYDRODIURIL) 25 MG tablet, Take 0.5 tablets by mouth Daily., Disp: 45 tablet, Rfl: 1    ibuprofen (ADVIL,MOTRIN) 800 MG tablet, Take 1 tablet by mouth Every 6 (Six) Hours As Needed., Disp: , Rfl:     ipratropium-albuterol (DUO-NEB) 0.5-2.5 mg/3 ml nebulizer, Take 3 mL by nebulization 4 (Four) Times a Day As Needed for Wheezing or Shortness of Air., Disp: 120 mL, Rfl: 5    methocarbamol (ROBAXIN) 750 MG tablet, Take 1 tablet by mouth 4 (Four) Times a Day As Needed for Muscle Spasms., Disp: , Rfl:     metoprolol succinate XL (TOPROL-XL) 25 MG 24 hr tablet, Take 0.5 tablets by mouth 2 (Two) Times a Day., Disp: 90 tablet, Rfl: 1    O2 (OXYGEN), Inhale 2 L/min Every Night., Disp: , Rfl:     predniSONE (DELTASONE) 20 MG tablet, Take 1 tablet by mouth Daily., Disp: , Rfl:     Stiolto Respimat 2.5-2.5 MCG/ACT aerosol solution inhaler, Inhale 2 puffs Daily., Disp: 4 g, Rfl: 5    vitamin D (ERGOCALCIFEROL) 1.25 MG (34155 UT) capsule capsule, Take 1 capsule by mouth 1 (One) Time Per Week., Disp: , Rfl:     Review of Systems   Constitutional:  Negative for activity change, appetite change, chills, diaphoresis, fatigue and fever.   HENT:  Negative for congestion, drooling, ear discharge, ear pain, mouth sores, nosebleeds, postnasal drip, rhinorrhea, sinus pressure, sneezing and sore throat.    Eyes:  Negative for pain, discharge and visual disturbance.   Respiratory:  Positive for chest tightness. Negative for cough, shortness of breath and wheezing.    Cardiovascular:  Positive for palpitations. Negative for chest pain and leg swelling.   Gastrointestinal:  Negative for abdominal pain, constipation, diarrhea, nausea and vomiting.   Endocrine: Negative for cold intolerance, heat intolerance, polydipsia, polyphagia and polyuria.   Musculoskeletal:  Negative for arthralgias, myalgias and neck pain.   Skin:  Negative for rash and wound.   Neurological:  Negative for dizziness, syncope, speech  "difficulty, weakness, light-headedness and headaches.   Hematological:  Negative for adenopathy. Does not bruise/bleed easily.   Psychiatric/Behavioral:  Negative for confusion, dysphoric mood and sleep disturbance. The patient is not nervous/anxious.    All other systems reviewed and are negative.    /80 (BP Location: Right arm, Patient Position: Sitting, Cuff Size: Adult)   Pulse 103   Ht 65 cm (25.59\")   Wt 67.6 kg (149 lb)   LMP  (LMP Unknown)   SpO2 99%   .96 kg/m²     Objective   Allergies   Allergen Reactions    Floxin [Ofloxacin]        Physical Exam  Vitals reviewed.   Constitutional:       Appearance: Normal appearance. She is well-developed.   HENT:      Head: Normocephalic.      Right Ear: Tympanic membrane normal.      Left Ear: Tympanic membrane normal.      Nose: Nose normal.   Eyes:      Conjunctiva/sclera: Conjunctivae normal.      Pupils: Pupils are equal, round, and reactive to light.   Neck:      Thyroid: No thyromegaly.      Vascular: No carotid bruit or JVD.   Cardiovascular:      Rate and Rhythm: Normal rate and regular rhythm.   Pulmonary:      Effort: Pulmonary effort is normal.      Breath sounds: Normal breath sounds.   Abdominal:      General: Bowel sounds are normal.      Palpations: Abdomen is soft. There is no hepatomegaly, splenomegaly or mass.      Tenderness: There is no abdominal tenderness.   Musculoskeletal:         General: Normal range of motion.      Cervical back: Neck supple.      Right lower leg: No edema.      Left lower leg: No edema.   Skin:     General: Skin is warm and dry.   Neurological:      Mental Status: She is alert and oriented to person, place, and time.   Psychiatric:         Attention and Perception: Attention normal.         Mood and Affect: Mood normal.         Speech: Speech normal.         Behavior: Behavior normal. Behavior is cooperative.         Cognition and Memory: Cognition normal.           LABS  WBC   Date Value Ref Range Status "   05/27/2023 10.57 3.40 - 10.80 10*3/mm3 Final     RBC   Date Value Ref Range Status   05/27/2023 4.36 3.77 - 5.28 10*6/mm3 Final     Hemoglobin   Date Value Ref Range Status   05/27/2023 15.6 12.0 - 15.9 g/dL Final     Hematocrit   Date Value Ref Range Status   05/27/2023 46.0 34.0 - 46.6 % Final     MCV   Date Value Ref Range Status   05/27/2023 105.5 (H) 79.0 - 97.0 fL Final     MCH   Date Value Ref Range Status   05/27/2023 35.8 (H) 26.6 - 33.0 pg Final     MCHC   Date Value Ref Range Status   05/27/2023 33.9 31.5 - 35.7 g/dL Final     RDW   Date Value Ref Range Status   05/27/2023 12.5 12.3 - 15.4 % Final     RDW-SD   Date Value Ref Range Status   05/27/2023 48.8 37.0 - 54.0 fl Final     MPV   Date Value Ref Range Status   05/27/2023 9.0 6.0 - 12.0 fL Final     Platelets   Date Value Ref Range Status   05/27/2023 354 140 - 450 10*3/mm3 Final     Neutrophil %   Date Value Ref Range Status   05/27/2023 61.5 42.7 - 76.0 % Final     Lymphocyte %   Date Value Ref Range Status   05/27/2023 31.9 19.6 - 45.3 % Final     Monocyte %   Date Value Ref Range Status   05/27/2023 4.4 (L) 5.0 - 12.0 % Final     Eosinophil %   Date Value Ref Range Status   05/27/2023 0.8 0.3 - 6.2 % Final     Basophil %   Date Value Ref Range Status   05/27/2023 0.9 0.0 - 1.5 % Final     Immature Grans %   Date Value Ref Range Status   05/27/2023 0.5 0.0 - 0.5 % Final     Neutrophils, Absolute   Date Value Ref Range Status   05/27/2023 6.51 1.70 - 7.00 10*3/mm3 Final     Lymphocytes, Absolute   Date Value Ref Range Status   05/27/2023 3.37 (H) 0.70 - 3.10 10*3/mm3 Final     Monocytes, Absolute   Date Value Ref Range Status   05/27/2023 0.47 0.10 - 0.90 10*3/mm3 Final     Eosinophils, Absolute   Date Value Ref Range Status   05/27/2023 0.08 0.00 - 0.40 10*3/mm3 Final     Basophils, Absolute   Date Value Ref Range Status   05/27/2023 0.09 0.00 - 0.20 10*3/mm3 Final     Immature Grans, Absolute   Date Value Ref Range Status   05/27/2023 0.05 0.00  - 0.05 10*3/mm3 Final     nRBC   Date Value Ref Range Status   05/27/2023 0.0 0.0 - 0.2 /100 WBC Final       Total Cholesterol   Date Value Ref Range Status   09/29/2022 178 0 - 200 mg/dL Final     Triglycerides   Date Value Ref Range Status   09/29/2022 69 0 - 150 mg/dL Final     HDL Cholesterol   Date Value Ref Range Status   09/29/2022 81 (H) 40 - 60 mg/dL Final     LDL Cholesterol    Date Value Ref Range Status   09/29/2022 84 0 - 100 mg/dL Final     IMAGING  XR Chest 2 View    Result Date: 8/22/2023    Coarsened interstitial markings noted throughout the lungs.  This report was finalized on 8/22/2023 12:48 PM by Dr. Adam Hall MD.      CT Abdomen Pelvis With Contrast    Result Date: 5/27/2023  Impression: 1. No acute process.  This report was finalized on 5/27/2023 11:10 AM by Kapil Nicholson DO.              Assessment & Plan   Diagnoses and all orders for this visit:    1. Hyperlipidemia, unspecified hyperlipidemia type (Primary)  Continue on statin, LDL goal < 70, heart healthy diet, request labs at PCP    2. Paroxysmal SVT (supraventricular tachycardia)  Continue home metoprolol, have recommended increasing however patient reports BP is unable to recommend avoidance of caffeine/stimulants.    3. Pedal edema  Stable on HCTZ as needed, sodium restrictions recommended, compression socks recommended.  We will arrange for arterial ultrasound.  Reviewed negative venous Doppler imaging.    4. Chest discomfort  Recommend a nuclear stress test however she has been unable to have that performed.  We have discussed alternative options including a CT, she is willing to proceed.  Continue on aspirin statin and beta-blocker.  Advised if new or worsening symptoms while awaiting work-up go to the ER.  She expresses understanding.  EKG recommended, due to another appointment today will defer for next visit.    5. Tobacco use  Recommend avoidance of tobacco products    6. Multiple risk factors for coronary artery  disease  Further evaluation has been recommended and will be arranged    Other orders  -     aspirin 81 MG EC tablet; Take 1 tablet by mouth Daily.  Dispense: 90 tablet; Refill: 1  -     hydroCHLOROthiazide (HYDRODIURIL) 25 MG tablet; Take 0.5 tablets by mouth Daily.  Dispense: 45 tablet; Refill: 1  -     metoprolol succinate XL (TOPROL-XL) 25 MG 24 hr tablet; Take 0.5 tablets by mouth 2 (Two) Times a Day.  Dispense: 90 tablet; Refill: 1      Review of medical record  Discussed ER visit for Monday and medical record    Lifestyle modifications including heart healthy diet, regular exercise, maintenance of desirable body weight and avoidance of tobacco products    Follow-up in 4 months with EKG, sooner if needed.

## 2023-09-12 ENCOUNTER — TELEPHONE (OUTPATIENT)
Dept: PULMONOLOGY | Facility: CLINIC | Age: 58
End: 2023-09-12
Payer: COMMERCIAL

## 2023-09-12 DIAGNOSIS — E78.5 HYPERLIPIDEMIA, UNSPECIFIED HYPERLIPIDEMIA TYPE: Primary | ICD-10-CM

## 2023-09-13 ENCOUNTER — TELEPHONE (OUTPATIENT)
Dept: CARDIOLOGY | Facility: CLINIC | Age: 58
End: 2023-09-13
Payer: COMMERCIAL

## 2023-09-13 NOTE — TELEPHONE ENCOUNTER
----- Message from ALANA Urbano sent at 9/12/2023  8:57 PM EDT -----  Received and reviewed labs from pcp. Complete cholesterol panel not available but LDL remains high, recommend increasing lipitor to 40 mg and recheck cmp, ck and flp prior to follow up.

## 2023-09-13 NOTE — TELEPHONE ENCOUNTER
Called pt regarding the following per Isabelle Bunn APRN   Received and reviewed labs from pcp. Complete cholesterol panel not available but LDL remains high, recommend increasing lipitor to 40 mg and recheck cmp, ck and flp prior to follow up.

## 2023-09-21 ENCOUNTER — HOSPITAL ENCOUNTER (OUTPATIENT)
Dept: CARDIOLOGY | Facility: HOSPITAL | Age: 58
Discharge: HOME | End: 2023-09-21
Payer: COMMERCIAL

## 2023-09-21 DIAGNOSIS — M79.605 PAIN IN BOTH LOWER EXTREMITIES: ICD-10-CM

## 2023-09-21 DIAGNOSIS — M79.604 PAIN IN BOTH LOWER EXTREMITIES: ICD-10-CM

## 2023-09-21 PROCEDURE — 93925 LOWER EXTREMITY STUDY: CPT

## 2023-10-25 ENCOUNTER — TRANSCRIBE ORDERS (OUTPATIENT)
Dept: ADMINISTRATIVE | Facility: HOSPITAL | Age: 58
End: 2023-10-25
Payer: COMMERCIAL

## 2023-10-25 DIAGNOSIS — Z12.31 VISIT FOR SCREENING MAMMOGRAM: Primary | ICD-10-CM

## 2023-12-19 ENCOUNTER — TELEPHONE (OUTPATIENT)
Dept: INFUSION THERAPY | Facility: HOSPITAL | Age: 58
End: 2023-12-19
Payer: COMMERCIAL

## 2023-12-19 NOTE — TELEPHONE ENCOUNTER
Attempted to contact patient as pre-procedure phone call prior to planned CT angiogram coronary planned for (). Left voicemail message reminder with arrival time between () to main registration, nothing to eat or drink 2 hours prior to arrival time, no caffeine after midnight, okay to take medications as per normal routine on Monday unless taking a stimulant,  is recommended, and if have any questions may contact outpatient prep and recovery at [phone].

## 2024-02-01 ENCOUNTER — HOSPITAL ENCOUNTER (EMERGENCY)
Facility: HOSPITAL | Age: 59
Discharge: HOME | End: 2024-02-01
Attending: EMERGENCY MEDICINE | Admitting: EMERGENCY MEDICINE
Payer: COMMERCIAL

## 2024-02-01 VITALS
OXYGEN SATURATION: 99 % | BODY MASS INDEX: 23.16 KG/M2 | RESPIRATION RATE: 17 BRPM | TEMPERATURE: 98.8 F | HEIGHT: 65 IN | SYSTOLIC BLOOD PRESSURE: 150 MMHG | DIASTOLIC BLOOD PRESSURE: 74 MMHG | HEART RATE: 118 BPM | WEIGHT: 139 LBS

## 2024-02-01 DIAGNOSIS — H60.501 ACUTE OTITIS EXTERNA OF RIGHT EAR, UNSPECIFIED TYPE: Primary | ICD-10-CM

## 2024-02-01 DIAGNOSIS — N39.0 UTI (URINARY TRACT INFECTION), UNCOMPLICATED: ICD-10-CM

## 2024-02-01 DIAGNOSIS — B37.31 VAGINAL CANDIDIASIS: ICD-10-CM

## 2024-02-01 LAB
ALBUMIN SERPL-MCNC: 4.4 G/DL (ref 3.5–5.2)
ALBUMIN/GLOB SERPL: 1.5 G/DL
ALP SERPL-CCNC: 103 U/L (ref 39–117)
ALT SERPL W P-5'-P-CCNC: 23 U/L (ref 1–33)
ANION GAP SERPL CALCULATED.3IONS-SCNC: 12 MMOL/L (ref 5–15)
AST SERPL-CCNC: 19 U/L (ref 1–32)
BACTERIA UR QL AUTO: ABNORMAL /HPF
BASOPHILS # BLD AUTO: 0.08 10*3/MM3 (ref 0–0.2)
BASOPHILS NFR BLD AUTO: 0.7 % (ref 0–1.5)
BILIRUB SERPL-MCNC: 0.3 MG/DL (ref 0–1.2)
BILIRUB UR QL STRIP: NEGATIVE
BUN SERPL-MCNC: 5 MG/DL (ref 6–20)
BUN/CREAT SERPL: 8.1 (ref 7–25)
CALCIUM SPEC-SCNC: 9.3 MG/DL (ref 8.6–10.5)
CHLORIDE SERPL-SCNC: 103 MMOL/L (ref 98–107)
CLARITY UR: ABNORMAL
CO2 SERPL-SCNC: 24 MMOL/L (ref 22–29)
COLOR UR: YELLOW
CREAT SERPL-MCNC: 0.62 MG/DL (ref 0.57–1)
CRP SERPL-MCNC: 1.13 MG/DL (ref 0–0.5)
D-LACTATE SERPL-SCNC: 0.9 MMOL/L (ref 0.5–2)
DEPRECATED RDW RBC AUTO: 48.2 FL (ref 37–54)
EGFRCR SERPLBLD CKD-EPI 2021: 104.7 ML/MIN/1.73
EOSINOPHIL # BLD AUTO: 0.03 10*3/MM3 (ref 0–0.4)
EOSINOPHIL NFR BLD AUTO: 0.3 % (ref 0.3–6.2)
ERYTHROCYTE [DISTWIDTH] IN BLOOD BY AUTOMATED COUNT: 12.3 % (ref 12.3–15.4)
GLOBULIN UR ELPH-MCNC: 3 GM/DL
GLUCOSE SERPL-MCNC: 115 MG/DL (ref 65–99)
GLUCOSE UR STRIP-MCNC: NEGATIVE MG/DL
HCT VFR BLD AUTO: 43.8 % (ref 34–46.6)
HGB BLD-MCNC: 14.5 G/DL (ref 12–15.9)
HGB UR QL STRIP.AUTO: ABNORMAL
HOLD SPECIMEN: NORMAL
HYALINE CASTS UR QL AUTO: ABNORMAL /LPF
IMM GRANULOCYTES # BLD AUTO: 0.05 10*3/MM3 (ref 0–0.05)
IMM GRANULOCYTES NFR BLD AUTO: 0.4 % (ref 0–0.5)
KETONES UR QL STRIP: NEGATIVE
LEUKOCYTE ESTERASE UR QL STRIP.AUTO: ABNORMAL
LYMPHOCYTES # BLD AUTO: 2.34 10*3/MM3 (ref 0.7–3.1)
LYMPHOCYTES NFR BLD AUTO: 20.4 % (ref 19.6–45.3)
MCH RBC QN AUTO: 34.8 PG (ref 26.6–33)
MCHC RBC AUTO-ENTMCNC: 33.1 G/DL (ref 31.5–35.7)
MCV RBC AUTO: 105 FL (ref 79–97)
MONOCYTES # BLD AUTO: 0.82 10*3/MM3 (ref 0.1–0.9)
MONOCYTES NFR BLD AUTO: 7.1 % (ref 5–12)
NEUTROPHILS NFR BLD AUTO: 71.1 % (ref 42.7–76)
NEUTROPHILS NFR BLD AUTO: 8.16 10*3/MM3 (ref 1.7–7)
NITRITE UR QL STRIP: NEGATIVE
NRBC BLD AUTO-RTO: 0 /100 WBC (ref 0–0.2)
PH UR STRIP.AUTO: 6 [PH] (ref 5–8)
PLATELET # BLD AUTO: 275 10*3/MM3 (ref 140–450)
PMV BLD AUTO: 9.2 FL (ref 6–12)
POTASSIUM SERPL-SCNC: 4.1 MMOL/L (ref 3.5–5.2)
PROCALCITONIN SERPL-MCNC: 0.05 NG/ML (ref 0–0.25)
PROT SERPL-MCNC: 7.4 G/DL (ref 6–8.5)
PROT UR QL STRIP: ABNORMAL
RBC # BLD AUTO: 4.17 10*6/MM3 (ref 3.77–5.28)
RBC # UR STRIP: ABNORMAL /HPF
REF LAB TEST METHOD: ABNORMAL
SODIUM SERPL-SCNC: 139 MMOL/L (ref 136–145)
SP GR UR STRIP: 1.01 (ref 1–1.03)
SQUAMOUS #/AREA URNS HPF: ABNORMAL /HPF
UROBILINOGEN UR QL STRIP: ABNORMAL
WBC # UR STRIP: ABNORMAL /HPF
WBC NRBC COR # BLD AUTO: 11.48 10*3/MM3 (ref 3.4–10.8)
WHOLE BLOOD HOLD COAG: NORMAL
WHOLE BLOOD HOLD SPECIMEN: NORMAL

## 2024-02-01 PROCEDURE — 87040 BLOOD CULTURE FOR BACTERIA: CPT | Performed by: PHYSICIAN ASSISTANT

## 2024-02-01 PROCEDURE — 87086 URINE CULTURE/COLONY COUNT: CPT | Performed by: PHYSICIAN ASSISTANT

## 2024-02-01 PROCEDURE — 81001 URINALYSIS AUTO W/SCOPE: CPT | Performed by: PHYSICIAN ASSISTANT

## 2024-02-01 PROCEDURE — 96372 THER/PROPH/DIAG INJ SC/IM: CPT

## 2024-02-01 PROCEDURE — 83605 ASSAY OF LACTIC ACID: CPT | Performed by: PHYSICIAN ASSISTANT

## 2024-02-01 PROCEDURE — 86140 C-REACTIVE PROTEIN: CPT | Performed by: PHYSICIAN ASSISTANT

## 2024-02-01 PROCEDURE — 99283 EMERGENCY DEPT VISIT LOW MDM: CPT

## 2024-02-01 PROCEDURE — 80053 COMPREHEN METABOLIC PANEL: CPT | Performed by: PHYSICIAN ASSISTANT

## 2024-02-01 PROCEDURE — 85025 COMPLETE CBC W/AUTO DIFF WBC: CPT | Performed by: PHYSICIAN ASSISTANT

## 2024-02-01 PROCEDURE — 36415 COLL VENOUS BLD VENIPUNCTURE: CPT

## 2024-02-01 PROCEDURE — 84145 PROCALCITONIN (PCT): CPT | Performed by: PHYSICIAN ASSISTANT

## 2024-02-01 PROCEDURE — 25010000002 CEFTRIAXONE PER 250 MG: Performed by: PHYSICIAN ASSISTANT

## 2024-02-01 RX ORDER — FLUCONAZOLE 100 MG/1
150 TABLET ORAL ONCE
Status: COMPLETED | OUTPATIENT
Start: 2024-02-01 | End: 2024-02-01

## 2024-02-01 RX ORDER — OFLOXACIN 3 MG/ML
5 SOLUTION AURICULAR (OTIC) DAILY
Qty: 5 ML | Refills: 0 | Status: SHIPPED | OUTPATIENT
Start: 2024-02-01

## 2024-02-01 RX ORDER — CEFDINIR 300 MG/1
300 CAPSULE ORAL 2 TIMES DAILY
Qty: 14 CAPSULE | Refills: 0 | Status: SHIPPED | OUTPATIENT
Start: 2024-02-01

## 2024-02-01 RX ADMIN — LIDOCAINE HYDROCHLORIDE 1 G: 10 INJECTION, SOLUTION EPIDURAL; INFILTRATION; INTRACAUDAL; PERINEURAL at 13:55

## 2024-02-01 RX ADMIN — FLUCONAZOLE 150 MG: 100 TABLET ORAL at 13:55

## 2024-02-01 NOTE — DISCHARGE INSTRUCTIONS
Should you have a reaction to ear drop antibiotic, recommend you stop medication and follow up with PCP or return to the ER.

## 2024-02-01 NOTE — ED PROVIDER NOTES
Subjective   History of Present Illness  58-year-old female with past medical history of migraines, fibromyalgia, hypercholesterolemia, anxiety, arthritis, COPD, and reflux and pathetic dystrophy presents to the emergency room with vaginal burning and itching the past 4 days.  Patient states that she has burning with urination and has had thick white discharge which has since cleared and now has clear discharge.  Patient also complains of right ear pain which she states began suddenly this morning.  She denies any recent illness.  She also reports possible fever yesterday as states she took her temperature with 2 separate thermometers the first being 99.5 and the second being 101.7 however states she did not take anything and it resolved spontaneously.  Denies any other complaints or concerns at this time.    History provided by:  Patient   used: No        Review of Systems   Constitutional: Negative.  Negative for fever.   HENT:  Positive for ear pain.    Respiratory: Negative.     Cardiovascular: Negative.  Negative for chest pain.   Gastrointestinal: Negative.  Negative for abdominal pain.   Endocrine: Negative.    Genitourinary:  Positive for dysuria and vaginal discharge. Negative for decreased urine volume and vaginal bleeding.        Burning   Skin: Negative.    Neurological: Negative.    Psychiatric/Behavioral: Negative.     All other systems reviewed and are negative.      Past Medical History:   Diagnosis Date    Anxiety 06/25/2018    Arthritis     COPD (chronic obstructive pulmonary disease)     Elevated cholesterol     Fibromyalgia     Migraines     RSD (reflex sympathetic dystrophy)        Allergies   Allergen Reactions    Floxin [Ofloxacin]        No past surgical history on file.    Family History   Problem Relation Age of Onset    Hypertension Mother     Hypertension Father     Heart disease Sister     Diabetes Sister     Breast cancer Neg Hx        Social History      Socioeconomic History    Marital status:    Tobacco Use    Smoking status: Every Day     Packs/day: .5     Types: Cigarettes    Smokeless tobacco: Never    Tobacco comments:     smoked about 2 ppd for about 15 years. recently cut down to 1/2 ppd.   Vaping Use    Vaping Use: Never used   Substance and Sexual Activity    Alcohol use: No    Drug use: No    Sexual activity: Defer           Objective   Physical Exam  Vitals and nursing note reviewed.   Constitutional:       General: She is not in acute distress.     Appearance: She is well-developed. She is not diaphoretic.   HENT:      Head: Normocephalic and atraumatic.      Right Ear: Hearing, tympanic membrane and external ear normal. Tenderness present. A middle ear effusion is present.      Left Ear: Hearing, tympanic membrane, ear canal and external ear normal.      Ears:      Comments: Erythematous right canal     Nose: Nose normal.   Eyes:      Conjunctiva/sclera: Conjunctivae normal.      Pupils: Pupils are equal, round, and reactive to light.   Neck:      Vascular: No JVD.      Trachea: No tracheal deviation.   Cardiovascular:      Rate and Rhythm: Normal rate and regular rhythm.      Heart sounds: Normal heart sounds. No murmur heard.  Pulmonary:      Effort: Pulmonary effort is normal. No respiratory distress.      Breath sounds: Normal breath sounds. No wheezing.   Abdominal:      General: Bowel sounds are normal.      Palpations: Abdomen is soft.      Tenderness: There is no abdominal tenderness.   Musculoskeletal:         General: No deformity. Normal range of motion.      Cervical back: Normal range of motion and neck supple.   Skin:     General: Skin is warm and dry.      Coloration: Skin is not pale.      Findings: No erythema or rash.   Neurological:      Mental Status: She is alert and oriented to person, place, and time.      Cranial Nerves: No cranial nerve deficit.   Psychiatric:         Behavior: Behavior normal.         Thought  Content: Thought content normal.         Procedures           ED Course  ED Course as of 02/01/24 1526   Thu Feb 01, 2024   1422 WBC(!): 11.48 [TK]   1422 C-Reactive Protein(!): 1.13 [TK]   1422 Procalcitonin: 0.05 [TK]   1422 Lactate: 0.9 [TK]      ED Course User Index  [TK] Scott Hernandez PA-C                                   Results for orders placed or performed during the hospital encounter of 02/01/24   Urinalysis With Microscopic If Indicated (No Culture) - Urine, Clean Catch    Specimen: Urine, Clean Catch   Result Value Ref Range    Color, UA Yellow Yellow, Straw    Appearance, UA Cloudy (A) Clear    pH, UA 6.0 5.0 - 8.0    Specific Gravity, UA 1.008 1.005 - 1.030    Glucose, UA Negative Negative    Ketones, UA Negative Negative    Bilirubin, UA Negative Negative    Blood, UA Moderate (2+) (A) Negative    Protein, UA Trace (A) Negative    Leuk Esterase, UA Large (3+) (A) Negative    Nitrite, UA Negative Negative    Urobilinogen, UA 0.2 E.U./dL 0.2 - 1.0 E.U./dL   Urinalysis, Microscopic Only - Urine, Clean Catch    Specimen: Urine, Clean Catch   Result Value Ref Range    RBC, UA 3-5 (A) None Seen, 0-2 /HPF    WBC, UA Too Numerous to Count (A) None Seen, 0-2 /HPF    Bacteria, UA Trace (A) None Seen /HPF    Squamous Epithelial Cells, UA 0-2 None Seen, 0-2 /HPF    Hyaline Casts, UA None Seen None Seen /LPF    Methodology Manual Light Microscopy    Comprehensive Metabolic Panel    Specimen: Blood   Result Value Ref Range    Glucose 115 (H) 65 - 99 mg/dL    BUN 5 (L) 6 - 20 mg/dL    Creatinine 0.62 0.57 - 1.00 mg/dL    Sodium 139 136 - 145 mmol/L    Potassium 4.1 3.5 - 5.2 mmol/L    Chloride 103 98 - 107 mmol/L    CO2 24.0 22.0 - 29.0 mmol/L    Calcium 9.3 8.6 - 10.5 mg/dL    Total Protein 7.4 6.0 - 8.5 g/dL    Albumin 4.4 3.5 - 5.2 g/dL    ALT (SGPT) 23 1 - 33 U/L    AST (SGOT) 19 1 - 32 U/L    Alkaline Phosphatase 103 39 - 117 U/L    Total Bilirubin 0.3 0.0 - 1.2 mg/dL    Globulin 3.0 gm/dL    A/G Ratio  1.5 g/dL    BUN/Creatinine Ratio 8.1 7.0 - 25.0    Anion Gap 12.0 5.0 - 15.0 mmol/L    eGFR 104.7 >60.0 mL/min/1.73   C-reactive Protein    Specimen: Blood   Result Value Ref Range    C-Reactive Protein 1.13 (H) 0.00 - 0.50 mg/dL   Procalcitonin    Specimen: Blood   Result Value Ref Range    Procalcitonin 0.05 0.00 - 0.25 ng/mL   CBC Auto Differential    Specimen: Blood   Result Value Ref Range    WBC 11.48 (H) 3.40 - 10.80 10*3/mm3    RBC 4.17 3.77 - 5.28 10*6/mm3    Hemoglobin 14.5 12.0 - 15.9 g/dL    Hematocrit 43.8 34.0 - 46.6 %    .0 (H) 79.0 - 97.0 fL    MCH 34.8 (H) 26.6 - 33.0 pg    MCHC 33.1 31.5 - 35.7 g/dL    RDW 12.3 12.3 - 15.4 %    RDW-SD 48.2 37.0 - 54.0 fl    MPV 9.2 6.0 - 12.0 fL    Platelets 275 140 - 450 10*3/mm3    Neutrophil % 71.1 42.7 - 76.0 %    Lymphocyte % 20.4 19.6 - 45.3 %    Monocyte % 7.1 5.0 - 12.0 %    Eosinophil % 0.3 0.3 - 6.2 %    Basophil % 0.7 0.0 - 1.5 %    Immature Grans % 0.4 0.0 - 0.5 %    Neutrophils, Absolute 8.16 (H) 1.70 - 7.00 10*3/mm3    Lymphocytes, Absolute 2.34 0.70 - 3.10 10*3/mm3    Monocytes, Absolute 0.82 0.10 - 0.90 10*3/mm3    Eosinophils, Absolute 0.03 0.00 - 0.40 10*3/mm3    Basophils, Absolute 0.08 0.00 - 0.20 10*3/mm3    Immature Grans, Absolute 0.05 0.00 - 0.05 10*3/mm3    nRBC 0.0 0.0 - 0.2 /100 WBC   [facility] Urine Culture Tube - Urine, Clean Catch    Specimen: Urine, Clean Catch   Result Value Ref Range    Extra Tube Hold for add-ons.    Lactic Acid, Plasma    Specimen: Blood   Result Value Ref Range    Lactate 0.9 0.5 - 2.0 mmol/L   Green Top (Gel)   Result Value Ref Range    Extra Tube Hold for add-ons.    Lavender Top   Result Value Ref Range    Extra Tube hold for add-on    Gold Top - SST   Result Value Ref Range    Extra Tube Hold for add-ons.    Light Blue Top   Result Value Ref Range    Extra Tube Hold for add-ons.        No orders to display                 Medical Decision Making  58-year-old female with past medical history of migraines,  fibromyalgia, hypercholesterolemia, anxiety, arthritis, COPD, and reflux and pathetic dystrophy presents to the emergency room with vaginal burning and itching the past 4 days.  Patient states that she has burning with urination and has had thick white discharge which has since cleared and now has clear discharge.  Patient also complains of right ear pain which she states began suddenly this morning.  She denies any recent illness.  She also reports possible fever yesterday as states she took her temperature with 2 separate thermometers the first being 99.5 and the second being 101.7 however states she did not take anything and it resolved spontaneously.  Denies any other complaints or concerns at this time.      Problems Addressed:  Acute otitis externa of right ear, unspecified type: complicated acute illness or injury  UTI (urinary tract infection), uncomplicated: complicated acute illness or injury  Vaginal candidiasis: complicated acute illness or injury    Amount and/or Complexity of Data Reviewed  Labs: ordered. Decision-making details documented in ED Course.    Risk  Prescription drug management.        Final diagnoses:   Acute otitis externa of right ear, unspecified type   UTI (urinary tract infection), uncomplicated   Vaginal candidiasis       ED Disposition  ED Disposition       ED Disposition   Discharge    Condition   Stable    Comment   --               Yvonne Marr MD  [address] [facility] [address] [phone]    In 2 days           Medication List        New Prescriptions      cefdinir 300 MG capsule  Commonly known as: OMNICEF  Take 1 capsule by mouth 2 (Two) Times a Day.     ofloxacin 0.3 % otic solution  Commonly known as: FLOXIN  Administer 5 drops to the right ear Daily.               Where to Get Your Medications        These medications were sent to [facility] - [place], [place] - [address] [facility] [phone] [facility] [phone] [address] [address]       Phone: [phone]   cefdinir 300 MG capsule  ofloxacin 0.3 % otic solution            Scott Hernandez PA-C  02/01/24 5519

## 2024-02-02 LAB — BACTERIA SPEC AEROBE CULT: NO GROWTH

## 2024-02-06 LAB
BACTERIA SPEC AEROBE CULT: NORMAL
BACTERIA SPEC AEROBE CULT: NORMAL

## 2024-03-06 ENCOUNTER — HOSPITAL ENCOUNTER (EMERGENCY)
Facility: HOSPITAL | Age: 59
Discharge: HOME | End: 2024-03-06
Attending: EMERGENCY MEDICINE
Payer: COMMERCIAL

## 2024-03-06 ENCOUNTER — APPOINTMENT (OUTPATIENT)
Dept: CT IMAGING | Facility: HOSPITAL | Age: 59
End: 2024-03-06
Payer: COMMERCIAL

## 2024-03-06 VITALS
DIASTOLIC BLOOD PRESSURE: 73 MMHG | SYSTOLIC BLOOD PRESSURE: 162 MMHG | WEIGHT: 139 LBS | HEIGHT: 65 IN | HEART RATE: 117 BPM | TEMPERATURE: 98.4 F | RESPIRATION RATE: 16 BRPM | OXYGEN SATURATION: 100 % | BODY MASS INDEX: 23.16 KG/M2

## 2024-03-06 DIAGNOSIS — N30.01 ACUTE CYSTITIS WITH HEMATURIA: Primary | ICD-10-CM

## 2024-03-06 LAB
ALBUMIN SERPL-MCNC: 4.3 G/DL (ref 3.5–5.2)
ALBUMIN/GLOB SERPL: 1.3 G/DL
ALP SERPL-CCNC: 95 U/L (ref 39–117)
ALT SERPL W P-5'-P-CCNC: 11 U/L (ref 1–33)
ANION GAP SERPL CALCULATED.3IONS-SCNC: 11.6 MMOL/L (ref 5–15)
AST SERPL-CCNC: 14 U/L (ref 1–32)
BACTERIA UR QL AUTO: ABNORMAL /HPF
BASOPHILS # BLD AUTO: 0.09 10*3/MM3 (ref 0–0.2)
BASOPHILS NFR BLD AUTO: 1 % (ref 0–1.5)
BILIRUB SERPL-MCNC: 0.2 MG/DL (ref 0–1.2)
BILIRUB UR QL STRIP: NEGATIVE
BUN SERPL-MCNC: 6 MG/DL (ref 6–20)
BUN/CREAT SERPL: 8.2 (ref 7–25)
CALCIUM SPEC-SCNC: 9.3 MG/DL (ref 8.6–10.5)
CHLORIDE SERPL-SCNC: 105 MMOL/L (ref 98–107)
CLARITY UR: CLEAR
CO2 SERPL-SCNC: 21.4 MMOL/L (ref 22–29)
COLOR UR: YELLOW
CREAT SERPL-MCNC: 0.73 MG/DL (ref 0.57–1)
DEPRECATED RDW RBC AUTO: 49.8 FL (ref 37–54)
EGFRCR SERPLBLD CKD-EPI 2021: 96.7 ML/MIN/1.73
EOSINOPHIL # BLD AUTO: 0.09 10*3/MM3 (ref 0–0.4)
EOSINOPHIL NFR BLD AUTO: 1 % (ref 0.3–6.2)
ERYTHROCYTE [DISTWIDTH] IN BLOOD BY AUTOMATED COUNT: 12.7 % (ref 12.3–15.4)
GLOBULIN UR ELPH-MCNC: 3.2 GM/DL
GLUCOSE SERPL-MCNC: 110 MG/DL (ref 65–99)
GLUCOSE UR STRIP-MCNC: NEGATIVE MG/DL
HCT VFR BLD AUTO: 42.5 % (ref 34–46.6)
HGB BLD-MCNC: 14 G/DL (ref 12–15.9)
HGB UR QL STRIP.AUTO: ABNORMAL
HOLD SPECIMEN: NORMAL
HOLD SPECIMEN: NORMAL
HYALINE CASTS UR QL AUTO: ABNORMAL /LPF
IMM GRANULOCYTES # BLD AUTO: 0.03 10*3/MM3 (ref 0–0.05)
IMM GRANULOCYTES NFR BLD AUTO: 0.3 % (ref 0–0.5)
KETONES UR QL STRIP: NEGATIVE
LEUKOCYTE ESTERASE UR QL STRIP.AUTO: ABNORMAL
LYMPHOCYTES # BLD AUTO: 4.34 10*3/MM3 (ref 0.7–3.1)
LYMPHOCYTES NFR BLD AUTO: 46.5 % (ref 19.6–45.3)
MCH RBC QN AUTO: 34.8 PG (ref 26.6–33)
MCHC RBC AUTO-ENTMCNC: 32.9 G/DL (ref 31.5–35.7)
MCV RBC AUTO: 105.7 FL (ref 79–97)
MONOCYTES # BLD AUTO: 0.53 10*3/MM3 (ref 0.1–0.9)
MONOCYTES NFR BLD AUTO: 5.7 % (ref 5–12)
NEUTROPHILS NFR BLD AUTO: 4.26 10*3/MM3 (ref 1.7–7)
NEUTROPHILS NFR BLD AUTO: 45.5 % (ref 42.7–76)
NITRITE UR QL STRIP: NEGATIVE
NRBC BLD AUTO-RTO: 0 /100 WBC (ref 0–0.2)
PH UR STRIP.AUTO: 6 [PH] (ref 5–8)
PLATELET # BLD AUTO: 310 10*3/MM3 (ref 140–450)
PMV BLD AUTO: 9.2 FL (ref 6–12)
POTASSIUM SERPL-SCNC: 4.4 MMOL/L (ref 3.5–5.2)
PROT SERPL-MCNC: 7.5 G/DL (ref 6–8.5)
PROT UR QL STRIP: NEGATIVE
RBC # BLD AUTO: 4.02 10*6/MM3 (ref 3.77–5.28)
RBC # UR STRIP: ABNORMAL /HPF
REF LAB TEST METHOD: ABNORMAL
SODIUM SERPL-SCNC: 138 MMOL/L (ref 136–145)
SP GR UR STRIP: 1.01 (ref 1–1.03)
SQUAMOUS #/AREA URNS HPF: ABNORMAL /HPF
UROBILINOGEN UR QL STRIP: ABNORMAL
WBC # UR STRIP: ABNORMAL /HPF
WBC NRBC COR # BLD AUTO: 9.34 10*3/MM3 (ref 3.4–10.8)
WHOLE BLOOD HOLD COAG: NORMAL
WHOLE BLOOD HOLD SPECIMEN: NORMAL

## 2024-03-06 PROCEDURE — 85025 COMPLETE CBC W/AUTO DIFF WBC: CPT | Performed by: PHYSICIAN ASSISTANT

## 2024-03-06 PROCEDURE — 25510000001 IOPAMIDOL 61 % SOLUTION: Performed by: EMERGENCY MEDICINE

## 2024-03-06 PROCEDURE — 74177 CT ABD & PELVIS W/CONTRAST: CPT | Performed by: RADIOLOGY

## 2024-03-06 PROCEDURE — 80053 COMPREHEN METABOLIC PANEL: CPT | Performed by: PHYSICIAN ASSISTANT

## 2024-03-06 PROCEDURE — 87086 URINE CULTURE/COLONY COUNT: CPT | Performed by: PHYSICIAN ASSISTANT

## 2024-03-06 PROCEDURE — 36415 COLL VENOUS BLD VENIPUNCTURE: CPT

## 2024-03-06 PROCEDURE — 81001 URINALYSIS AUTO W/SCOPE: CPT | Performed by: PHYSICIAN ASSISTANT

## 2024-03-06 PROCEDURE — 99285 EMERGENCY DEPT VISIT HI MDM: CPT

## 2024-03-06 PROCEDURE — 25810000003 SODIUM CHLORIDE 0.9 % SOLUTION: Performed by: PHYSICIAN ASSISTANT

## 2024-03-06 PROCEDURE — 74177 CT ABD & PELVIS W/CONTRAST: CPT

## 2024-03-06 RX ORDER — NAPROXEN 500 MG/1
500 TABLET ORAL 2 TIMES DAILY PRN
Qty: 12 TABLET | Refills: 0 | Status: SHIPPED | OUTPATIENT
Start: 2024-03-06

## 2024-03-06 RX ADMIN — IOPAMIDOL 84 ML: 612 INJECTION, SOLUTION INTRAVENOUS at 14:26

## 2024-03-06 RX ADMIN — SODIUM CHLORIDE 1000 ML: 9 INJECTION, SOLUTION INTRAVENOUS at 14:03

## 2024-03-07 LAB — BACTERIA SPEC AEROBE CULT: NO GROWTH

## 2024-03-13 ENCOUNTER — OFFICE VISIT (OUTPATIENT)
Dept: UROLOGY | Facility: CLINIC | Age: 59
End: 2024-03-13
Payer: COMMERCIAL

## 2024-03-13 VITALS
WEIGHT: 140.2 LBS | HEIGHT: 65 IN | SYSTOLIC BLOOD PRESSURE: 147 MMHG | BODY MASS INDEX: 23.36 KG/M2 | HEART RATE: 114 BPM | DIASTOLIC BLOOD PRESSURE: 81 MMHG

## 2024-03-13 DIAGNOSIS — R31.29 OTHER MICROSCOPIC HEMATURIA: Primary | ICD-10-CM

## 2024-03-13 DIAGNOSIS — R10.2 PELVIC PAIN: ICD-10-CM

## 2024-03-13 LAB
BILIRUB BLD-MCNC: NEGATIVE MG/DL
CLARITY, POC: CLEAR
COLOR UR: YELLOW
EXPIRATION DATE: ABNORMAL
GLUCOSE UR STRIP-MCNC: NEGATIVE MG/DL
KETONES UR QL: NEGATIVE
LEUKOCYTE EST, POC: ABNORMAL
Lab: ABNORMAL
NITRITE UR-MCNC: NEGATIVE MG/ML
PH UR: 6 [PH] (ref 5–8)
PROT UR STRIP-MCNC: NEGATIVE MG/DL
RBC # UR STRIP: ABNORMAL /UL
SP GR UR: 1.01 (ref 1–1.03)
UROBILINOGEN UR QL: NORMAL

## 2024-03-13 PROCEDURE — 87086 URINE CULTURE/COLONY COUNT: CPT

## 2024-03-13 NOTE — ED PROVIDER NOTES
Subjective   History of Present Illness  Pt is having right sided lower abd pain pt was treated for uti a last week just finished meds  Pt pain not improving   Pt has pmhx of COPD, Arthritis, Migraine, Anxiety and fibromyalgia     History provided by:  Patient   used: No    Abdominal Pain  Pain location:  Generalized  Pain quality: aching    Pain radiates to:  Does not radiate  Pain severity:  Mild  Onset quality:  Sudden  Timing:  Constant  Chronicity:  New  Relieved by:  Nothing  Worsened by:  Nothing  Ineffective treatments:  None tried      Review of Systems   Gastrointestinal:  Positive for abdominal pain.       Past Medical History:   Diagnosis Date    Anxiety 06/25/2018    Arthritis     COPD (chronic obstructive pulmonary disease)     Elevated cholesterol     Fibromyalgia     Migraines     RSD (reflex sympathetic dystrophy)        Allergies   Allergen Reactions    Floxin [Ofloxacin]        No past surgical history on file.    Family History   Problem Relation Age of Onset    Hypertension Mother     Hypertension Father     Heart disease Sister     Diabetes Sister     Breast cancer Neg Hx        Social History     Socioeconomic History    Marital status:    Tobacco Use    Smoking status: Every Day     Current packs/day: 0.50     Types: Cigarettes    Smokeless tobacco: Never    Tobacco comments:     smoked about 2 ppd for about 15 years. recently cut down to 1/2 ppd.   Vaping Use    Vaping status: Never Used   Substance and Sexual Activity    Alcohol use: No    Drug use: No    Sexual activity: Defer           Objective   Physical Exam  Vitals and nursing note reviewed.   Constitutional:       Appearance: She is well-developed.   HENT:      Head: Normocephalic.   Cardiovascular:      Rate and Rhythm: Normal rate and regular rhythm.   Pulmonary:      Effort: Pulmonary effort is normal.      Breath sounds: Normal breath sounds.   Abdominal:      General: Bowel sounds are normal.       Palpations: Abdomen is soft.      Tenderness: There is no abdominal tenderness.   Musculoskeletal:         General: Normal range of motion.      Cervical back: Neck supple.   Skin:     General: Skin is warm and dry.   Neurological:      Mental Status: She is alert and oriented to person, place, and time.   Psychiatric:         Behavior: Behavior normal.         Thought Content: Thought content normal.         Judgment: Judgment normal.         Procedures           ED Course      Results for orders placed or performed during the hospital encounter of 03/06/24   Urine Culture - Urine, Urine, Clean Catch    Specimen: Urine, Clean Catch   Result Value Ref Range    Urine Culture No growth    Comprehensive Metabolic Panel    Specimen: Blood   Result Value Ref Range    Glucose 110 (H) 65 - 99 mg/dL    BUN 6 6 - 20 mg/dL    Creatinine 0.73 0.57 - 1.00 mg/dL    Sodium 138 136 - 145 mmol/L    Potassium 4.4 3.5 - 5.2 mmol/L    Chloride 105 98 - 107 mmol/L    CO2 21.4 (L) 22.0 - 29.0 mmol/L    Calcium 9.3 8.6 - 10.5 mg/dL    Total Protein 7.5 6.0 - 8.5 g/dL    Albumin 4.3 3.5 - 5.2 g/dL    ALT (SGPT) 11 1 - 33 U/L    AST (SGOT) 14 1 - 32 U/L    Alkaline Phosphatase 95 39 - 117 U/L    Total Bilirubin 0.2 0.0 - 1.2 mg/dL    Globulin 3.2 gm/dL    A/G Ratio 1.3 g/dL    BUN/Creatinine Ratio 8.2 7.0 - 25.0    Anion Gap 11.6 5.0 - 15.0 mmol/L    eGFR 96.7 >60.0 mL/min/1.73   Urinalysis With Culture If Indicated - Urine, Clean Catch    Specimen: Urine, Clean Catch   Result Value Ref Range    Color, UA Yellow Yellow, Straw    Appearance, UA Clear Clear    pH, UA 6.0 5.0 - 8.0    Specific Gravity, UA 1.009 1.005 - 1.030    Glucose, UA Negative Negative    Ketones, UA Negative Negative    Bilirubin, UA Negative Negative    Blood, UA Moderate (2+) (A) Negative    Protein, UA Negative Negative    Leuk Esterase, UA Trace (A) Negative    Nitrite, UA Negative Negative    Urobilinogen, UA 0.2 E.U./dL 0.2 - 1.0 E.U./dL   CBC Auto Differential     Specimen: Blood   Result Value Ref Range    WBC 9.34 3.40 - 10.80 10*3/mm3    RBC 4.02 3.77 - 5.28 10*6/mm3    Hemoglobin 14.0 12.0 - 15.9 g/dL    Hematocrit 42.5 34.0 - 46.6 %    .7 (H) 79.0 - 97.0 fL    MCH 34.8 (H) 26.6 - 33.0 pg    MCHC 32.9 31.5 - 35.7 g/dL    RDW 12.7 12.3 - 15.4 %    RDW-SD 49.8 37.0 - 54.0 fl    MPV 9.2 6.0 - 12.0 fL    Platelets 310 140 - 450 10*3/mm3    Neutrophil % 45.5 42.7 - 76.0 %    Lymphocyte % 46.5 (H) 19.6 - 45.3 %    Monocyte % 5.7 5.0 - 12.0 %    Eosinophil % 1.0 0.3 - 6.2 %    Basophil % 1.0 0.0 - 1.5 %    Immature Grans % 0.3 0.0 - 0.5 %    Neutrophils, Absolute 4.26 1.70 - 7.00 10*3/mm3    Lymphocytes, Absolute 4.34 (H) 0.70 - 3.10 10*3/mm3    Monocytes, Absolute 0.53 0.10 - 0.90 10*3/mm3    Eosinophils, Absolute 0.09 0.00 - 0.40 10*3/mm3    Basophils, Absolute 0.09 0.00 - 0.20 10*3/mm3    Immature Grans, Absolute 0.03 0.00 - 0.05 10*3/mm3    nRBC 0.0 0.0 - 0.2 /100 WBC   Urinalysis, Microscopic Only - Urine, Clean Catch    Specimen: Urine, Clean Catch   Result Value Ref Range    RBC, UA 6-10 (A) None Seen, 0-2 /HPF    WBC, UA 6-10 (A) None Seen, 0-2 /HPF    Bacteria, UA None Seen None Seen /HPF    Squamous Epithelial Cells, UA 0-2 None Seen, 0-2 /HPF    Hyaline Casts, UA None Seen None Seen /LPF    Methodology Automated Microscopy    Green Top (Gel)   Result Value Ref Range    Extra Tube Hold for add-ons.    Lavender Top   Result Value Ref Range    Extra Tube hold for add-on    Gold Top - SST   Result Value Ref Range    Extra Tube Hold for add-ons.    Light Blue Top   Result Value Ref Range    Extra Tube Hold for add-ons.                                            Medical Decision Making  Pt is having right sided lower abd pain pt was treated for uti a last week just finished meds  Pt pain not improving   Pt has pmhx of COPD, Arthritis, Migraine, Anxiety and fibromyalgia       Problems Addressed:  Acute cystitis with hematuria: complicated acute illness or  injury    Amount and/or Complexity of Data Reviewed  Labs: ordered.  Radiology: ordered.    Risk  Prescription drug management.  Risk Details: Trupti Tee    Patient is stable.  Patient is medically cleared.  No EMC exist.  Patient is discharged home.  Patient's diagnostic results were discussed with him and they demonstrated understanding of diagnosis and treatment plan.  Patient was advised to return to the ER or follow-up with PCP if symptoms worsen or fail to improve as expected.         Final diagnoses:   Acute cystitis with hematuria       ED Disposition  ED Disposition       ED Disposition   Discharge    Condition   Stable    Comment   --               Yvonne Marr MD  [address]  [facility] [address]  [phone]    In 2 days      Sanchez Larry PA-C  [address]  [address]  [facility] [address]  [phone]    In 2 days           Medication List        New Prescriptions      naproxen 500 MG EC tablet  Commonly known as: EC NAPROSYN  Take 1 tablet by mouth 2 (Two) Times a Day As Needed for Mild Pain.               Where to Get Your Medications        You can get these medications from any pharmacy    Bring a paper prescription for each of these medications  naproxen 500 MG EC tablet            Trupti Oliveira PA  03/12/24 1533

## 2024-03-13 NOTE — PROGRESS NOTES
"Chief Complaint:    Chief Complaint   Patient presents with    Blood in Urine       Vital Signs:   /81 (BP Location: Left arm, Patient Position: Sitting)   Pulse 114   Ht 165.1 cm (65\")   Wt 63.6 kg (140 lb 3.2 oz)   BMI 23.33 kg/m²   Body mass index is 23.33 kg/m².      HPI:  Basilia Chamberlain is a 58 y.o. female who presents today for initial evaluation     History of Present Illness  Ms. Chamberlain presents to the clinic for evaluation of microscopic hematuria.  She has been referred to us by Maribell WARNER.  Patient has a past medical history significant for anxiety, arthritis, COPD, fibromyalgia, and RSD.  She is also smoke roughly 1/2 pack/day for 30+ years.  Patient still currently smokes.  She began to have significant lower abdominal pain as well as dysuria, frequency, and urgency and went to the emergency department on 2/1/2024 for evaluation.  She had a urine analysis completed at that time that showed 3+ leukocytes, trace protein, and 2+ blood.  Microscopic UA revealed too numerous to count white blood cells, 3-5 red blood cells, and trace bacteria.  She was prescribed antibiotics at that time due to concerns of acute urinary tract infection.  Urine culture completed showed no significant growth.  She reports symptoms did improve and then presented back to the emergency department on 3/6/2024 for right lower quadrant pain with radiation to the leg.  Patient reports that she felt a pop and was concerned for acute appendicitis.  She had a repeat urine analysis completed at that time that showed trace leukocytes, and 2+ blood.  Urine microscopic showed 6-10 red blood cells and 6-10 white blood cells.  No bacteria noted.  Urine was again sent off for culture and showed no growth.  CT scan of the abdomen and pelvis with contrast was unremarkable.  There was also a possible noted ovarian/uterine fibroid on CT that was present in May 2023 as well.  There were no acute signs of cystitis.  " Bladder wall was unremarkable.  Kidneys and ureters were unremarkable as well.  She was diagnosed with pulled muscle in the groin and sent home with pain medication.  She denies any gross hematuria.  She reports abdominal pain is improved but she is still having right inguinal pain with radiation into the upper thigh.  Urine analysis in office today shows 3+ leukocytes and 2+ blood.  No signs of nitrites.      Past Medical History:  Past Medical History:   Diagnosis Date    Anxiety 06/25/2018    Arthritis     COPD (chronic obstructive pulmonary disease)     Elevated cholesterol     Fibromyalgia     Migraines     RSD (reflex sympathetic dystrophy)        Current Meds:  Current Outpatient Medications   Medication Sig Dispense Refill    acetaminophen (TYLENOL) 500 MG tablet Take 1 tablet by mouth Every 6 (Six) Hours As Needed for Mild Pain.      albuterol sulfate  (90 Base) MCG/ACT inhaler Inhale 2 puffs Every 4 (Four) Hours As Needed for Wheezing. 18 g 5    Allergy Relief 10 MG tablet       ALPRAZolam (XANAX) 2 MG tablet Take 1 tablet by mouth.      amitriptyline (ELAVIL) 25 MG tablet       aspirin 81 MG EC tablet Take 1 tablet by mouth Daily. 90 tablet 1    atorvastatin (LIPITOR) 20 MG tablet Take 1 tablet by mouth Daily. 90 tablet 1    cefdinir (OMNICEF) 300 MG capsule Take 1 capsule by mouth 2 (Two) Times a Day. 14 capsule 0    escitalopram (LEXAPRO) 5 MG tablet Take 1 tablet by mouth Daily.      fluticasone (FLOVENT HFA) 220 MCG/ACT inhaler Inhale 2 puffs 2 (Two) Times a Day. 12 g 5    hydroCHLOROthiazide (HYDRODIURIL) 25 MG tablet Take 0.5 tablets by mouth Daily. 45 tablet 1    ipratropium-albuterol (DUO-NEB) 0.5-2.5 mg/3 ml nebulizer Take 3 mL by nebulization 4 (Four) Times a Day As Needed for Wheezing or Shortness of Air. 120 mL 5    methocarbamol (ROBAXIN) 750 MG tablet Take 1 tablet by mouth 4 (Four) Times a Day As Needed for Muscle Spasms.      metoprolol succinate XL (TOPROL-XL) 25 MG 24 hr tablet  Take 0.5 tablets by mouth 2 (Two) Times a Day. 90 tablet 1    naproxen (EC NAPROSYN) 500 MG EC tablet Take 1 tablet by mouth 2 (Two) Times a Day As Needed for Mild Pain. 12 tablet 0    O2 (OXYGEN) Inhale 2 L/min Every Night.      ofloxacin (FLOXIN) 0.3 % otic solution Administer 5 drops to the right ear Daily. 5 mL 0    predniSONE (DELTASONE) 20 MG tablet Take 1 tablet by mouth Daily.      Stiolto Respimat 2.5-2.5 MCG/ACT aerosol solution inhaler Inhale 2 puffs Daily. 4 g 5    vitamin D (ERGOCALCIFEROL) 1.25 MG (80295 UT) capsule capsule Take 1 capsule by mouth 1 (One) Time Per Week.       No current facility-administered medications for this visit.        Allergies:   Allergies   Allergen Reactions    Floxin [Ofloxacin]         Past Surgical History:  History reviewed. No pertinent surgical history.    Social History:  Social History     Socioeconomic History    Marital status:    Tobacco Use    Smoking status: Every Day     Current packs/day: 0.50     Types: Cigarettes    Smokeless tobacco: Never    Tobacco comments:     smoked about 2 ppd for about 15 years. recently cut down to 1/2 ppd.   Vaping Use    Vaping status: Never Used   Substance and Sexual Activity    Alcohol use: No    Drug use: No    Sexual activity: Defer       Family History:  Family History   Problem Relation Age of Onset    Hypertension Mother     Hypertension Father     Heart disease Sister     Diabetes Sister     Breast cancer Neg Hx        Review of Systems:  Review of Systems   Constitutional:  Negative for chills, fatigue, fever and unexpected weight change.   Respiratory:  Positive for shortness of breath. Negative for cough, chest tightness and wheezing.    Cardiovascular:  Negative for chest pain and leg swelling.   Gastrointestinal:  Positive for abdominal pain. Negative for constipation, diarrhea, nausea and vomiting.   Genitourinary:  Negative for difficulty urinating, dysuria, frequency, pelvic pain and urgency.    Musculoskeletal:  Negative for back pain and joint swelling.   Skin:  Negative for rash.   Neurological:  Positive for headaches. Negative for dizziness.   Psychiatric/Behavioral:  Negative for confusion and suicidal ideas.        Physical Exam:  Physical Exam  Constitutional:       General: She is not in acute distress.     Appearance: Normal appearance.   HENT:      Head: Normocephalic and atraumatic.      Nose: Nose normal.      Mouth/Throat:      Mouth: Mucous membranes are moist.   Eyes:      Conjunctiva/sclera: Conjunctivae normal.   Cardiovascular:      Rate and Rhythm: Normal rate and regular rhythm.      Pulses: Normal pulses.      Heart sounds: Normal heart sounds.   Pulmonary:      Effort: Pulmonary effort is normal.      Breath sounds: Normal breath sounds.   Abdominal:      General: Bowel sounds are normal.      Palpations: Abdomen is soft.   Musculoskeletal:         General: Normal range of motion.      Cervical back: Normal range of motion.   Skin:     General: Skin is warm.   Neurological:      General: No focal deficit present.      Mental Status: She is alert and oriented to person, place, and time.   Psychiatric:         Mood and Affect: Mood normal.         Behavior: Behavior normal.         Thought Content: Thought content normal.         Judgment: Judgment normal.                        Recent Image (CT and/or KUB):   CT Abdomen and Pelvis: No results found for this or any previous visit.     CT Stone Protocol: No results found for this or any previous visit.     KUB: No results found for this or any previous visit.       Labs:  Brief Urine Lab Results  (Last result in the past 365 days)        Color   Clarity   Blood   Leuk Est   Nitrite   Protein   CREAT   Urine HCG        03/13/24 1300 Yellow   Clear   2+   500 Gita/ul   Negative   Negative                 Office Visit on 03/13/2024   Component Date Value Ref Range Status    Color 03/13/2024 Yellow  Yellow, Straw, Dark Yellow, Danni Final     Clarity, UA 03/13/2024 Clear  Clear Final    Specific Gravity  03/13/2024 1.010  1.005 - 1.030 Final    pH, Urine 03/13/2024 6.0  5.0 - 8.0 Final    Leukocytes 03/13/2024 500 Gita/ul (A)  Negative Final    Nitrite, UA 03/13/2024 Negative  Negative Final    Protein, POC 03/13/2024 Negative  Negative mg/dL Final    Glucose, UA 03/13/2024 Negative  Negative mg/dL Final    Ketones, UA 03/13/2024 Negative  Negative Final    Urobilinogen, UA 03/13/2024 Normal  Normal, 0.2 E.U./dL Final    Bilirubin 03/13/2024 Negative  Negative Final    Blood, UA 03/13/2024 2+ (A)  Negative Final    Lot Number 03/13/2024 [id number]   Final    Expiration Date 03/13/2024 102,024   Final   Admission on 03/06/2024, Discharged on 03/06/2024   Component Date Value Ref Range Status    Glucose 03/06/2024 110 (H)  65 - 99 mg/dL Final    BUN 03/06/2024 6  6 - 20 mg/dL Final    Creatinine 03/06/2024 0.73  0.57 - 1.00 mg/dL Final    Sodium 03/06/2024 138  136 - 145 mmol/L Final    Potassium 03/06/2024 4.4  3.5 - 5.2 mmol/L Final    Slight hemolysis detected by analyzer. Result may be falsely elevated.    Chloride 03/06/2024 105  98 - 107 mmol/L Final    CO2 03/06/2024 21.4 (L)  22.0 - 29.0 mmol/L Final    Calcium 03/06/2024 9.3  8.6 - 10.5 mg/dL Final    Total Protein 03/06/2024 7.5  6.0 - 8.5 g/dL Final    Albumin 03/06/2024 4.3  3.5 - 5.2 g/dL Final    ALT (SGPT) 03/06/2024 11  1 - 33 U/L Final    AST (SGOT) 03/06/2024 14  1 - 32 U/L Final    Alkaline Phosphatase 03/06/2024 95  39 - 117 U/L Final    Total Bilirubin 03/06/2024 0.2  0.0 - 1.2 mg/dL Final    Globulin 03/06/2024 3.2  gm/dL Final    A/G Ratio 03/06/2024 1.3  g/dL Final    BUN/Creatinine Ratio 03/06/2024 8.2  7.0 - 25.0 Final    Anion Gap 03/06/2024 11.6  5.0 - 15.0 mmol/L Final    eGFR 03/06/2024 96.7  >60.0 mL/min/1.73 Final    Color, UA 03/06/2024 Yellow  Yellow, Straw Final    Appearance, UA 03/06/2024 Clear  Clear Final    pH, UA 03/06/2024 6.0  5.0 - 8.0 Final    Specific  Gravity, UA 03/06/2024 1.009  1.005 - 1.030 Final    Glucose, UA 03/06/2024 Negative  Negative Final    Ketones, UA 03/06/2024 Negative  Negative Final    Bilirubin, UA 03/06/2024 Negative  Negative Final    Blood, UA 03/06/2024 Moderate (2+) (A)  Negative Final    Protein, UA 03/06/2024 Negative  Negative Final    Leuk Esterase, UA 03/06/2024 Trace (A)  Negative Final    Nitrite, UA 03/06/2024 Negative  Negative Final    Urobilinogen, UA 03/06/2024 0.2 E.U./dL  0.2 - 1.0 E.U./dL Final    WBC 03/06/2024 9.34  3.40 - 10.80 10*3/mm3 Final    RBC 03/06/2024 4.02  3.77 - 5.28 10*6/mm3 Final    Hemoglobin 03/06/2024 14.0  12.0 - 15.9 g/dL Final    Hematocrit 03/06/2024 42.5  34.0 - 46.6 % Final    MCV 03/06/2024 105.7 (H)  79.0 - 97.0 fL Final    MCH 03/06/2024 34.8 (H)  26.6 - 33.0 pg Final    MCHC 03/06/2024 32.9  31.5 - 35.7 g/dL Final    RDW 03/06/2024 12.7  12.3 - 15.4 % Final    RDW-SD 03/06/2024 49.8  37.0 - 54.0 fl Final    MPV 03/06/2024 9.2  6.0 - 12.0 fL Final    Platelets 03/06/2024 310  140 - 450 10*3/mm3 Final    Neutrophil % 03/06/2024 45.5  42.7 - 76.0 % Final    Lymphocyte % 03/06/2024 46.5 (H)  19.6 - 45.3 % Final    Monocyte % 03/06/2024 5.7  5.0 - 12.0 % Final    Eosinophil % 03/06/2024 1.0  0.3 - 6.2 % Final    Basophil % 03/06/2024 1.0  0.0 - 1.5 % Final    Immature Grans % 03/06/2024 0.3  0.0 - 0.5 % Final    Neutrophils, Absolute 03/06/2024 4.26  1.70 - 7.00 10*3/mm3 Final    Lymphocytes, Absolute 03/06/2024 4.34 (H)  0.70 - 3.10 10*3/mm3 Final    Monocytes, Absolute 03/06/2024 0.53  0.10 - 0.90 10*3/mm3 Final    Eosinophils, Absolute 03/06/2024 0.09  0.00 - 0.40 10*3/mm3 Final    Basophils, Absolute 03/06/2024 0.09  0.00 - 0.20 10*3/mm3 Final    Immature Grans, Absolute 03/06/2024 0.03  0.00 - 0.05 10*3/mm3 Final    nRBC 03/06/2024 0.0  0.0 - 0.2 /100 WBC Final    Extra Tube 03/06/2024 Hold for add-ons.   Final    Auto resulted.    Extra Tube 03/06/2024 hold for add-on   Final    Auto resulted     Extra Tube 03/06/2024 Hold for add-ons.   Final    Auto resulted.    Extra Tube 03/06/2024 Hold for add-ons.   Final    Auto resulted    RBC, UA 03/06/2024 6-10 (A)  None Seen, 0-2 /HPF Final    WBC, UA 03/06/2024 6-10 (A)  None Seen, 0-2 /HPF Final    Bacteria, UA 03/06/2024 None Seen  None Seen /HPF Final    Squamous Epithelial Cells, UA 03/06/2024 0-2  None Seen, 0-2 /HPF Final    Hyaline Casts, UA 03/06/2024 None Seen  None Seen /LPF Final    Methodology 03/06/2024 Automated Microscopy   Final    Urine Culture 03/06/2024 No growth   Final   Admission on 02/01/2024, Discharged on 02/01/2024   Component Date Value Ref Range Status    Color, UA 02/01/2024 Yellow  Yellow, Straw Final    Appearance, UA 02/01/2024 Cloudy (A)  Clear Final    pH, UA 02/01/2024 6.0  5.0 - 8.0 Final    Specific Gravity, UA 02/01/2024 1.008  1.005 - 1.030 Final    Glucose, UA 02/01/2024 Negative  Negative Final    Ketones, UA 02/01/2024 Negative  Negative Final    Bilirubin, UA 02/01/2024 Negative  Negative Final    Blood, UA 02/01/2024 Moderate (2+) (A)  Negative Final    Protein, UA 02/01/2024 Trace (A)  Negative Final    Leuk Esterase, UA 02/01/2024 Large (3+) (A)  Negative Final    Nitrite, UA 02/01/2024 Negative  Negative Final    Urobilinogen, UA 02/01/2024 0.2 E.U./dL  0.2 - 1.0 E.U./dL Final    RBC, UA 02/01/2024 3-5 (A)  None Seen, 0-2 /HPF Final    WBC, UA 02/01/2024 Too Numerous to Count (A)  None Seen, 0-2 /HPF Final    Bacteria, UA 02/01/2024 Trace (A)  None Seen /HPF Final    Squamous Epithelial Cells, UA 02/01/2024 0-2  None Seen, 0-2 /HPF Final    Hyaline Casts, UA 02/01/2024 None Seen  None Seen /LPF Final    Methodology 02/01/2024 Manual Light Microscopy   Final    Glucose 02/01/2024 115 (H)  65 - 99 mg/dL Final    BUN 02/01/2024 5 (L)  6 - 20 mg/dL Final    Creatinine 02/01/2024 0.62  0.57 - 1.00 mg/dL Final    Sodium 02/01/2024 139  136 - 145 mmol/L Final    Potassium 02/01/2024 4.1  3.5 - 5.2 mmol/L Final    Chloride  02/01/2024 103  98 - 107 mmol/L Final    CO2 02/01/2024 24.0  22.0 - 29.0 mmol/L Final    Calcium 02/01/2024 9.3  8.6 - 10.5 mg/dL Final    Total Protein 02/01/2024 7.4  6.0 - 8.5 g/dL Final    Albumin 02/01/2024 4.4  3.5 - 5.2 g/dL Final    ALT (SGPT) 02/01/2024 23  1 - 33 U/L Final    AST (SGOT) 02/01/2024 19  1 - 32 U/L Final    Alkaline Phosphatase 02/01/2024 103  39 - 117 U/L Final    Total Bilirubin 02/01/2024 0.3  0.0 - 1.2 mg/dL Final    Globulin 02/01/2024 3.0  gm/dL Final    A/G Ratio 02/01/2024 1.5  g/dL Final    BUN/Creatinine Ratio 02/01/2024 8.1  7.0 - 25.0 Final    Anion Gap 02/01/2024 12.0  5.0 - 15.0 mmol/L Final    eGFR 02/01/2024 104.7  >60.0 mL/min/1.73 Final    C-Reactive Protein 02/01/2024 1.13 (H)  0.00 - 0.50 mg/dL Final    Procalcitonin 02/01/2024 0.05  0.00 - 0.25 ng/mL Final    Extra Tube 02/01/2024 Hold for add-ons.   Final    Auto resulted.    Extra Tube 02/01/2024 hold for add-on   Final    Auto resulted    Extra Tube 02/01/2024 Hold for add-ons.   Final    Auto resulted.    Extra Tube 02/01/2024 Hold for add-ons.   Final    Auto resulted    WBC 02/01/2024 11.48 (H)  3.40 - 10.80 10*3/mm3 Final    RBC 02/01/2024 4.17  3.77 - 5.28 10*6/mm3 Final    Hemoglobin 02/01/2024 14.5  12.0 - 15.9 g/dL Final    Hematocrit 02/01/2024 43.8  34.0 - 46.6 % Final    MCV 02/01/2024 105.0 (H)  79.0 - 97.0 fL Final    MCH 02/01/2024 34.8 (H)  26.6 - 33.0 pg Final    MCHC 02/01/2024 33.1  31.5 - 35.7 g/dL Final    RDW 02/01/2024 12.3  12.3 - 15.4 % Final    RDW-SD 02/01/2024 48.2  37.0 - 54.0 fl Final    MPV 02/01/2024 9.2  6.0 - 12.0 fL Final    Platelets 02/01/2024 275  140 - 450 10*3/mm3 Final    Neutrophil % 02/01/2024 71.1  42.7 - 76.0 % Final    Lymphocyte % 02/01/2024 20.4  19.6 - 45.3 % Final    Monocyte % 02/01/2024 7.1  5.0 - 12.0 % Final    Eosinophil % 02/01/2024 0.3  0.3 - 6.2 % Final    Basophil % 02/01/2024 0.7  0.0 - 1.5 % Final    Immature Grans % 02/01/2024 0.4  0.0 - 0.5 % Final     Neutrophils, Absolute 02/01/2024 8.16 (H)  1.70 - 7.00 10*3/mm3 Final    Lymphocytes, Absolute 02/01/2024 2.34  0.70 - 3.10 10*3/mm3 Final    Monocytes, Absolute 02/01/2024 0.82  0.10 - 0.90 10*3/mm3 Final    Eosinophils, Absolute 02/01/2024 0.03  0.00 - 0.40 10*3/mm3 Final    Basophils, Absolute 02/01/2024 0.08  0.00 - 0.20 10*3/mm3 Final    Immature Grans, Absolute 02/01/2024 0.05  0.00 - 0.05 10*3/mm3 Final    nRBC 02/01/2024 0.0  0.0 - 0.2 /100 WBC Final    Extra Tube 02/01/2024 Hold for add-ons.   Final    Auto resulted.    Blood Culture 02/01/2024 No growth at 5 days   Final    Blood Culture 02/01/2024 No growth at 5 days   Final    Lactate 02/01/2024 0.9  0.5 - 2.0 mmol/L Final    Urine Culture 02/01/2024 No growth   Final        Procedure: None  Procedures     I have reviewed and agree with the above PMH, PSH, FMH, social history, medications, allergies, and labs.     Assessment/Plan:   Problem List Items Addressed This Visit          Genitourinary and Reproductive     Other microscopic hematuria - Primary    Relevant Orders    POC Urinalysis Dipstick, Automated (Completed)    Urine Culture - Urine, Urine, Clean Catch     Other Visit Diagnoses       Pelvic pain        Relevant Orders    US Pelvis Complete            Health Maintenance:   Health Maintenance Due   Topic Date Due    COLORECTAL CANCER SCREENING  Never done    Pneumococcal Vaccine 0-64 (1 of 2 - PCV) Never done    TDAP/TD VACCINES (1 - Tdap) Never done    ZOSTER VACCINE (1 of 2) Never done    MAMMOGRAM  06/06/2019    HEPATITIS C SCREENING  Never done    ANNUAL PHYSICAL  Never done    PAP SMEAR  Never done    INFLUENZA VACCINE  Never done    COVID-19 Vaccine (2 - 2023-24 season) 09/01/2023    LIPID PANEL  09/29/2023        Smoking Counseling: Everyday smoker.  Never used smokeless tobacco.  Counseling given however not ready to quit at this time.    Urine Incontinence: Patient reports that she is not currently experiencing any symptoms of urinary  incontinence.    Patient was given instructions and counseling regarding her condition or for health maintenance advice. Please see specific information pulled into the AVS if appropriate.    Patient Education:   Microscopic hematuria -discussed with the patient the pathophysiology of this condition in detail.  I discussed causes which can include but are not limited to glomerulonephritis, autoimmune disorders, acute cystitis, interstitial cystitis, nephrolithiasis, bladder diverticula, renal cell carcinoma, and bladder tumor.  Patient has had recent CT scan abdomen pelvis with contrast that showed no abnormalities of the kidneys, ureter, or bladder.  Did discuss with the patient the use of a cystoscopy in office to rule out any abnormalities of the bladder such as tumors.  Advised patient given history of smoking this does increase her risk for bladder carcinoma.  Discussed the procedure in detail.  Patient wishes to hold off on cystoscopy at this point.  I will send the patient's urine off for culture and call with results once available.  Recommend to start a daily probiotic to help with growth control of normal urogenital dory.  Pelvic pain -discussed with the patient the pathophysiology of pelvic pain.  Discussed with the patient most recent CT scan revealing no acute abnormalities.  Did advise patient she does have a possible right ovarian/uterine fibroid.  Discussed the pathophysiology of this condition in detail.  Patient wishes to get a pelvic ultrasound completed for evaluation.  I will place an order for this and call patient with results once available.  Otherwise we will see the patient back in roughly 1 month for reevaluation    Visit Diagnoses:    ICD-10-CM ICD-9-CM   1. Other microscopic hematuria  R31.29 599.72   2. Pelvic pain  R10.2 [id number]       Meds Ordered During Visit:  No orders of the defined types were placed in this encounter.      Follow Up Appointment: 1 month  No follow-ups on  file.      This document has been electronically signed by Sanchez Larry PA-C   March 13, 2024 13:52 EDT    Part of this note may be an electronic transcription/translation of spoken language to printed text using the Dragon Dictation System.

## 2024-03-15 ENCOUNTER — TELEPHONE (OUTPATIENT)
Dept: UROLOGY | Facility: CLINIC | Age: 59
End: 2024-03-15
Payer: COMMERCIAL

## 2024-03-15 LAB — BACTERIA SPEC AEROBE CULT: NO GROWTH

## 2024-03-19 ENCOUNTER — HOSPITAL ENCOUNTER (OUTPATIENT)
Facility: HOSPITAL | Age: 59
Discharge: HOME | End: 2024-03-19
Payer: COMMERCIAL

## 2024-03-19 DIAGNOSIS — R10.2 PELVIC PAIN: ICD-10-CM

## 2024-03-19 PROCEDURE — 76856 US EXAM PELVIC COMPLETE: CPT

## 2024-03-20 ENCOUNTER — TELEPHONE (OUTPATIENT)
Dept: UROLOGY | Facility: CLINIC | Age: 59
End: 2024-03-20
Payer: COMMERCIAL

## 2024-03-20 PROCEDURE — 76856 US EXAM PELVIC COMPLETE: CPT | Performed by: RADIOLOGY

## 2024-03-20 NOTE — TELEPHONE ENCOUNTER
----- Message from Sanchez Larry PA-C sent at 3/20/2024 11:26 AM EDT -----  Regarding: Pelvic US  Please let patient know that her pelvic ultrasound came back normal.  Uterus was unremarkable.  The ovaries could not be visualized.  Otherwise no other abnormalities.  If she is still having significant pain in the right leg I recommend following up with her primary care provider soon as possible.  Thanks.

## 2024-03-20 NOTE — TELEPHONE ENCOUNTER
Called patient to let her know her pelvic us was normal per janelle. Patient verbalized understanding.

## 2024-03-21 ENCOUNTER — TELEPHONE (OUTPATIENT)
Dept: INFUSION THERAPY | Facility: HOSPITAL | Age: 59
End: 2024-03-21
Payer: COMMERCIAL

## 2024-03-21 NOTE — TELEPHONE ENCOUNTER
Pt called to remind her of her CTA coronary scheduled for 3/25/24, she stated she needed to reschedule. Pt transferred to scheduling.

## 2024-03-23 ENCOUNTER — APPOINTMENT (OUTPATIENT)
Dept: ULTRASOUND IMAGING | Facility: HOSPITAL | Age: 59
End: 2024-03-23
Payer: COMMERCIAL

## 2024-03-23 ENCOUNTER — APPOINTMENT (OUTPATIENT)
Dept: GENERAL RADIOLOGY | Facility: HOSPITAL | Age: 59
End: 2024-03-23
Payer: COMMERCIAL

## 2024-03-23 ENCOUNTER — HOSPITAL ENCOUNTER (EMERGENCY)
Facility: HOSPITAL | Age: 59
Discharge: HOME | End: 2024-03-23
Attending: EMERGENCY MEDICINE
Payer: COMMERCIAL

## 2024-03-23 VITALS
HEART RATE: 88 BPM | BODY MASS INDEX: 23.32 KG/M2 | SYSTOLIC BLOOD PRESSURE: 138 MMHG | OXYGEN SATURATION: 99 % | RESPIRATION RATE: 16 BRPM | DIASTOLIC BLOOD PRESSURE: 74 MMHG | TEMPERATURE: 98 F | WEIGHT: 140 LBS | HEIGHT: 65 IN

## 2024-03-23 DIAGNOSIS — M25.551 RIGHT HIP PAIN: Primary | ICD-10-CM

## 2024-03-23 LAB
ALBUMIN SERPL-MCNC: 4.7 G/DL (ref 3.5–5.2)
ALBUMIN/GLOB SERPL: 1.6 G/DL
ALP SERPL-CCNC: 95 U/L (ref 39–117)
ALT SERPL W P-5'-P-CCNC: 16 U/L (ref 1–33)
ANION GAP SERPL CALCULATED.3IONS-SCNC: 12.2 MMOL/L (ref 5–15)
AST SERPL-CCNC: 14 U/L (ref 1–32)
BACTERIA UR QL AUTO: ABNORMAL /HPF
BASOPHILS # BLD AUTO: 0.09 10*3/MM3 (ref 0–0.2)
BASOPHILS NFR BLD AUTO: 0.8 % (ref 0–1.5)
BILIRUB SERPL-MCNC: 0.3 MG/DL (ref 0–1.2)
BILIRUB UR QL STRIP: NEGATIVE
BUN SERPL-MCNC: 7 MG/DL (ref 6–20)
BUN/CREAT SERPL: 11.9 (ref 7–25)
CALCIUM SPEC-SCNC: 9.7 MG/DL (ref 8.6–10.5)
CHLORIDE SERPL-SCNC: 107 MMOL/L (ref 98–107)
CLARITY UR: ABNORMAL
CO2 SERPL-SCNC: 24.8 MMOL/L (ref 22–29)
COLOR UR: YELLOW
CREAT SERPL-MCNC: 0.59 MG/DL (ref 0.57–1)
DEPRECATED RDW RBC AUTO: 48.7 FL (ref 37–54)
EGFRCR SERPLBLD CKD-EPI 2021: 105.9 ML/MIN/1.73
EOSINOPHIL # BLD AUTO: 0.09 10*3/MM3 (ref 0–0.4)
EOSINOPHIL NFR BLD AUTO: 0.8 % (ref 0.3–6.2)
ERYTHROCYTE [DISTWIDTH] IN BLOOD BY AUTOMATED COUNT: 12.5 % (ref 12.3–15.4)
GLOBULIN UR ELPH-MCNC: 3 GM/DL
GLUCOSE SERPL-MCNC: 110 MG/DL (ref 65–99)
GLUCOSE UR STRIP-MCNC: NEGATIVE MG/DL
HCT VFR BLD AUTO: 44.2 % (ref 34–46.6)
HGB BLD-MCNC: 14.7 G/DL (ref 12–15.9)
HGB UR QL STRIP.AUTO: ABNORMAL
HOLD SPECIMEN: NORMAL
HYALINE CASTS UR QL AUTO: ABNORMAL /LPF
IMM GRANULOCYTES # BLD AUTO: 0.03 10*3/MM3 (ref 0–0.05)
IMM GRANULOCYTES NFR BLD AUTO: 0.3 % (ref 0–0.5)
KETONES UR QL STRIP: NEGATIVE
LEUKOCYTE ESTERASE UR QL STRIP.AUTO: ABNORMAL
LYMPHOCYTES # BLD AUTO: 4.48 10*3/MM3 (ref 0.7–3.1)
LYMPHOCYTES NFR BLD AUTO: 41.7 % (ref 19.6–45.3)
MCH RBC QN AUTO: 34.8 PG (ref 26.6–33)
MCHC RBC AUTO-ENTMCNC: 33.3 G/DL (ref 31.5–35.7)
MCV RBC AUTO: 104.5 FL (ref 79–97)
MONOCYTES # BLD AUTO: 0.57 10*3/MM3 (ref 0.1–0.9)
MONOCYTES NFR BLD AUTO: 5.3 % (ref 5–12)
NEUTROPHILS NFR BLD AUTO: 5.49 10*3/MM3 (ref 1.7–7)
NEUTROPHILS NFR BLD AUTO: 51.1 % (ref 42.7–76)
NITRITE UR QL STRIP: NEGATIVE
NRBC BLD AUTO-RTO: 0 /100 WBC (ref 0–0.2)
PH UR STRIP.AUTO: 6 [PH] (ref 5–8)
PLATELET # BLD AUTO: 360 10*3/MM3 (ref 140–450)
PMV BLD AUTO: 9 FL (ref 6–12)
POTASSIUM SERPL-SCNC: 4.1 MMOL/L (ref 3.5–5.2)
PROT SERPL-MCNC: 7.7 G/DL (ref 6–8.5)
PROT UR QL STRIP: NEGATIVE
RBC # BLD AUTO: 4.23 10*6/MM3 (ref 3.77–5.28)
RBC # UR STRIP: ABNORMAL /HPF
REF LAB TEST METHOD: ABNORMAL
SODIUM SERPL-SCNC: 144 MMOL/L (ref 136–145)
SP GR UR STRIP: 1.01 (ref 1–1.03)
SQUAMOUS #/AREA URNS HPF: ABNORMAL /HPF
UROBILINOGEN UR QL STRIP: ABNORMAL
WBC # UR STRIP: ABNORMAL /HPF
WBC NRBC COR # BLD AUTO: 10.75 10*3/MM3 (ref 3.4–10.8)
WHOLE BLOOD HOLD COAG: NORMAL
WHOLE BLOOD HOLD SPECIMEN: NORMAL

## 2024-03-23 PROCEDURE — 80053 COMPREHEN METABOLIC PANEL: CPT | Performed by: PHYSICIAN ASSISTANT

## 2024-03-23 PROCEDURE — 73552 X-RAY EXAM OF FEMUR 2/>: CPT | Performed by: RADIOLOGY

## 2024-03-23 PROCEDURE — 81001 URINALYSIS AUTO W/SCOPE: CPT | Performed by: PHYSICIAN ASSISTANT

## 2024-03-23 PROCEDURE — 93971 EXTREMITY STUDY: CPT | Performed by: RADIOLOGY

## 2024-03-23 PROCEDURE — 93971 EXTREMITY STUDY: CPT

## 2024-03-23 PROCEDURE — 73502 X-RAY EXAM HIP UNI 2-3 VIEWS: CPT

## 2024-03-23 PROCEDURE — 99284 EMERGENCY DEPT VISIT MOD MDM: CPT

## 2024-03-23 PROCEDURE — 73552 X-RAY EXAM OF FEMUR 2/>: CPT

## 2024-03-23 PROCEDURE — 85025 COMPLETE CBC W/AUTO DIFF WBC: CPT | Performed by: PHYSICIAN ASSISTANT

## 2024-03-23 PROCEDURE — 73502 X-RAY EXAM HIP UNI 2-3 VIEWS: CPT | Performed by: RADIOLOGY

## 2024-03-23 PROCEDURE — 36415 COLL VENOUS BLD VENIPUNCTURE: CPT

## 2024-03-23 RX ORDER — DICLOFENAC SODIUM 75 MG/1
75 TABLET, DELAYED RELEASE ORAL 2 TIMES DAILY PRN
Qty: 12 TABLET | Refills: 0 | Status: SHIPPED | OUTPATIENT
Start: 2024-03-23

## 2024-03-23 NOTE — DISCHARGE INSTRUCTIONS
Call one of the offices below to establish a primary care provider.  If you are unable to get an appointment and feel it is an emergency and need to be seen immediately please return to the Emergency Department    Call one of the officee below to set up a primary care provider.       Dr. Marr  [address]  [place] [id number] [phone]    [facility] ([facility])  [address]  [facility] [address] [phone]    [facility]                                                                                                       [address] [facility] [facility] [id number] [phone]    [facility])  [address]   [address]  [place] [address]  [phone]    [facility])  [address]  [place] [address]  Phone: [phone]    [facility]  [address] [place] [id number] [phone]    [facility])  [address] [place] [address]  Phone: [phone]    Dr. Maritza Briscoe  [address]  [facility] [place] [id number] [phone]    [place] [address]  [phone]    [facility]  Cici Padillas, APRN  [address] [place] [address]  [phone]    Dr. Mcrae and [facility]   [address]  [address]  [place] [address]  [phone]

## 2024-03-23 NOTE — ED PROVIDER NOTES
"Subjective   History of Present Illness  59 yo female pt with hx of COPD, HLD, migraines, anxiety, and arthritis presents to the ED with complaints of right hip and pelvic pain that is radiating to the right upper leg. Pt states this pain started two weeks ago when getting up from a sitting position. States she felt a pop. Pt states she has been seen for this 4 other times. Pt states that she had abdominal CT scans to look for the cause of the pain but no one can tell her anything. Pt states she has not gotten any better. Pt denies any abd pain, n/v/d, numbness, tingling, or incontinence. Pt states her pain is worsened with ambulation and movement. Pt states she has been using a cane and wheelchair since the \"popping\" incident.  Pt states she also wants to be checked for a blood clot in that leg.      History provided by:  Patient   used: No        Review of Systems   Constitutional: Negative.    HENT: Negative.     Eyes: Negative.    Respiratory: Negative.     Cardiovascular: Negative.    Gastrointestinal: Negative.    Endocrine: Negative.    Genitourinary: Negative.    Musculoskeletal:  Positive for back pain.   Skin: Negative.    Allergic/Immunologic: Negative.    Neurological: Negative.    Hematological: Negative.    Psychiatric/Behavioral: Negative.     All other systems reviewed and are negative.      Past Medical History:   Diagnosis Date    Anxiety 06/25/2018    Arthritis     COPD (chronic obstructive pulmonary disease)     Elevated cholesterol     Fibromyalgia     Migraines     RSD (reflex sympathetic dystrophy)        Allergies   Allergen Reactions    Floxin [Ofloxacin]        No past surgical history on file.    Family History   Problem Relation Age of Onset    Hypertension Mother     Hypertension Father     Heart disease Sister     Diabetes Sister     Breast cancer Neg Hx        Social History     Socioeconomic History    Marital status:    Tobacco Use    Smoking status: Every " Day     Current packs/day: 0.50     Types: Cigarettes    Smokeless tobacco: Never    Tobacco comments:     smoked about 2 ppd for about 15 years. recently cut down to 1/2 ppd.   Vaping Use    Vaping status: Never Used   Substance and Sexual Activity    Alcohol use: No    Drug use: No    Sexual activity: Defer           Objective   Physical Exam  Vitals and nursing note reviewed.   Constitutional:       Appearance: Normal appearance. She is normal weight.   HENT:      Head: Normocephalic and atraumatic.      Right Ear: External ear normal.      Left Ear: External ear normal.      Nose: Nose normal.      Mouth/Throat:      Mouth: Mucous membranes are moist.   Eyes:      Extraocular Movements: Extraocular movements intact.      Conjunctiva/sclera: Conjunctivae normal.      Pupils: Pupils are equal, round, and reactive to light.   Cardiovascular:      Rate and Rhythm: Normal rate and regular rhythm.      Pulses: Normal pulses.      Heart sounds: Normal heart sounds.   Pulmonary:      Effort: Pulmonary effort is normal.      Breath sounds: Normal breath sounds.   Abdominal:      General: Abdomen is flat. Bowel sounds are normal.      Palpations: Abdomen is soft.   Musculoskeletal:         General: Normal range of motion.      Cervical back: Normal range of motion and neck supple.   Skin:     General: Skin is warm and dry.      Capillary Refill: Capillary refill takes less than 2 seconds.   Neurological:      General: No focal deficit present.      Mental Status: She is alert and oriented to person, place, and time. Mental status is at baseline.   Psychiatric:         Mood and Affect: Mood normal.         Behavior: Behavior normal.         Thought Content: Thought content normal.         Judgment: Judgment normal.         Procedures           ED Course  ED Course as of 03/23/24 1523   Sat Mar 23, 2024   1505 US Venous Doppler Lower Extremity Right (duplex) [ML]   1512 XR Femur 2 View Right [ML]   1512 XR Hip With or  Without Pelvis 2 - 3 View Right [ML]      ED Course User Index  [ML] Tania Collier PA                                   Results for orders placed or performed during the hospital encounter of 03/23/24   Comprehensive Metabolic Panel    Specimen: Blood   Result Value Ref Range    Glucose 110 (H) 65 - 99 mg/dL    BUN 7 6 - 20 mg/dL    Creatinine 0.59 0.57 - 1.00 mg/dL    Sodium 144 136 - 145 mmol/L    Potassium 4.1 3.5 - 5.2 mmol/L    Chloride 107 98 - 107 mmol/L    CO2 24.8 22.0 - 29.0 mmol/L    Calcium 9.7 8.6 - 10.5 mg/dL    Total Protein 7.7 6.0 - 8.5 g/dL    Albumin 4.7 3.5 - 5.2 g/dL    ALT (SGPT) 16 1 - 33 U/L    AST (SGOT) 14 1 - 32 U/L    Alkaline Phosphatase 95 39 - 117 U/L    Total Bilirubin 0.3 0.0 - 1.2 mg/dL    Globulin 3.0 gm/dL    A/G Ratio 1.6 g/dL    BUN/Creatinine Ratio 11.9 7.0 - 25.0    Anion Gap 12.2 5.0 - 15.0 mmol/L    eGFR 105.9 >60.0 mL/min/1.73   Urinalysis With Microscopic If Indicated (No Culture) - Urine, Clean Catch    Specimen: Urine, Clean Catch   Result Value Ref Range    Color, UA Yellow Yellow, Straw    Appearance, UA Cloudy (A) Clear    pH, UA 6.0 5.0 - 8.0    Specific Gravity, UA 1.006 1.005 - 1.030    Glucose, UA Negative Negative    Ketones, UA Negative Negative    Bilirubin, UA Negative Negative    Blood, UA Moderate (2+) (A) Negative    Protein, UA Negative Negative    Leuk Esterase, UA Moderate (2+) (A) Negative    Nitrite, UA Negative Negative    Urobilinogen, UA 0.2 E.U./dL 0.2 - 1.0 E.U./dL   CBC Auto Differential    Specimen: Blood   Result Value Ref Range    WBC 10.75 3.40 - 10.80 10*3/mm3    RBC 4.23 3.77 - 5.28 10*6/mm3    Hemoglobin 14.7 12.0 - 15.9 g/dL    Hematocrit 44.2 34.0 - 46.6 %    .5 (H) 79.0 - 97.0 fL    MCH 34.8 (H) 26.6 - 33.0 pg    MCHC 33.3 31.5 - 35.7 g/dL    RDW 12.5 12.3 - 15.4 %    RDW-SD 48.7 37.0 - 54.0 fl    MPV 9.0 6.0 - 12.0 fL    Platelets 360 140 - 450 10*3/mm3    Neutrophil % 51.1 42.7 - 76.0 %    Lymphocyte % 41.7 19.6 - 45.3  %    Monocyte % 5.3 5.0 - 12.0 %    Eosinophil % 0.8 0.3 - 6.2 %    Basophil % 0.8 0.0 - 1.5 %    Immature Grans % 0.3 0.0 - 0.5 %    Neutrophils, Absolute 5.49 1.70 - 7.00 10*3/mm3    Lymphocytes, Absolute 4.48 (H) 0.70 - 3.10 10*3/mm3    Monocytes, Absolute 0.57 0.10 - 0.90 10*3/mm3    Eosinophils, Absolute 0.09 0.00 - 0.40 10*3/mm3    Basophils, Absolute 0.09 0.00 - 0.20 10*3/mm3    Immature Grans, Absolute 0.03 0.00 - 0.05 10*3/mm3    nRBC 0.0 0.0 - 0.2 /100 WBC   Urinalysis, Microscopic Only - Urine, Clean Catch    Specimen: Urine, Clean Catch   Result Value Ref Range    RBC, UA 3-5 (A) None Seen, 0-2 /HPF    WBC, UA 21-50 (A) None Seen, 0-2 /HPF    Bacteria, UA None Seen None Seen /HPF    Squamous Epithelial Cells, UA 0-2 None Seen, 0-2 /HPF    Hyaline Casts, UA None Seen None Seen /LPF    Methodology Automated Microscopy    [place] Urine Culture Tube - Urine, Clean Catch    Specimen: Urine, Clean Catch   Result Value Ref Range    Extra Tube Hold for add-ons.    Green Top (Gel)   Result Value Ref Range    Extra Tube Hold for add-ons.    Lavender Top   Result Value Ref Range    Extra Tube hold for add-on    Gold Top - SST   Result Value Ref Range    Extra Tube Hold for add-ons.    Light Blue Top   Result Value Ref Range    Extra Tube Hold for add-ons.      XR Femur 2 View Right    Result Date: 3/23/2024   1.  No right femur fracture.    To TALK to On Call [phone]  This report was finalized on 3/23/2024 3:05 PM by Gio Rodriguez MD.      XR Hip With or Without Pelvis 2 - 3 View Right    Result Date: 3/23/2024   1. No fracture or malalignment.    To TALK to On Call [phone]  This report was finalized on 3/23/2024 3:05 PM by Gio Rodriguez MD.      US Venous Doppler Lower Extremity Right (duplex)    Result Date: 3/23/2024   1. Negative for deep venous thrombosis in the right lower extremity.     To TALK to On Call [phone]  This report was finalized on 3/23/2024  "2:58 PM by Gio Rodriguez MD.               Medical Decision Making  57 yo female pt with hx of COPD, HLD, migraines, anxiety, and arthritis presents to the ED with complaints of right hip and pelvic pain that is radiating to the right upper leg. Pt states this pain started two weeks ago when getting up from a sitting position. States she felt a pop. Pt states she has been seen for this 4 other times. Pt states that she had abdominal CT scans to look for the cause of the pain but no one can tell her anything. Pt states she has not gotten any better. Pt denies any abd pain, n/v/d, numbness, tingling, or incontinence. Pt states her pain is worsened with ambulation and movement. Pt states she has been using a cane and wheelchair since the \"popping\" incident.  Pt states she also wants to be checked for a blood clot in that leg.  ED states been complicated.  Imaging is unremarkable for acute findings.  She will follow-up with her primary care provider and orthopedics.  I discussed symptoms and red flags with her that would warrant return to the emergency room.    Amount and/or Complexity of Data Reviewed  Labs: ordered. Decision-making details documented in ED Course.  Radiology: ordered. Decision-making details documented in ED Course.        Final diagnoses:   Right hip pain       ED Disposition  ED Disposition       ED Disposition   Discharge    Condition   Stable    Comment   --               Yvonne Marr MD  [address]  [place] [id number] [phone]    Schedule an appointment as soon as possible for a visit in 2 days      Jovon Ruiz,   [address] [place] [id number] [phone]    Schedule an appointment as soon as possible for a visit in 2 days           Medication List        New Prescriptions      diclofenac 75 MG EC tablet  Commonly known as: VOLTAREN  Take 1 tablet by mouth 2 (Two) Times a Day As Needed (pain).               Where to Get Your Medications        You can get " these medications from any pharmacy    Bring a paper prescription for each of these medications  diclofenac 75 MG EC tablet            Tania Collier PA  03/23/24 2071

## 2024-04-18 ENCOUNTER — HOSPITAL ENCOUNTER (OUTPATIENT)
Facility: HOSPITAL | Age: 59
Discharge: HOME | End: 2024-04-18
Admitting: INTERNAL MEDICINE
Payer: COMMERCIAL

## 2024-04-18 ENCOUNTER — OFFICE VISIT (OUTPATIENT)
Dept: UROLOGY | Facility: CLINIC | Age: 59
End: 2024-04-18
Payer: COMMERCIAL

## 2024-04-18 ENCOUNTER — TRANSCRIBE ORDERS (OUTPATIENT)
Facility: HOSPITAL | Age: 59
End: 2024-04-18
Payer: COMMERCIAL

## 2024-04-18 DIAGNOSIS — R10.2 PELVIC PAIN: ICD-10-CM

## 2024-04-18 DIAGNOSIS — R31.29 OTHER MICROSCOPIC HEMATURIA: Primary | ICD-10-CM

## 2024-04-18 DIAGNOSIS — B37.0 THRUSH: ICD-10-CM

## 2024-04-18 DIAGNOSIS — R04.2 COUGH WITH HEMOPTYSIS: Primary | ICD-10-CM

## 2024-04-18 DIAGNOSIS — R04.2 COUGH WITH HEMOPTYSIS: ICD-10-CM

## 2024-04-18 LAB
BILIRUB BLD-MCNC: NEGATIVE MG/DL
CLARITY, POC: CLEAR
COLOR UR: YELLOW
EXPIRATION DATE: ABNORMAL
GLUCOSE UR STRIP-MCNC: NEGATIVE MG/DL
KETONES UR QL: NEGATIVE
LEUKOCYTE EST, POC: ABNORMAL
Lab: ABNORMAL
NITRITE UR-MCNC: NEGATIVE MG/ML
PH UR: 5.5 [PH] (ref 5–8)
PROT UR STRIP-MCNC: NEGATIVE MG/DL
RBC # UR STRIP: ABNORMAL /UL
SP GR UR: 1.01 (ref 1–1.03)
UROBILINOGEN UR QL: NORMAL

## 2024-04-18 PROCEDURE — 71046 X-RAY EXAM CHEST 2 VIEWS: CPT

## 2024-04-18 PROCEDURE — 87086 URINE CULTURE/COLONY COUNT: CPT

## 2024-04-18 NOTE — PROGRESS NOTES
Chief Complaint: Microscopic hematuria  No chief complaint on file.      Vital Signs:   There were no vitals taken for this visit.  There is no height or weight on file to calculate BMI.      HPI:  Basilia Chamberlain is a 58 y.o. female who presents today for follow up    History of Present Illness  Ms. Chamberlain presents to the clinic for a follow-up for right flank/hip pain, microscopic hematuria, and pelvic pain.  She was last seen in office roughly 1 month ago and had a urine analysis completed at that time that showed 2+ leukocytes and 2+ blood.  She did have a CT scan of the abdomen pelvis completed prior to visit that showed no obvious source of microscopic hematuria.  She had no significant bladder wall thickening, obstructive uropathy, or masses identified in the ureters, kidney, or bladder.  She does note persistent pelvic pain and right flank pain.  Proceed forward with a ultrasound of the pelvis that identified a normal uterus and endometrium.  Bladder was unremarkable as well.  Bilateral ovaries were not visualized.  Since last office visit patient has had an increase in right flank and hip pain.  She went back to the emergency department and had an x-ray of her hip and femur completed that were both unremarkable other than a moderate amount of right-sided hip arthritis.  No acute fractures or malalignment were noted.  She states that symptoms have worsened and she is now currently using a wheelchair for ambulation.  Patient's urine analysis in office today shows 3+ blood and 1+ leukocytes.  She denies any significant dysuria, back pain, frequency, or urgency.  She still has some intermittent pelvic pain.  She has been on several rounds of antibiotics with multiple episodes of thrush and vaginal yeast.      Past Medical History:  Past Medical History:   Diagnosis Date    Anxiety 06/25/2018    Arthritis     COPD (chronic obstructive pulmonary disease)     Elevated cholesterol     Fibromyalgia      Migraines     RSD (reflex sympathetic dystrophy)        Current Meds:  Current Outpatient Medications   Medication Sig Dispense Refill    acetaminophen (TYLENOL) 500 MG tablet Take 1 tablet by mouth Every 6 (Six) Hours As Needed for Mild Pain.      albuterol sulfate  (90 Base) MCG/ACT inhaler Inhale 2 puffs Every 4 (Four) Hours As Needed for Wheezing. 18 g 5    Allergy Relief 10 MG tablet       ALPRAZolam (XANAX) 2 MG tablet Take 1 tablet by mouth.      amitriptyline (ELAVIL) 25 MG tablet       aspirin 81 MG EC tablet Take 1 tablet by mouth Daily. 90 tablet 1    atorvastatin (LIPITOR) 20 MG tablet Take 1 tablet by mouth Daily. 90 tablet 1    cefdinir (OMNICEF) 300 MG capsule Take 1 capsule by mouth 2 (Two) Times a Day. 14 capsule 0    diclofenac (VOLTAREN) 75 MG EC tablet Take 1 tablet by mouth 2 (Two) Times a Day As Needed (pain). 12 tablet 0    escitalopram (LEXAPRO) 5 MG tablet Take 1 tablet by mouth Daily.      fluticasone (FLOVENT HFA) 220 MCG/ACT inhaler Inhale 2 puffs 2 (Two) Times a Day. 12 g 5    hydroCHLOROthiazide (HYDRODIURIL) 25 MG tablet Take 0.5 tablets by mouth Daily. 45 tablet 1    ipratropium-albuterol (DUO-NEB) 0.5-2.5 mg/3 ml nebulizer Take 3 mL by nebulization 4 (Four) Times a Day As Needed for Wheezing or Shortness of Air. 120 mL 5    methocarbamol (ROBAXIN) 750 MG tablet Take 1 tablet by mouth 4 (Four) Times a Day As Needed for Muscle Spasms.      metoprolol succinate XL (TOPROL-XL) 25 MG 24 hr tablet Take 0.5 tablets by mouth 2 (Two) Times a Day. 90 tablet 1    nystatin (MYCOSTATIN) 100,000 unit/mL suspension Swish and swallow 5 mL 4 (Four) Times a Day. 60 mL 1    O2 (OXYGEN) Inhale 2 L/min Every Night.      ofloxacin (FLOXIN) 0.3 % otic solution Administer 5 drops to the right ear Daily. 5 mL 0    predniSONE (DELTASONE) 20 MG tablet Take 1 tablet by mouth Daily.      Stiolto Respimat 2.5-2.5 MCG/ACT aerosol solution inhaler Inhale 2 puffs Daily. 4 g 5    vitamin D (ERGOCALCIFEROL)  1.25 MG (50653 UT) capsule capsule Take 1 capsule by mouth 1 (One) Time Per Week.       No current facility-administered medications for this visit.        Allergies:   Allergies   Allergen Reactions    Floxin [Ofloxacin]         Past Surgical History:  No past surgical history on file.    Social History:  Social History     Socioeconomic History    Marital status:    Tobacco Use    Smoking status: Every Day     Current packs/day: 0.50     Types: Cigarettes    Smokeless tobacco: Never    Tobacco comments:     smoked about 2 ppd for about 15 years. recently cut down to 1/2 ppd.   Vaping Use    Vaping status: Never Used   Substance and Sexual Activity    Alcohol use: No    Drug use: No    Sexual activity: Defer       Family History:  Family History   Problem Relation Age of Onset    Hypertension Mother     Hypertension Father     Heart disease Sister     Diabetes Sister     Breast cancer Neg Hx        Review of Systems:  Review of Systems   Constitutional:  Negative for diaphoresis, fatigue, fever and unexpected weight change.   HENT:  Negative for dental problem, mouth sores and sneezing.    Eyes:  Negative for pain.   Respiratory:  Negative for choking, chest tightness and shortness of breath.    Cardiovascular:  Negative for chest pain and palpitations.   Gastrointestinal:  Negative for abdominal pain, constipation, diarrhea, nausea and vomiting.   Genitourinary:  Positive for flank pain, hematuria and pelvic pain. Negative for difficulty urinating, dysuria, frequency and urgency.   Musculoskeletal:  Positive for back pain.   Skin:  Negative for rash.   Neurological:  Negative for headaches.   Psychiatric/Behavioral:  Negative for agitation, confusion and suicidal ideas.        Physical Exam:  Physical Exam  Constitutional:       General: She is not in acute distress.     Appearance: Normal appearance.   HENT:      Head: Normocephalic and atraumatic.      Nose: Nose normal.      Mouth/Throat:      Mouth:  Mucous membranes are moist.   Eyes:      Conjunctiva/sclera: Conjunctivae normal.   Cardiovascular:      Rate and Rhythm: Normal rate and regular rhythm.      Pulses: Normal pulses.      Heart sounds: Normal heart sounds.   Pulmonary:      Effort: Pulmonary effort is normal.      Breath sounds: Normal breath sounds.   Abdominal:      General: Bowel sounds are normal.      Palpations: Abdomen is soft.   Musculoskeletal:      Cervical back: Normal range of motion.      Comments: Using wheelchair for ambulation   Skin:     General: Skin is warm.   Neurological:      General: No focal deficit present.      Mental Status: She is alert and oriented to person, place, and time.      Gait: Gait abnormal.   Psychiatric:         Mood and Affect: Mood normal.         Behavior: Behavior normal.         Thought Content: Thought content normal.         Judgment: Judgment normal.         Recent Image (CT and/or KUB):   CT Abdomen and Pelvis: No results found for this or any previous visit.     CT Stone Protocol: No results found for this or any previous visit.     KUB: No results found for this or any previous visit.       Labs:  Brief Urine Lab Results  (Last result in the past 365 days)        Color   Clarity   Blood   Leuk Est   Nitrite   Protein   CREAT   Urine HCG        04/18/24 1117 Yellow   Clear   3+   Small (1+)   Negative   Negative                 Office Visit on 04/18/2024   Component Date Value Ref Range Status    Color 04/18/2024 Yellow  Yellow, Straw, Dark Yellow, Danni Final    Clarity, UA 04/18/2024 Clear  Clear Final    Specific Gravity  04/18/2024 1.015  1.005 - 1.030 Final    pH, Urine 04/18/2024 5.5  5.0 - 8.0 Final    Leukocytes 04/18/2024 Small (1+) (A)  Negative Final    Nitrite, UA 04/18/2024 Negative  Negative Final    Protein, POC 04/18/2024 Negative  Negative mg/dL Final    Glucose, UA 04/18/2024 Negative  Negative mg/dL Final    Ketones, UA 04/18/2024 Negative  Negative Final    Urobilinogen, UA  04/18/2024 Normal  Normal, 0.2 E.U./dL Final    Bilirubin 04/18/2024 Negative  Negative Final    Blood, UA 04/18/2024 3+ (A)  Negative Final    Lot Number 04/18/2024 [id number]   Final    Expiration Date 04/18/2024 122,525   Final   Admission on 03/23/2024, Discharged on 03/23/2024   Component Date Value Ref Range Status    Glucose 03/23/2024 110 (H)  65 - 99 mg/dL Final    BUN 03/23/2024 7  6 - 20 mg/dL Final    Creatinine 03/23/2024 0.59  0.57 - 1.00 mg/dL Final    Sodium 03/23/2024 144  136 - 145 mmol/L Final    Potassium 03/23/2024 4.1  3.5 - 5.2 mmol/L Final    Chloride 03/23/2024 107  98 - 107 mmol/L Final    CO2 03/23/2024 24.8  22.0 - 29.0 mmol/L Final    Calcium 03/23/2024 9.7  8.6 - 10.5 mg/dL Final    Total Protein 03/23/2024 7.7  6.0 - 8.5 g/dL Final    Albumin 03/23/2024 4.7  3.5 - 5.2 g/dL Final    ALT (SGPT) 03/23/2024 16  1 - 33 U/L Final    AST (SGOT) 03/23/2024 14  1 - 32 U/L Final    Alkaline Phosphatase 03/23/2024 95  39 - 117 U/L Final    Total Bilirubin 03/23/2024 0.3  0.0 - 1.2 mg/dL Final    Globulin 03/23/2024 3.0  gm/dL Final    A/G Ratio 03/23/2024 1.6  g/dL Final    BUN/Creatinine Ratio 03/23/2024 11.9  7.0 - 25.0 Final    Anion Gap 03/23/2024 12.2  5.0 - 15.0 mmol/L Final    eGFR 03/23/2024 105.9  >60.0 mL/min/1.73 Final    Color, UA 03/23/2024 Yellow  Yellow, Straw Final    Appearance, UA 03/23/2024 Cloudy (A)  Clear Final    pH, UA 03/23/2024 6.0  5.0 - 8.0 Final    Specific Gravity, UA 03/23/2024 1.006  1.005 - 1.030 Final    Glucose, UA 03/23/2024 Negative  Negative Final    Ketones, UA 03/23/2024 Negative  Negative Final    Bilirubin, UA 03/23/2024 Negative  Negative Final    Blood, UA 03/23/2024 Moderate (2+) (A)  Negative Final    Protein, UA 03/23/2024 Negative  Negative Final    Leuk Esterase, UA 03/23/2024 Moderate (2+) (A)  Negative Final    Nitrite, UA 03/23/2024 Negative  Negative Final    Urobilinogen, UA 03/23/2024 0.2 E.U./dL  0.2 - 1.0 E.U./dL Final    WBC 03/23/2024  10.75  3.40 - 10.80 10*3/mm3 Final    RBC 03/23/2024 4.23  3.77 - 5.28 10*6/mm3 Final    Hemoglobin 03/23/2024 14.7  12.0 - 15.9 g/dL Final    Hematocrit 03/23/2024 44.2  34.0 - 46.6 % Final    MCV 03/23/2024 104.5 (H)  79.0 - 97.0 fL Final    MCH 03/23/2024 34.8 (H)  26.6 - 33.0 pg Final    MCHC 03/23/2024 33.3  31.5 - 35.7 g/dL Final    RDW 03/23/2024 12.5  12.3 - 15.4 % Final    RDW-SD 03/23/2024 48.7  37.0 - 54.0 fl Final    MPV 03/23/2024 9.0  6.0 - 12.0 fL Final    Platelets 03/23/2024 360  140 - 450 10*3/mm3 Final    Neutrophil % 03/23/2024 51.1  42.7 - 76.0 % Final    Lymphocyte % 03/23/2024 41.7  19.6 - 45.3 % Final    Monocyte % 03/23/2024 5.3  5.0 - 12.0 % Final    Eosinophil % 03/23/2024 0.8  0.3 - 6.2 % Final    Basophil % 03/23/2024 0.8  0.0 - 1.5 % Final    Immature Grans % 03/23/2024 0.3  0.0 - 0.5 % Final    Neutrophils, Absolute 03/23/2024 5.49  1.70 - 7.00 10*3/mm3 Final    Lymphocytes, Absolute 03/23/2024 4.48 (H)  0.70 - 3.10 10*3/mm3 Final    Monocytes, Absolute 03/23/2024 0.57  0.10 - 0.90 10*3/mm3 Final    Eosinophils, Absolute 03/23/2024 0.09  0.00 - 0.40 10*3/mm3 Final    Basophils, Absolute 03/23/2024 0.09  0.00 - 0.20 10*3/mm3 Final    Immature Grans, Absolute 03/23/2024 0.03  0.00 - 0.05 10*3/mm3 Final    nRBC 03/23/2024 0.0  0.0 - 0.2 /100 WBC Final    Extra Tube 03/23/2024 Hold for add-ons.   Final    Auto resulted.    Extra Tube 03/23/2024 hold for add-on   Final    Auto resulted    Extra Tube 03/23/2024 Hold for add-ons.   Final    Auto resulted.    Extra Tube 03/23/2024 Hold for add-ons.   Final    Auto resulted    RBC, UA 03/23/2024 3-5 (A)  None Seen, 0-2 /HPF Final    WBC, UA 03/23/2024 21-50 (A)  None Seen, 0-2 /HPF Final    Bacteria, UA 03/23/2024 None Seen  None Seen /HPF Final    Squamous Epithelial Cells, UA 03/23/2024 0-2  None Seen, 0-2 /HPF Final    Hyaline Casts, UA 03/23/2024 None Seen  None Seen /LPF Final    Methodology 03/23/2024 Automated Microscopy   Final    Extra  Tube 03/23/2024 Hold for add-ons.   Final    Auto resulted.   Office Visit on 03/13/2024   Component Date Value Ref Range Status    Color 03/13/2024 Yellow  Yellow, Straw, Dark Yellow, Danni Final    Clarity, UA 03/13/2024 Clear  Clear Final    Specific Gravity  03/13/2024 1.010  1.005 - 1.030 Final    pH, Urine 03/13/2024 6.0  5.0 - 8.0 Final    Leukocytes 03/13/2024 500 Gita/ul (A)  Negative Final    Nitrite, UA 03/13/2024 Negative  Negative Final    Protein, POC 03/13/2024 Negative  Negative mg/dL Final    Glucose, UA 03/13/2024 Negative  Negative mg/dL Final    Ketones, UA 03/13/2024 Negative  Negative Final    Urobilinogen, UA 03/13/2024 Normal  Normal, 0.2 E.U./dL Final    Bilirubin 03/13/2024 Negative  Negative Final    Blood, UA 03/13/2024 2+ (A)  Negative Final    Lot Number 03/13/2024 [id number]   Final    Expiration Date 03/13/2024 102,024   Final    Urine Culture 03/13/2024 No growth   Final   Admission on 03/06/2024, Discharged on 03/06/2024   Component Date Value Ref Range Status    Glucose 03/06/2024 110 (H)  65 - 99 mg/dL Final    BUN 03/06/2024 6  6 - 20 mg/dL Final    Creatinine 03/06/2024 0.73  0.57 - 1.00 mg/dL Final    Sodium 03/06/2024 138  136 - 145 mmol/L Final    Potassium 03/06/2024 4.4  3.5 - 5.2 mmol/L Final    Slight hemolysis detected by analyzer. Result may be falsely elevated.    Chloride 03/06/2024 105  98 - 107 mmol/L Final    CO2 03/06/2024 21.4 (L)  22.0 - 29.0 mmol/L Final    Calcium 03/06/2024 9.3  8.6 - 10.5 mg/dL Final    Total Protein 03/06/2024 7.5  6.0 - 8.5 g/dL Final    Albumin 03/06/2024 4.3  3.5 - 5.2 g/dL Final    ALT (SGPT) 03/06/2024 11  1 - 33 U/L Final    AST (SGOT) 03/06/2024 14  1 - 32 U/L Final    Alkaline Phosphatase 03/06/2024 95  39 - 117 U/L Final    Total Bilirubin 03/06/2024 0.2  0.0 - 1.2 mg/dL Final    Globulin 03/06/2024 3.2  gm/dL Final    A/G Ratio 03/06/2024 1.3  g/dL Final    BUN/Creatinine Ratio 03/06/2024 8.2  7.0 - 25.0 Final    Anion Gap  03/06/2024 11.6  5.0 - 15.0 mmol/L Final    eGFR 03/06/2024 96.7  >60.0 mL/min/1.73 Final    Color, UA 03/06/2024 Yellow  Yellow, Straw Final    Appearance, UA 03/06/2024 Clear  Clear Final    pH, UA 03/06/2024 6.0  5.0 - 8.0 Final    Specific Gravity, UA 03/06/2024 1.009  1.005 - 1.030 Final    Glucose, UA 03/06/2024 Negative  Negative Final    Ketones, UA 03/06/2024 Negative  Negative Final    Bilirubin, UA 03/06/2024 Negative  Negative Final    Blood, UA 03/06/2024 Moderate (2+) (A)  Negative Final    Protein, UA 03/06/2024 Negative  Negative Final    Leuk Esterase, UA 03/06/2024 Trace (A)  Negative Final    Nitrite, UA 03/06/2024 Negative  Negative Final    Urobilinogen, UA 03/06/2024 0.2 E.U./dL  0.2 - 1.0 E.U./dL Final    WBC 03/06/2024 9.34  3.40 - 10.80 10*3/mm3 Final    RBC 03/06/2024 4.02  3.77 - 5.28 10*6/mm3 Final    Hemoglobin 03/06/2024 14.0  12.0 - 15.9 g/dL Final    Hematocrit 03/06/2024 42.5  34.0 - 46.6 % Final    MCV 03/06/2024 105.7 (H)  79.0 - 97.0 fL Final    MCH 03/06/2024 34.8 (H)  26.6 - 33.0 pg Final    MCHC 03/06/2024 32.9  31.5 - 35.7 g/dL Final    RDW 03/06/2024 12.7  12.3 - 15.4 % Final    RDW-SD 03/06/2024 49.8  37.0 - 54.0 fl Final    MPV 03/06/2024 9.2  6.0 - 12.0 fL Final    Platelets 03/06/2024 310  140 - 450 10*3/mm3 Final    Neutrophil % 03/06/2024 45.5  42.7 - 76.0 % Final    Lymphocyte % 03/06/2024 46.5 (H)  19.6 - 45.3 % Final    Monocyte % 03/06/2024 5.7  5.0 - 12.0 % Final    Eosinophil % 03/06/2024 1.0  0.3 - 6.2 % Final    Basophil % 03/06/2024 1.0  0.0 - 1.5 % Final    Immature Grans % 03/06/2024 0.3  0.0 - 0.5 % Final    Neutrophils, Absolute 03/06/2024 4.26  1.70 - 7.00 10*3/mm3 Final    Lymphocytes, Absolute 03/06/2024 4.34 (H)  0.70 - 3.10 10*3/mm3 Final    Monocytes, Absolute 03/06/2024 0.53  0.10 - 0.90 10*3/mm3 Final    Eosinophils, Absolute 03/06/2024 0.09  0.00 - 0.40 10*3/mm3 Final    Basophils, Absolute 03/06/2024 0.09  0.00 - 0.20 10*3/mm3 Final    Immature  Grans, Absolute 03/06/2024 0.03  0.00 - 0.05 10*3/mm3 Final    nRBC 03/06/2024 0.0  0.0 - 0.2 /100 WBC Final    Extra Tube 03/06/2024 Hold for add-ons.   Final    Auto resulted.    Extra Tube 03/06/2024 hold for add-on   Final    Auto resulted    Extra Tube 03/06/2024 Hold for add-ons.   Final    Auto resulted.    Extra Tube 03/06/2024 Hold for add-ons.   Final    Auto resulted    RBC, UA 03/06/2024 6-10 (A)  None Seen, 0-2 /HPF Final    WBC, UA 03/06/2024 6-10 (A)  None Seen, 0-2 /HPF Final    Bacteria, UA 03/06/2024 None Seen  None Seen /HPF Final    Squamous Epithelial Cells, UA 03/06/2024 0-2  None Seen, 0-2 /HPF Final    Hyaline Casts, UA 03/06/2024 None Seen  None Seen /LPF Final    Methodology 03/06/2024 Automated Microscopy   Final    Urine Culture 03/06/2024 No growth   Final   Admission on 02/01/2024, Discharged on 02/01/2024   Component Date Value Ref Range Status    Color, UA 02/01/2024 Yellow  Yellow, Straw Final    Appearance, UA 02/01/2024 Cloudy (A)  Clear Final    pH, UA 02/01/2024 6.0  5.0 - 8.0 Final    Specific Gravity, UA 02/01/2024 1.008  1.005 - 1.030 Final    Glucose, UA 02/01/2024 Negative  Negative Final    Ketones, UA 02/01/2024 Negative  Negative Final    Bilirubin, UA 02/01/2024 Negative  Negative Final    Blood, UA 02/01/2024 Moderate (2+) (A)  Negative Final    Protein, UA 02/01/2024 Trace (A)  Negative Final    Leuk Esterase, UA 02/01/2024 Large (3+) (A)  Negative Final    Nitrite, UA 02/01/2024 Negative  Negative Final    Urobilinogen, UA 02/01/2024 0.2 E.U./dL  0.2 - 1.0 E.U./dL Final    RBC, UA 02/01/2024 3-5 (A)  None Seen, 0-2 /HPF Final    WBC, UA 02/01/2024 Too Numerous to Count (A)  None Seen, 0-2 /HPF Final    Bacteria, UA 02/01/2024 Trace (A)  None Seen /HPF Final    Squamous Epithelial Cells, UA 02/01/2024 0-2  None Seen, 0-2 /HPF Final    Hyaline Casts, UA 02/01/2024 None Seen  None Seen /LPF Final    Methodology 02/01/2024 Manual Light Microscopy   Final    Glucose  02/01/2024 115 (H)  65 - 99 mg/dL Final    BUN 02/01/2024 5 (L)  6 - 20 mg/dL Final    Creatinine 02/01/2024 0.62  0.57 - 1.00 mg/dL Final    Sodium 02/01/2024 139  136 - 145 mmol/L Final    Potassium 02/01/2024 4.1  3.5 - 5.2 mmol/L Final    Chloride 02/01/2024 103  98 - 107 mmol/L Final    CO2 02/01/2024 24.0  22.0 - 29.0 mmol/L Final    Calcium 02/01/2024 9.3  8.6 - 10.5 mg/dL Final    Total Protein 02/01/2024 7.4  6.0 - 8.5 g/dL Final    Albumin 02/01/2024 4.4  3.5 - 5.2 g/dL Final    ALT (SGPT) 02/01/2024 23  1 - 33 U/L Final    AST (SGOT) 02/01/2024 19  1 - 32 U/L Final    Alkaline Phosphatase 02/01/2024 103  39 - 117 U/L Final    Total Bilirubin 02/01/2024 0.3  0.0 - 1.2 mg/dL Final    Globulin 02/01/2024 3.0  gm/dL Final    A/G Ratio 02/01/2024 1.5  g/dL Final    BUN/Creatinine Ratio 02/01/2024 8.1  7.0 - 25.0 Final    Anion Gap 02/01/2024 12.0  5.0 - 15.0 mmol/L Final    eGFR 02/01/2024 104.7  >60.0 mL/min/1.73 Final    C-Reactive Protein 02/01/2024 1.13 (H)  0.00 - 0.50 mg/dL Final    Procalcitonin 02/01/2024 0.05  0.00 - 0.25 ng/mL Final    Extra Tube 02/01/2024 Hold for add-ons.   Final    Auto resulted.    Extra Tube 02/01/2024 hold for add-on   Final    Auto resulted    Extra Tube 02/01/2024 Hold for add-ons.   Final    Auto resulted.    Extra Tube 02/01/2024 Hold for add-ons.   Final    Auto resulted    WBC 02/01/2024 11.48 (H)  3.40 - 10.80 10*3/mm3 Final    RBC 02/01/2024 4.17  3.77 - 5.28 10*6/mm3 Final    Hemoglobin 02/01/2024 14.5  12.0 - 15.9 g/dL Final    Hematocrit 02/01/2024 43.8  34.0 - 46.6 % Final    MCV 02/01/2024 105.0 (H)  79.0 - 97.0 fL Final    MCH 02/01/2024 34.8 (H)  26.6 - 33.0 pg Final    MCHC 02/01/2024 33.1  31.5 - 35.7 g/dL Final    RDW 02/01/2024 12.3  12.3 - 15.4 % Final    RDW-SD 02/01/2024 48.2  37.0 - 54.0 fl Final    MPV 02/01/2024 9.2  6.0 - 12.0 fL Final    Platelets 02/01/2024 275  140 - 450 10*3/mm3 Final    Neutrophil % 02/01/2024 71.1  42.7 - 76.0 % Final     Lymphocyte % 02/01/2024 20.4  19.6 - 45.3 % Final    Monocyte % 02/01/2024 7.1  5.0 - 12.0 % Final    Eosinophil % 02/01/2024 0.3  0.3 - 6.2 % Final    Basophil % 02/01/2024 0.7  0.0 - 1.5 % Final    Immature Grans % 02/01/2024 0.4  0.0 - 0.5 % Final    Neutrophils, Absolute 02/01/2024 8.16 (H)  1.70 - 7.00 10*3/mm3 Final    Lymphocytes, Absolute 02/01/2024 2.34  0.70 - 3.10 10*3/mm3 Final    Monocytes, Absolute 02/01/2024 0.82  0.10 - 0.90 10*3/mm3 Final    Eosinophils, Absolute 02/01/2024 0.03  0.00 - 0.40 10*3/mm3 Final    Basophils, Absolute 02/01/2024 0.08  0.00 - 0.20 10*3/mm3 Final    Immature Grans, Absolute 02/01/2024 0.05  0.00 - 0.05 10*3/mm3 Final    nRBC 02/01/2024 0.0  0.0 - 0.2 /100 WBC Final    Extra Tube 02/01/2024 Hold for add-ons.   Final    Auto resulted.    Blood Culture 02/01/2024 No growth at 5 days   Final    Blood Culture 02/01/2024 No growth at 5 days   Final    Lactate 02/01/2024 0.9  0.5 - 2.0 mmol/L Final    Urine Culture 02/01/2024 No growth   Final        Procedure: None  Procedures     I have reviewed and agree with the above PMH, PSH, FMH, social history, medications, allergies, and labs.     Assessment/Plan:   Problem List Items Addressed This Visit       Other microscopic hematuria - Primary     Other Visit Diagnoses       Pelvic pain        Relevant Orders    POC Urinalysis Dipstick, Automated (Completed)    Urine Culture - Urine, Urine, Clean Catch    Thrush        Relevant Medications    nystatin (MYCOSTATIN) 100,000 unit/mL suspension            Health Maintenance:   Health Maintenance Due   Topic Date Due    COLORECTAL CANCER SCREENING  Never done    Pneumococcal Vaccine 0-64 (1 of 2 - PCV) Never done    TDAP/TD VACCINES (1 - Tdap) Never done    ZOSTER VACCINE (1 of 2) Never done    MAMMOGRAM  06/06/2019    HEPATITIS C SCREENING  Never done    ANNUAL PHYSICAL  Never done    PAP SMEAR  Never done    COVID-19 Vaccine (2 - 2023-24 season) 09/01/2023    LIPID PANEL  09/29/2023         Smoking Counseling: Everyday smoker.  Never used smokeless tobacco.  Counseling given however not ready to quit at this time.    Urine Incontinence: Patient reports that she is not currently experiencing any symptoms of urinary incontinence.    Patient was given instructions and counseling regarding her condition or for health maintenance advice. Please see specific information pulled into the AVS if appropriate.    Patient Education:   Microscopic hematuria -patient had a CT scan abdomen pelvis that showed no significant acute causes of microscopic hematuria.  She continues to have microscopic blood on UA.  She denies any gross hematuria.  I will resend the patient's urine off for culture and call with results once available.  Given patient's current history of thrush and yeast infections she wishes to hold off on antibiotics unless culture results are positive.  Advised her if she has any worsening urinary symptoms or begins to experience fever or chills go to the nearest ER for evaluation.  I did advise patient if hematuria continues we can proceed forward with a cystoscopy for evaluation of the lower urinary tract system.  Patient verbalized understanding.  Thrush -patient continues to have significant thrush and I will send in nystatin suspension to use up to 4 times a day as needed.  Patient verbalized understanding.  Pelvic pain/right hip pain -patient continues to have significant pelvic pain and right hip pain which I believe is most likely secondary from muscle strain/pull.  Patient did have a notable pop previous to symptoms and has since had worsening ambulatory problems.  She is scheduled to follow-up with an orthopedic at the end of this month and recommend to keep follow-up appointment.  Advised patient she would benefit best from having a MRI of the hip/pelvis completed for further evaluation.  Patient verbalized understanding and we will see her back pending urine culture    Visit Diagnoses:     ICD-10-CM ICD-9-CM   1. Other microscopic hematuria  R31.29 599.72   2. Pelvic pain  R10.2 [id number]   3. Thrush  B37.0 112.0       Meds Ordered During Visit:  New Medications Ordered This Visit   Medications    nystatin (MYCOSTATIN) 100,000 unit/mL suspension     Sig: Swish and swallow 5 mL 4 (Four) Times a Day.     Dispense:  60 mL     Refill:  1       Follow Up Appointment: Pending urine culture No follow-ups on file.      This document has been electronically signed by Sanchez Larry PA-C   April 18, 2024 16:27 EDT    Part of this note may be an electronic transcription/translation of spoken language to printed text using the Dragon Dictation System.

## 2024-04-19 ENCOUNTER — TELEPHONE (OUTPATIENT)
Dept: GASTROENTEROLOGY | Facility: CLINIC | Age: 59
End: 2024-04-19
Payer: COMMERCIAL

## 2024-04-19 NOTE — TELEPHONE ENCOUNTER
Pharmacy: [facility]    Patient wanted to know if amoxicillin in the capsule could be called in to the above pharmacy.

## 2024-04-20 LAB — BACTERIA SPEC AEROBE CULT: NO GROWTH

## 2024-04-22 NOTE — TELEPHONE ENCOUNTER
Called patient about phone call.  Advised her urine culture showed no growth so she does not need an antibiotic.  She verbalized understanding.  Did discuss the use of a cystoscopy for workup of her microscopic hematuria however she wishes to follow-up with orthopedic first.  Advised her to call to reschedule cystoscopy in office and she verbalized understanding.

## 2024-04-23 DIAGNOSIS — B37.0 THRUSH: ICD-10-CM

## 2024-05-16 ENCOUNTER — TELEPHONE (OUTPATIENT)
Dept: CARDIOLOGY | Facility: CLINIC | Age: 59
End: 2024-05-16
Payer: COMMERCIAL

## 2024-05-16 NOTE — TELEPHONE ENCOUNTER
REQUEST FOR CARDIAC CLEARANCE    Caller name:[facility]    Phone Number: [phone] EXT [phone] [address]    Surgeon's name: DR. SALMA PATINO    Type of planned surgery: RIGHT HIP IN-SITU     Date of planned surgery: 5.23.2024    Type of anesthesia: GENERAL    Have you been experiencing chest pain or shortness of breath? NO    Is your doctor requesting for you to stop any of your medications prior to your surgery? ANY BLOOD THINNER    Where should we fax the clearance to? [phone]   ALSO PER THE HOSPITAL AN MD HAS TO SIGN OFF ON THE CARDIAC CLEARANCE.

## 2024-05-16 NOTE — TELEPHONE ENCOUNTER
Called patient to advise her she will need an appt for a surgical Clearance as she has not been seen since last year.  Also advised her she may need to have further testing performed prior to clearance for surgery. She voiced understanding.   She stated her hip surgery at [facility] is scheduled for next Thursday.  Made her an appt for this tues May 21 st for clearance.        REQUEST FOR CARDIAC CLEARANCE     Caller name:[facility]     Phone Number: [phone] EXT [phone] [address]     Surgeon's name: DR. SALMA PATINO     Type of planned surgery: RIGHT HIP IN-SITU      Date of planned surgery: 5.23.2024     Type of anesthesia: GENERAL     Have you been experiencing chest pain or shortness of breath? NO     Is your doctor requesting for you to stop any of your medications prior to your surgery? ANY BLOOD THINNER     Where should we fax the clearance to? [phone]   ALSO PER THE HOSPITAL AN MD HAS TO SIGN OFF ON THE CARDIAC CLEARANCE.

## 2024-05-21 ENCOUNTER — TRANSCRIBE ORDERS (OUTPATIENT)
Dept: ADMINISTRATIVE | Facility: HOSPITAL | Age: 59
End: 2024-05-21
Payer: COMMERCIAL

## 2024-05-21 ENCOUNTER — OFFICE VISIT (OUTPATIENT)
Dept: CARDIOLOGY | Facility: CLINIC | Age: 59
End: 2024-05-21
Payer: COMMERCIAL

## 2024-05-21 ENCOUNTER — TELEPHONE (OUTPATIENT)
Dept: CARDIOLOGY | Facility: CLINIC | Age: 59
End: 2024-05-21

## 2024-05-21 ENCOUNTER — HOSPITAL ENCOUNTER (OUTPATIENT)
Dept: GENERAL RADIOLOGY | Facility: HOSPITAL | Age: 59
Discharge: HOME | End: 2024-05-21
Admitting: INTERNAL MEDICINE
Payer: COMMERCIAL

## 2024-05-21 VITALS
OXYGEN SATURATION: 97 % | WEIGHT: 138 LBS | DIASTOLIC BLOOD PRESSURE: 76 MMHG | BODY MASS INDEX: 22.99 KG/M2 | SYSTOLIC BLOOD PRESSURE: 130 MMHG | HEIGHT: 65 IN | HEART RATE: 102 BPM

## 2024-05-21 DIAGNOSIS — R00.0 SINUS TACHYCARDIA: Primary | ICD-10-CM

## 2024-05-21 DIAGNOSIS — R06.2 WHEEZING: ICD-10-CM

## 2024-05-21 DIAGNOSIS — Z72.0 TOBACCO USE: ICD-10-CM

## 2024-05-21 DIAGNOSIS — Z01.810 PREOPERATIVE CARDIOVASCULAR EXAMINATION: ICD-10-CM

## 2024-05-21 DIAGNOSIS — R06.2 WHEEZING: Primary | ICD-10-CM

## 2024-05-21 DIAGNOSIS — Z91.89 MULTIPLE RISK FACTORS FOR CORONARY ARTERY DISEASE: ICD-10-CM

## 2024-05-21 DIAGNOSIS — R94.31 ABNORMAL EKG: ICD-10-CM

## 2024-05-21 DIAGNOSIS — E78.5 HYPERLIPIDEMIA, UNSPECIFIED HYPERLIPIDEMIA TYPE: ICD-10-CM

## 2024-05-21 PROCEDURE — 71046 X-RAY EXAM CHEST 2 VIEWS: CPT

## 2024-05-21 NOTE — PROGRESS NOTES
"Subjective     Basilia Coco Chamberlain is a 58 y.o. female.   Chief Complaint   Patient presents with    Surgical Clearance     Right Hip  Dr Arash Elizalde     History of Present Illness   Basilia \"Coco\" Bulmaro is a 58-year-old female who presents to clinic today for delayed cardiology follow-up.  She is accompanied by her .    Hyperlipidemia currently on Lipitor 20 mg daily.  She reports compliance with medicine and denies medicine side effects. No labs available for review.     Paroxysmal SVT was previously on Toprol XL 12.5 mg daily.  She reports her BP was soft therefore she self discontinued her Toprol.  She has been off medication for some time and denies worsening palpitations, tachycardia, bradycardia, dizziness or syncope.      She was previously on HCTZ for lower extremity swelling but reports due to hypotension she self discontinued.  She denies any complaint of lower extremity swelling.       She continues to experience intermittent chest discomfort but denies current complaint of chest pain. Stress test has been recommended and arranged however patient has been unable to keep her appointment.  Coronary CT was arranged at last visit but patient was unable to keep her appointment.  Her EKG continues to show nonspecific changes.  She denies current symptoms.  She reports discontinuing her aspirin for anticipation of surgery. She denies underlying history of DM.  She has planned surgery in 2 days of her hip with orthopedics and needing cardiac clearance.     She is a smoker with underlying COPD and reports a congested cough for the last several days.  She denies fever cough is at times productive.  She has been using her routine maintenance inhalers and nebulizer.    Patient Active Problem List   Diagnosis    Abnormal mammogram    Breast mass    Breast cyst    Centrilobular emphysema    Encounter for screening for malignant neoplasm of colon    Hyperlipidemia    Paroxysmal SVT (supraventricular " tachycardia)    Pericardial effusion    Pedal edema    Other microscopic hematuria     Past Medical History:   Diagnosis Date    Anxiety 06/25/2018    Arthritis     COPD (chronic obstructive pulmonary disease)     Elevated cholesterol     Fibromyalgia     Migraines     RSD (reflex sympathetic dystrophy)      History reviewed. No pertinent surgical history.    Family History   Problem Relation Age of Onset    Hypertension Mother     Hypertension Father     Heart disease Sister     Diabetes Sister     Breast cancer Neg Hx      Social History     Tobacco Use    Smoking status: Every Day     Types: Cigarettes    Smokeless tobacco: Never    Tobacco comments:     smoked about 2 ppd for about 15 years.     Vaping Use    Vaping status: Never Used   Substance Use Topics    Alcohol use: No    Drug use: No     The following portions of the patient's history were reviewed and updated as appropriate: allergies, current medications, past family history, past medical history, past social history, past surgical history and problem list.    Allergies   Allergen Reactions    Floxin [Ofloxacin]          Current Outpatient Medications:     acetaminophen (TYLENOL) 500 MG tablet, Take 1 tablet by mouth Every 6 (Six) Hours As Needed for Mild Pain., Disp: , Rfl:     albuterol sulfate  (90 Base) MCG/ACT inhaler, Inhale 2 puffs Every 4 (Four) Hours As Needed for Wheezing., Disp: 18 g, Rfl: 5    Allergy Relief 10 MG tablet, , Disp: , Rfl:     ALPRAZolam (XANAX) 2 MG tablet, Take 1 tablet by mouth., Disp: , Rfl:     amitriptyline (ELAVIL) 25 MG tablet, , Disp: , Rfl:     aspirin 81 MG EC tablet, Take 1 tablet by mouth Daily., Disp: 90 tablet, Rfl: 1    atorvastatin (LIPITOR) 20 MG tablet, Take 1 tablet by mouth Daily., Disp: 90 tablet, Rfl: 1    diclofenac (VOLTAREN) 75 MG EC tablet, Take 1 tablet by mouth 2 (Two) Times a Day As Needed (pain)., Disp: 12 tablet, Rfl: 0    fluticasone (FLOVENT HFA) 220 MCG/ACT inhaler, Inhale 2 puffs 2  (Two) Times a Day., Disp: 12 g, Rfl: 5    ipratropium-albuterol (DUO-NEB) 0.5-2.5 mg/3 ml nebulizer, Take 3 mL by nebulization 4 (Four) Times a Day As Needed for Wheezing or Shortness of Air., Disp: 120 mL, Rfl: 5    methocarbamol (ROBAXIN) 750 MG tablet, Take 1 tablet by mouth 4 (Four) Times a Day As Needed for Muscle Spasms., Disp: , Rfl:     O2 (OXYGEN), Inhale 2 L/min Every Night., Disp: , Rfl:     Stiolto Respimat 2.5-2.5 MCG/ACT aerosol solution inhaler, Inhale 2 puffs Daily., Disp: 4 g, Rfl: 5    vitamin D (ERGOCALCIFEROL) 1.25 MG (38644 UT) capsule capsule, Take 1 capsule by mouth 1 (One) Time Per Week., Disp: , Rfl:     cefdinir (OMNICEF) 300 MG capsule, Take 1 capsule by mouth 2 (Two) Times a Day. (Patient not taking: Reported on 5/21/2024), Disp: 14 capsule, Rfl: 0    escitalopram (LEXAPRO) 5 MG tablet, Take 1 tablet by mouth Daily. (Patient not taking: Reported on 5/21/2024), Disp: , Rfl:     hydroCHLOROthiazide (HYDRODIURIL) 25 MG tablet, Take 0.5 tablets by mouth Daily. (Patient not taking: Reported on 5/21/2024), Disp: 45 tablet, Rfl: 1    metoprolol succinate XL (TOPROL-XL) 25 MG 24 hr tablet, Take 0.5 tablets by mouth 2 (Two) Times a Day. (Patient not taking: Reported on 5/21/2024), Disp: 90 tablet, Rfl: 1    nystatin (MYCOSTATIN) 100,000 unit/mL suspension, SWISH AND SWALLOW 5 ML BY MOUTH FOUR TIMES DAILY (Patient not taking: Reported on 5/21/2024), Disp: 60 mL, Rfl: 1    ofloxacin (FLOXIN) 0.3 % otic solution, Administer 5 drops to the right ear Daily. (Patient not taking: Reported on 5/21/2024), Disp: 5 mL, Rfl: 0    predniSONE (DELTASONE) 20 MG tablet, Take 1 tablet by mouth Daily. (Patient not taking: Reported on 5/21/2024), Disp: , Rfl:     Review of Systems   Constitutional:  Negative for activity change, appetite change, chills, diaphoresis, fatigue and fever.   HENT:  Negative for congestion, drooling, ear discharge, ear pain, mouth sores, nosebleeds, postnasal drip, rhinorrhea, sinus  "pressure, sneezing and sore throat.    Eyes:  Negative for pain, discharge and visual disturbance.   Respiratory:  Positive for cough. Negative for chest tightness, shortness of breath and wheezing.    Cardiovascular:  Positive for chest pain. Negative for palpitations and leg swelling.   Gastrointestinal:  Negative for abdominal pain, constipation, diarrhea, nausea and vomiting.   Endocrine: Negative for cold intolerance, heat intolerance, polydipsia, polyphagia and polyuria.   Musculoskeletal:  Negative for arthralgias, myalgias and neck pain.   Skin:  Negative for rash and wound.   Neurological:  Negative for dizziness, syncope, speech difficulty, weakness, light-headedness and headaches.   Hematological:  Negative for adenopathy. Does not bruise/bleed easily.   Psychiatric/Behavioral:  Negative for confusion, dysphoric mood and sleep disturbance. The patient is not nervous/anxious.    All other systems reviewed and are negative.    /76 (BP Location: Left arm, Patient Position: Sitting, Cuff Size: Adult)   Pulse 102   Ht 165.1 cm (65\")   Wt 62.6 kg (138 lb)   LMP  (LMP Unknown)   SpO2 97%   BMI 22.96 kg/m²     Objective   Allergies   Allergen Reactions    Floxin [Ofloxacin]        Physical Exam  Vitals reviewed.   Constitutional:       Appearance: Normal appearance. She is well-developed.      Comments: In wheelchair today    HENT:      Head: Normocephalic.      Right Ear: Tympanic membrane normal.      Left Ear: Tympanic membrane normal.      Nose: Nose normal.   Eyes:      Conjunctiva/sclera: Conjunctivae normal.      Pupils: Pupils are equal, round, and reactive to light.   Neck:      Thyroid: No thyromegaly.      Vascular: No carotid bruit or JVD.   Cardiovascular:      Rate and Rhythm: Regular rhythm. Tachycardia present.   Pulmonary:      Effort: Pulmonary effort is normal.      Breath sounds: Normal breath sounds.   Abdominal:      General: Bowel sounds are normal.      Palpations: Abdomen is " soft. There is no hepatomegaly, splenomegaly or mass.      Tenderness: There is no abdominal tenderness.   Musculoskeletal:         General: Normal range of motion.      Cervical back: Neck supple.      Right lower leg: No edema.      Left lower leg: No edema.   Skin:     General: Skin is warm and dry.   Neurological:      Mental Status: She is alert and oriented to person, place, and time.   Psychiatric:         Attention and Perception: Attention normal.         Mood and Affect: Mood normal.         Speech: Speech normal.         Behavior: Behavior normal. Behavior is cooperative.         Cognition and Memory: Cognition normal.           ECG 12 Lead    Date/Time: 5/21/2024 1:51 PM  Performed by: Isabelle Bunn APRN    Authorized by: Isabelle Bunn APRN  Comparison: compared with previous ECG   Similar to previous ECG  Rhythm: sinus tachycardia  Rate: tachycardic  BPM: 102  Conduction: 1st degree AV block and non-specific intraventricular conduction delay  Other findings: non-specific ST-T wave changes and T wave abnormality    Clinical impression: myocardial ischemia  Clinical impression comment: consider lateral ischemia  Comments: QT/QTc - 313/372          LABS  WBC   Date Value Ref Range Status   03/23/2024 10.75 3.40 - 10.80 10*3/mm3 Final     RBC   Date Value Ref Range Status   03/23/2024 4.23 3.77 - 5.28 10*6/mm3 Final     Hemoglobin   Date Value Ref Range Status   03/23/2024 14.7 12.0 - 15.9 g/dL Final     Hematocrit   Date Value Ref Range Status   03/23/2024 44.2 34.0 - 46.6 % Final     MCV   Date Value Ref Range Status   03/23/2024 104.5 (H) 79.0 - 97.0 fL Final     MCH   Date Value Ref Range Status   03/23/2024 34.8 (H) 26.6 - 33.0 pg Final     MCHC   Date Value Ref Range Status   03/23/2024 33.3 31.5 - 35.7 g/dL Final     RDW   Date Value Ref Range Status   03/23/2024 12.5 12.3 - 15.4 % Final     RDW-SD   Date Value Ref Range Status   03/23/2024 48.7 37.0 - 54.0 fl Final     MPV   Date Value Ref  Range Status   03/23/2024 9.0 6.0 - 12.0 fL Final     Platelets   Date Value Ref Range Status   03/23/2024 360 140 - 450 10*3/mm3 Final     Neutrophil %   Date Value Ref Range Status   03/23/2024 51.1 42.7 - 76.0 % Final     Lymphocyte %   Date Value Ref Range Status   03/23/2024 41.7 19.6 - 45.3 % Final     Monocyte %   Date Value Ref Range Status   03/23/2024 5.3 5.0 - 12.0 % Final     Eosinophil %   Date Value Ref Range Status   03/23/2024 0.8 0.3 - 6.2 % Final     Basophil %   Date Value Ref Range Status   03/23/2024 0.8 0.0 - 1.5 % Final     Immature Grans %   Date Value Ref Range Status   03/23/2024 0.3 0.0 - 0.5 % Final     Neutrophils, Absolute   Date Value Ref Range Status   03/23/2024 5.49 1.70 - 7.00 10*3/mm3 Final     Lymphocytes, Absolute   Date Value Ref Range Status   03/23/2024 4.48 (H) 0.70 - 3.10 10*3/mm3 Final     Monocytes, Absolute   Date Value Ref Range Status   03/23/2024 0.57 0.10 - 0.90 10*3/mm3 Final     Eosinophils, Absolute   Date Value Ref Range Status   03/23/2024 0.09 0.00 - 0.40 10*3/mm3 Final     Basophils, Absolute   Date Value Ref Range Status   03/23/2024 0.09 0.00 - 0.20 10*3/mm3 Final     Immature Grans, Absolute   Date Value Ref Range Status   03/23/2024 0.03 0.00 - 0.05 10*3/mm3 Final     nRBC   Date Value Ref Range Status   03/23/2024 0.0 0.0 - 0.2 /100 WBC Final       Total Cholesterol   Date Value Ref Range Status   09/29/2022 178 0 - 200 mg/dL Final     Triglycerides   Date Value Ref Range Status   09/29/2022 69 0 - 150 mg/dL Final     HDL Cholesterol   Date Value Ref Range Status   09/29/2022 81 (H) 40 - 60 mg/dL Final     LDL Cholesterol    Date Value Ref Range Status   09/29/2022 84 0 - 100 mg/dL Final     IMAGING   XR Chest 2 View    Result Date: 4/18/2024    Probably atelectasis or scarring of left lower lung.   This report was finalized on 4/18/2024 9:52 AM by Dr. Adam Hall MD.      XR Femur 2 View Right    Result Date: 3/23/2024   1.  No right femur fracture.     To TALK to On Call [phone]  This report was finalized on 3/23/2024 3:05 PM by Gio Rodriguez MD.      XR Hip With or Without Pelvis 2 - 3 View Right    Result Date: 3/23/2024   1. No fracture or malalignment.    To TALK to On Call [phone]  This report was finalized on 3/23/2024 3:05 PM by Gio Rodriguez MD.      US Venous Doppler Lower Extremity Right (duplex)    Result Date: 3/23/2024   1. Negative for deep venous thrombosis in the right lower extremity.     To TALK to On Call [phone]  This report was finalized on 3/23/2024 2:58 PM by Gio Rodriguez MD.      US Pelvis Complete    Result Date: 3/20/2024    Bilateral ovaries not visualized.   This report was finalized on 3/20/2024 7:30 AM by Dr. Adam Hall MD.      CT Abdomen Pelvis With Contrast    Result Date: 3/6/2024    No acute findings in the abdomen or pelvis.   This report was finalized on 3/6/2024 2:40 PM by Dr. Adam Hall MD.        Echocardiogram   Interpretation Summary    This is a limited study for follow-up of pericardial effusion.  There is no Doppler available  Normal left ventricular cavity size with normal wall motion.  Left ventricular ejection fraction appears to be 61 - 65%. Left ventricular systolic function is normal.  Trace anterior echo-free space is noted which may represent trivial pericardial effusion adjacent to the right ventricle. it is significantly improved.           Assessment & Plan   Diagnoses and all orders for this visit:    1. Sinus tachycardia (Primary)  -     ECG 12 Lead  EKG reviewed and discussed, no acute change  Patient is tachycardic, recommend resuming low-dose metoprolol while monitoring BP and heart rate  Recommend avoidance of caffeine/stimulant    2. Hyperlipidemia, unspecified hyperlipidemia type  Continue on statin, no recent labs available for review, LDL goal .  Heart healthy diet, management through PCP    3. Abnormal EKG  EKG with nonspecific  changes and intermittent symptoms, ischemic evaluation has been ordered in the past and continues to be recommended. Will reschedule.     4. Preoperative cardiovascular examination  Due to abnormal EKG and intermittent complaints, ischemic evaluation is recommended.  Patient would be very high risk due to nonspecific EKG changes, intermittent complaints without formal ischemic evaluation.  If patient elects to proceed we will be happy to arrange  Recommend optimum heart rate control prior to surgery    5. Tobacco use  Recommend avoidance of tobacco products    She mentions a persistent productive cough ongoing for several days, recommend follow-up with urgent care or primary.    Review of medical record    Lifestyle modifications including heart healthy diet, regular exercise, maintenance of desirable body weight and avoidance of tobacco products.    Follow-up in 3 months, sooner if needed.

## 2024-05-21 NOTE — LETTER
"May 21, 2024     Yvonne Marr MD  [address] [place] [address]    Patient: Basilia Chamberlain   YOB: 1965   Date of Visit: 5/21/2024       Dear Yvonne Marr MD    Basilia Chamberlain was in my office today. Below is a copy of my note.    If you have questions, please do not hesitate to call me. I look forward to following Basilia along with you.         Sincerely,        ALANA Christiansen        CC: Arash Elizalde, DO    Subjective    Basilia Chamberlain is a 58 y.o. female.   Chief Complaint   Patient presents with   • Surgical Clearance     Right Hip  Dr Arash Elizalde     History of Present Illness   Basilia \"Coco\" Bulmaro is a 58-year-old female who presents to clinic today for delayed cardiology follow-up.  She is accompanied by her .    Hyperlipidemia currently on Lipitor 20 mg daily.  She reports compliance with medicine and denies medicine side effects. No labs available for review.     Paroxysmal SVT was previously on Toprol XL 12.5 mg daily.  She reports her BP was soft therefore she self discontinued her Toprol.  She has been off medication for some time and denies worsening palpitations, tachycardia, bradycardia, dizziness or syncope.      She was previously on HCTZ for lower extremity swelling but reports due to hypotension she self discontinued.  She denies any complaint of lower extremity swelling.       She continues to experience intermittent chest discomfort but denies current complaint of chest pain. Stress test has been recommended and arranged however patient has been unable to keep her appointment.  Coronary CT was arranged at last visit but patient was unable to keep her appointment.  Her EKG continues to show nonspecific changes.  She denies current symptoms.  She reports discontinuing her aspirin for anticipation of surgery. She denies underlying history of DM.  She has planned surgery in 2 days of her hip with orthopedics and needing " cardiac clearance.     She is a smoker with underlying COPD and reports a congested cough for the last several days.  She denies fever cough is at times productive.  She has been using her routine maintenance inhalers and nebulizer.    Patient Active Problem List   Diagnosis   • Abnormal mammogram   • Breast mass   • Breast cyst   • Centrilobular emphysema   • Encounter for screening for malignant neoplasm of colon   • Hyperlipidemia   • Paroxysmal SVT (supraventricular tachycardia)   • Pericardial effusion   • Pedal edema   • Other microscopic hematuria     Past Medical History:   Diagnosis Date   • Anxiety 06/25/2018   • Arthritis    • COPD (chronic obstructive pulmonary disease)    • Elevated cholesterol    • Fibromyalgia    • Migraines    • RSD (reflex sympathetic dystrophy)      History reviewed. No pertinent surgical history.    Family History   Problem Relation Age of Onset   • Hypertension Mother    • Hypertension Father    • Heart disease Sister    • Diabetes Sister    • Breast cancer Neg Hx      Social History     Tobacco Use   • Smoking status: Every Day     Types: Cigarettes   • Smokeless tobacco: Never   • Tobacco comments:     smoked about 2 ppd for about 15 years.     Vaping Use   • Vaping status: Never Used   Substance Use Topics   • Alcohol use: No   • Drug use: No     The following portions of the patient's history were reviewed and updated as appropriate: allergies, current medications, past family history, past medical history, past social history, past surgical history and problem list.    Allergies   Allergen Reactions   • Floxin [Ofloxacin]          Current Outpatient Medications:   •  acetaminophen (TYLENOL) 500 MG tablet, Take 1 tablet by mouth Every 6 (Six) Hours As Needed for Mild Pain., Disp: , Rfl:   •  albuterol sulfate  (90 Base) MCG/ACT inhaler, Inhale 2 puffs Every 4 (Four) Hours As Needed for Wheezing., Disp: 18 g, Rfl: 5  •  Allergy Relief 10 MG tablet, , Disp: , Rfl:   •   ALPRAZolam (XANAX) 2 MG tablet, Take 1 tablet by mouth., Disp: , Rfl:   •  amitriptyline (ELAVIL) 25 MG tablet, , Disp: , Rfl:   •  aspirin 81 MG EC tablet, Take 1 tablet by mouth Daily., Disp: 90 tablet, Rfl: 1  •  atorvastatin (LIPITOR) 20 MG tablet, Take 1 tablet by mouth Daily., Disp: 90 tablet, Rfl: 1  •  diclofenac (VOLTAREN) 75 MG EC tablet, Take 1 tablet by mouth 2 (Two) Times a Day As Needed (pain)., Disp: 12 tablet, Rfl: 0  •  fluticasone (FLOVENT HFA) 220 MCG/ACT inhaler, Inhale 2 puffs 2 (Two) Times a Day., Disp: 12 g, Rfl: 5  •  ipratropium-albuterol (DUO-NEB) 0.5-2.5 mg/3 ml nebulizer, Take 3 mL by nebulization 4 (Four) Times a Day As Needed for Wheezing or Shortness of Air., Disp: 120 mL, Rfl: 5  •  methocarbamol (ROBAXIN) 750 MG tablet, Take 1 tablet by mouth 4 (Four) Times a Day As Needed for Muscle Spasms., Disp: , Rfl:   •  O2 (OXYGEN), Inhale 2 L/min Every Night., Disp: , Rfl:   •  Stiolto Respimat 2.5-2.5 MCG/ACT aerosol solution inhaler, Inhale 2 puffs Daily., Disp: 4 g, Rfl: 5  •  vitamin D (ERGOCALCIFEROL) 1.25 MG (71137 UT) capsule capsule, Take 1 capsule by mouth 1 (One) Time Per Week., Disp: , Rfl:   •  cefdinir (OMNICEF) 300 MG capsule, Take 1 capsule by mouth 2 (Two) Times a Day. (Patient not taking: Reported on 5/21/2024), Disp: 14 capsule, Rfl: 0  •  escitalopram (LEXAPRO) 5 MG tablet, Take 1 tablet by mouth Daily. (Patient not taking: Reported on 5/21/2024), Disp: , Rfl:   •  hydroCHLOROthiazide (HYDRODIURIL) 25 MG tablet, Take 0.5 tablets by mouth Daily. (Patient not taking: Reported on 5/21/2024), Disp: 45 tablet, Rfl: 1  •  metoprolol succinate XL (TOPROL-XL) 25 MG 24 hr tablet, Take 0.5 tablets by mouth 2 (Two) Times a Day. (Patient not taking: Reported on 5/21/2024), Disp: 90 tablet, Rfl: 1  •  nystatin (MYCOSTATIN) 100,000 unit/mL suspension, SWISH AND SWALLOW 5 ML BY MOUTH FOUR TIMES DAILY (Patient not taking: Reported on 5/21/2024), Disp: 60 mL, Rfl: 1  •  ofloxacin (FLOXIN)  "0.3 % otic solution, Administer 5 drops to the right ear Daily. (Patient not taking: Reported on 5/21/2024), Disp: 5 mL, Rfl: 0  •  predniSONE (DELTASONE) 20 MG tablet, Take 1 tablet by mouth Daily. (Patient not taking: Reported on 5/21/2024), Disp: , Rfl:     Review of Systems   Constitutional:  Negative for activity change, appetite change, chills, diaphoresis, fatigue and fever.   HENT:  Negative for congestion, drooling, ear discharge, ear pain, mouth sores, nosebleeds, postnasal drip, rhinorrhea, sinus pressure, sneezing and sore throat.    Eyes:  Negative for pain, discharge and visual disturbance.   Respiratory:  Positive for cough. Negative for chest tightness, shortness of breath and wheezing.    Cardiovascular:  Positive for chest pain. Negative for palpitations and leg swelling.   Gastrointestinal:  Negative for abdominal pain, constipation, diarrhea, nausea and vomiting.   Endocrine: Negative for cold intolerance, heat intolerance, polydipsia, polyphagia and polyuria.   Musculoskeletal:  Negative for arthralgias, myalgias and neck pain.   Skin:  Negative for rash and wound.   Neurological:  Negative for dizziness, syncope, speech difficulty, weakness, light-headedness and headaches.   Hematological:  Negative for adenopathy. Does not bruise/bleed easily.   Psychiatric/Behavioral:  Negative for confusion, dysphoric mood and sleep disturbance. The patient is not nervous/anxious.    All other systems reviewed and are negative.    /76 (BP Location: Left arm, Patient Position: Sitting, Cuff Size: Adult)   Pulse 102   Ht 165.1 cm (65\")   Wt 62.6 kg (138 lb)   LMP  (LMP Unknown)   SpO2 97%   BMI 22.96 kg/m²     Objective  Allergies   Allergen Reactions   • Floxin [Ofloxacin]        Physical Exam  Vitals reviewed.   Constitutional:       Appearance: Normal appearance. She is well-developed.      Comments: In wheelchair today    HENT:      Head: Normocephalic.      Right Ear: Tympanic membrane normal. "      Left Ear: Tympanic membrane normal.      Nose: Nose normal.   Eyes:      Conjunctiva/sclera: Conjunctivae normal.      Pupils: Pupils are equal, round, and reactive to light.   Neck:      Thyroid: No thyromegaly.      Vascular: No carotid bruit or JVD.   Cardiovascular:      Rate and Rhythm: Regular rhythm. Tachycardia present.   Pulmonary:      Effort: Pulmonary effort is normal.      Breath sounds: Normal breath sounds.   Abdominal:      General: Bowel sounds are normal.      Palpations: Abdomen is soft. There is no hepatomegaly, splenomegaly or mass.      Tenderness: There is no abdominal tenderness.   Musculoskeletal:         General: Normal range of motion.      Cervical back: Neck supple.      Right lower leg: No edema.      Left lower leg: No edema.   Skin:     General: Skin is warm and dry.   Neurological:      Mental Status: She is alert and oriented to person, place, and time.   Psychiatric:         Attention and Perception: Attention normal.         Mood and Affect: Mood normal.         Speech: Speech normal.         Behavior: Behavior normal. Behavior is cooperative.         Cognition and Memory: Cognition normal.           ECG 12 Lead    Date/Time: 5/21/2024 1:51 PM  Performed by: Isabelle Bunn APRN    Authorized by: Isabelle Bunn APRN  Comparison: compared with previous ECG   Similar to previous ECG  Rhythm: sinus tachycardia  Rate: tachycardic  BPM: 102  Conduction: 1st degree AV block and non-specific intraventricular conduction delay  Other findings: non-specific ST-T wave changes and T wave abnormality    Clinical impression: myocardial ischemia  Clinical impression comment: consider lateral ischemia  Comments: QT/QTc - 313/372          LABS  WBC   Date Value Ref Range Status   03/23/2024 10.75 3.40 - 10.80 10*3/mm3 Final     RBC   Date Value Ref Range Status   03/23/2024 4.23 3.77 - 5.28 10*6/mm3 Final     Hemoglobin   Date Value Ref Range Status   03/23/2024 14.7 12.0 - 15.9 g/dL  Final     Hematocrit   Date Value Ref Range Status   03/23/2024 44.2 34.0 - 46.6 % Final     MCV   Date Value Ref Range Status   03/23/2024 104.5 (H) 79.0 - 97.0 fL Final     MCH   Date Value Ref Range Status   03/23/2024 34.8 (H) 26.6 - 33.0 pg Final     MCHC   Date Value Ref Range Status   03/23/2024 33.3 31.5 - 35.7 g/dL Final     RDW   Date Value Ref Range Status   03/23/2024 12.5 12.3 - 15.4 % Final     RDW-SD   Date Value Ref Range Status   03/23/2024 48.7 37.0 - 54.0 fl Final     MPV   Date Value Ref Range Status   03/23/2024 9.0 6.0 - 12.0 fL Final     Platelets   Date Value Ref Range Status   03/23/2024 360 140 - 450 10*3/mm3 Final     Neutrophil %   Date Value Ref Range Status   03/23/2024 51.1 42.7 - 76.0 % Final     Lymphocyte %   Date Value Ref Range Status   03/23/2024 41.7 19.6 - 45.3 % Final     Monocyte %   Date Value Ref Range Status   03/23/2024 5.3 5.0 - 12.0 % Final     Eosinophil %   Date Value Ref Range Status   03/23/2024 0.8 0.3 - 6.2 % Final     Basophil %   Date Value Ref Range Status   03/23/2024 0.8 0.0 - 1.5 % Final     Immature Grans %   Date Value Ref Range Status   03/23/2024 0.3 0.0 - 0.5 % Final     Neutrophils, Absolute   Date Value Ref Range Status   03/23/2024 5.49 1.70 - 7.00 10*3/mm3 Final     Lymphocytes, Absolute   Date Value Ref Range Status   03/23/2024 4.48 (H) 0.70 - 3.10 10*3/mm3 Final     Monocytes, Absolute   Date Value Ref Range Status   03/23/2024 0.57 0.10 - 0.90 10*3/mm3 Final     Eosinophils, Absolute   Date Value Ref Range Status   03/23/2024 0.09 0.00 - 0.40 10*3/mm3 Final     Basophils, Absolute   Date Value Ref Range Status   03/23/2024 0.09 0.00 - 0.20 10*3/mm3 Final     Immature Grans, Absolute   Date Value Ref Range Status   03/23/2024 0.03 0.00 - 0.05 10*3/mm3 Final     nRBC   Date Value Ref Range Status   03/23/2024 0.0 0.0 - 0.2 /100 WBC Final       Total Cholesterol   Date Value Ref Range Status   09/29/2022 178 0 - 200 mg/dL Final     Triglycerides    Date Value Ref Range Status   09/29/2022 69 0 - 150 mg/dL Final     HDL Cholesterol   Date Value Ref Range Status   09/29/2022 81 (H) 40 - 60 mg/dL Final     LDL Cholesterol    Date Value Ref Range Status   09/29/2022 84 0 - 100 mg/dL Final     IMAGING   XR Chest 2 View    Result Date: 4/18/2024    Probably atelectasis or scarring of left lower lung.   This report was finalized on 4/18/2024 9:52 AM by Dr. Adam Hall MD.      XR Femur 2 View Right    Result Date: 3/23/2024   1.  No right femur fracture.    To TALK to On Call [phone]  This report was finalized on 3/23/2024 3:05 PM by Gio Rodriguez MD.      XR Hip With or Without Pelvis 2 - 3 View Right    Result Date: 3/23/2024   1. No fracture or malalignment.    To TALK to On Call [phone]  This report was finalized on 3/23/2024 3:05 PM by Gio Rodriguez MD.      US Venous Doppler Lower Extremity Right (duplex)    Result Date: 3/23/2024   1. Negative for deep venous thrombosis in the right lower extremity.     To TALK to On Call [phone]  This report was finalized on 3/23/2024 2:58 PM by iGo Rodriguez MD.      US Pelvis Complete    Result Date: 3/20/2024    Bilateral ovaries not visualized.   This report was finalized on 3/20/2024 7:30 AM by Dr. Adam Hall MD.      CT Abdomen Pelvis With Contrast    Result Date: 3/6/2024    No acute findings in the abdomen or pelvis.   This report was finalized on 3/6/2024 2:40 PM by Dr. Adam Hall MD.        Echocardiogram   Interpretation Summary    This is a limited study for follow-up of pericardial effusion.  There is no Doppler available  Normal left ventricular cavity size with normal wall motion.  Left ventricular ejection fraction appears to be 61 - 65%. Left ventricular systolic function is normal.  Trace anterior echo-free space is noted which may represent trivial pericardial effusion adjacent to the right ventricle. it is significantly improved.            Assessment & Plan  Diagnoses and all orders for this visit:    1. Sinus tachycardia (Primary)  -     ECG 12 Lead  EKG reviewed and discussed, no acute change  Patient is tachycardic, recommend resuming low-dose metoprolol while monitoring BP and heart rate  Recommend avoidance of caffeine/stimulant    2. Hyperlipidemia, unspecified hyperlipidemia type  Continue on statin, no recent labs available for review, LDL goal .  Heart healthy diet, management through PCP    3. Abnormal EKG  EKG with nonspecific changes and intermittent symptoms, ischemic evaluation has been ordered in the past and continues to be recommended. Will reschedule.     4. Preoperative cardiovascular examination  Due to abnormal EKG and intermittent complaints, ischemic evaluation is recommended.  Patient would be very high risk due to nonspecific EKG changes, intermittent complaints without formal ischemic evaluation.  If patient elects to proceed we will be happy to arrange  Recommend optimum heart rate control prior to surgery    5. Tobacco use  Recommend avoidance of tobacco products    She mentions a persistent productive cough ongoing for several days, recommend follow-up with urgent care or primary.    Review of medical record    Lifestyle modifications including heart healthy diet, regular exercise, maintenance of desirable body weight and avoidance of tobacco products.    Follow-up in 3 months, sooner if needed.

## 2024-05-21 NOTE — TELEPHONE ENCOUNTER
Called pt to adivse her of message per SONIA WARNER.  She v/u.         Isabelle Bunn APRN P [facility]  Can you let patient know that we will need to arrange for a stress test prior to her surgery.  Order has been placed and we will follow up after stress test to review results.  Thanks,  Isabelle

## 2024-05-21 NOTE — TELEPHONE ENCOUNTER
Called pt to let her know she is not cleared for surgery and will need further testing as discussed in the office today.  A stress test has been ordered.   Also her surgeons office called to see if she was cleared.  I told the nurse she has not been cleared as she needed further testing as she is high risk.

## 2024-05-22 ENCOUNTER — TELEPHONE (OUTPATIENT)
Dept: CARDIOLOGY | Facility: CLINIC | Age: 59
End: 2024-05-22
Payer: COMMERCIAL

## 2024-05-22 NOTE — TELEPHONE ENCOUNTER
Left detailed message for Adalberto at Dr. Elizalde's Office, adv pt would not be cleared without testing and the pt would have to contact the hospital scheduling dept to see if they can complete it quicker.

## 2024-05-22 NOTE — TELEPHONE ENCOUNTER
Caller: MELANY    Gene call back number: [phone]     Who are you requesting to speak with (clinical staff, provider,  specific staff member): CLINICAL STAFF    Do you know the name of the person who called: MELANY    What was the call regarding: PATIENT'S SURGERY IS PENDING TESTING. THEY ARE REQUESTING THAT TESTING BE COMPLETED ASAP.

## 2024-05-28 ENCOUNTER — APPOINTMENT (OUTPATIENT)
Dept: GENERAL RADIOLOGY | Facility: HOSPITAL | Age: 59
End: 2024-05-28
Payer: COMMERCIAL

## 2024-05-28 ENCOUNTER — HOSPITAL ENCOUNTER (EMERGENCY)
Facility: HOSPITAL | Age: 59
Discharge: HOME | End: 2024-05-28
Attending: EMERGENCY MEDICINE
Payer: COMMERCIAL

## 2024-05-28 VITALS
RESPIRATION RATE: 16 BRPM | TEMPERATURE: 97.5 F | DIASTOLIC BLOOD PRESSURE: 69 MMHG | HEIGHT: 65 IN | BODY MASS INDEX: 22.99 KG/M2 | SYSTOLIC BLOOD PRESSURE: 144 MMHG | HEART RATE: 92 BPM | OXYGEN SATURATION: 95 % | WEIGHT: 138 LBS

## 2024-05-28 DIAGNOSIS — M84.353A STRESS FRACTURE OF NECK OF FEMUR, INITIAL ENCOUNTER: ICD-10-CM

## 2024-05-28 DIAGNOSIS — R07.9 CHEST PAIN WITH LOW RISK FOR CARDIAC ETIOLOGY: Primary | ICD-10-CM

## 2024-05-28 LAB
ALBUMIN SERPL-MCNC: 4.5 G/DL (ref 3.5–5.2)
ALBUMIN/GLOB SERPL: 1.3 G/DL
ALP SERPL-CCNC: 100 U/L (ref 39–117)
ALT SERPL W P-5'-P-CCNC: 13 U/L (ref 1–33)
ANION GAP SERPL CALCULATED.3IONS-SCNC: 12 MMOL/L (ref 5–15)
AST SERPL-CCNC: 21 U/L (ref 1–32)
BASOPHILS # BLD AUTO: 0.1 10*3/MM3 (ref 0–0.2)
BASOPHILS NFR BLD AUTO: 0.8 % (ref 0–1.5)
BILIRUB SERPL-MCNC: 0.3 MG/DL (ref 0–1.2)
BUN SERPL-MCNC: 6 MG/DL (ref 6–20)
BUN/CREAT SERPL: 8.3 (ref 7–25)
CALCIUM SPEC-SCNC: 9.6 MG/DL (ref 8.6–10.5)
CHLORIDE SERPL-SCNC: 103 MMOL/L (ref 98–107)
CO2 SERPL-SCNC: 25 MMOL/L (ref 22–29)
CREAT SERPL-MCNC: 0.72 MG/DL (ref 0.57–1)
D DIMER PPP FEU-MCNC: <0.27 MCGFEU/ML (ref 0–0.56)
DEPRECATED RDW RBC AUTO: 47.3 FL (ref 37–54)
EGFRCR SERPLBLD CKD-EPI 2021: 98.3 ML/MIN/1.73
EOSINOPHIL # BLD AUTO: 0.11 10*3/MM3 (ref 0–0.4)
EOSINOPHIL NFR BLD AUTO: 0.9 % (ref 0.3–6.2)
ERYTHROCYTE [DISTWIDTH] IN BLOOD BY AUTOMATED COUNT: 12.4 % (ref 12.3–15.4)
GEN 5 2HR TROPONIN T REFLEX: <6 NG/L
GLOBULIN UR ELPH-MCNC: 3.6 GM/DL
GLUCOSE SERPL-MCNC: 108 MG/DL (ref 65–99)
HCT VFR BLD AUTO: 45.8 % (ref 34–46.6)
HGB BLD-MCNC: 15.5 G/DL (ref 12–15.9)
HOLD SPECIMEN: NORMAL
HOLD SPECIMEN: NORMAL
IMM GRANULOCYTES # BLD AUTO: 0.04 10*3/MM3 (ref 0–0.05)
IMM GRANULOCYTES NFR BLD AUTO: 0.3 % (ref 0–0.5)
LYMPHOCYTES # BLD AUTO: 5.35 10*3/MM3 (ref 0.7–3.1)
LYMPHOCYTES NFR BLD AUTO: 41.8 % (ref 19.6–45.3)
MAGNESIUM SERPL-MCNC: 1.8 MG/DL (ref 1.6–2.6)
MCH RBC QN AUTO: 35 PG (ref 26.6–33)
MCHC RBC AUTO-ENTMCNC: 33.8 G/DL (ref 31.5–35.7)
MCV RBC AUTO: 103.4 FL (ref 79–97)
MONOCYTES # BLD AUTO: 0.6 10*3/MM3 (ref 0.1–0.9)
MONOCYTES NFR BLD AUTO: 4.7 % (ref 5–12)
NEUTROPHILS NFR BLD AUTO: 51.5 % (ref 42.7–76)
NEUTROPHILS NFR BLD AUTO: 6.59 10*3/MM3 (ref 1.7–7)
NRBC BLD AUTO-RTO: 0 /100 WBC (ref 0–0.2)
NT-PROBNP SERPL-MCNC: <36 PG/ML (ref 0–900)
PLATELET # BLD AUTO: 373 10*3/MM3 (ref 140–450)
PMV BLD AUTO: 9.3 FL (ref 6–12)
POTASSIUM SERPL-SCNC: 4.4 MMOL/L (ref 3.5–5.2)
PROT SERPL-MCNC: 8.1 G/DL (ref 6–8.5)
QT INTERVAL: 332 MS
QTC INTERVAL: 428 MS
RBC # BLD AUTO: 4.43 10*6/MM3 (ref 3.77–5.28)
SODIUM SERPL-SCNC: 140 MMOL/L (ref 136–145)
TROPONIN T DELTA: NORMAL
TROPONIN T SERPL HS-MCNC: 6 NG/L
TSH SERPL DL<=0.05 MIU/L-ACNC: 1.77 UIU/ML (ref 0.27–4.2)
WBC NRBC COR # BLD AUTO: 12.79 10*3/MM3 (ref 3.4–10.8)
WHOLE BLOOD HOLD COAG: NORMAL
WHOLE BLOOD HOLD SPECIMEN: NORMAL

## 2024-05-28 PROCEDURE — 85379 FIBRIN DEGRADATION QUANT: CPT | Performed by: EMERGENCY MEDICINE

## 2024-05-28 PROCEDURE — 71045 X-RAY EXAM CHEST 1 VIEW: CPT

## 2024-05-28 PROCEDURE — 83880 ASSAY OF NATRIURETIC PEPTIDE: CPT | Performed by: NURSE PRACTITIONER

## 2024-05-28 PROCEDURE — 71045 X-RAY EXAM CHEST 1 VIEW: CPT | Performed by: RADIOLOGY

## 2024-05-28 PROCEDURE — 36415 COLL VENOUS BLD VENIPUNCTURE: CPT

## 2024-05-28 PROCEDURE — 93010 ELECTROCARDIOGRAM REPORT: CPT | Performed by: SPECIALIST

## 2024-05-28 PROCEDURE — 83735 ASSAY OF MAGNESIUM: CPT | Performed by: NURSE PRACTITIONER

## 2024-05-28 PROCEDURE — 84484 ASSAY OF TROPONIN QUANT: CPT | Performed by: NURSE PRACTITIONER

## 2024-05-28 PROCEDURE — 80050 GENERAL HEALTH PANEL: CPT | Performed by: NURSE PRACTITIONER

## 2024-05-28 PROCEDURE — 93005 ELECTROCARDIOGRAM TRACING: CPT | Performed by: NURSE PRACTITIONER

## 2024-05-28 PROCEDURE — 99284 EMERGENCY DEPT VISIT MOD MDM: CPT

## 2024-05-28 NOTE — ED NOTES
MEDICAL SCREENING:    Reason for Visit: Chest pain    Patient initially seen in triage.  The patient was advised further evaluation and diagnostic testing will be needed, some of the treatment and testing will be initiated in the lobby in order to begin the process.  The patient will be returned to the waiting area for the time being and possibly be re-assessed by a subsequent ED provider.  The patient will be brought back to the treatment area in as timely manner as possible.     Jennifer Benitez, APRN  05/28/24 1134

## 2024-05-28 NOTE — ED PROVIDER NOTES
Subjective   History of Present Illness  58-year-old white female presents with chest pain.  Patient complains of left anterior lateral chest pain in the pectoral area at the anterior axillary line.  This is nonexertional and she denies any shortness of breath, nausea, diaphoresis, or radiation of the pain.  She has a stress fracture of her femoral neck and is planned to have repair by Dr. Jaeger, but is awaiting a stress test for cardiac clearance prior to that.  She has been in a wheelchair over the past few weeks.  She says she sees a cardiologist due to history of chest pain.  Review of her records showed that she has also had a history of SVT.  She does not have any history of coronary artery disease.  She denies any other complaints at this time.      Review of Systems   All other systems reviewed and are negative.      Past Medical History:   Diagnosis Date    Anxiety 06/25/2018    Arthritis     COPD (chronic obstructive pulmonary disease)     Elevated cholesterol     Fibromyalgia     Migraines     RSD (reflex sympathetic dystrophy)        Allergies   Allergen Reactions    Floxin [Ofloxacin]        No past surgical history on file.    Family History   Problem Relation Age of Onset    Hypertension Mother     Hypertension Father     Heart disease Sister     Diabetes Sister     Breast cancer Neg Hx        Social History     Socioeconomic History    Marital status:    Tobacco Use    Smoking status: Every Day     Types: Cigarettes    Smokeless tobacco: Never    Tobacco comments:     smoked about 2 ppd for about 15 years.     Vaping Use    Vaping status: Never Used   Substance and Sexual Activity    Alcohol use: No    Drug use: No    Sexual activity: Defer           Objective   Physical Exam  Vitals and nursing note reviewed.   Constitutional:       General: She is not in acute distress.     Appearance: Normal appearance. She is not diaphoretic.   HENT:      Head: Normocephalic and atraumatic.   Eyes:       Conjunctiva/sclera: Conjunctivae normal.   Neck:      Vascular: No JVD.      Trachea: No tracheal deviation.   Cardiovascular:      Rate and Rhythm: Normal rate and regular rhythm. No extrasystoles are present.     Heart sounds: Normal heart sounds. No murmur heard.     No friction rub. No gallop.   Pulmonary:      Effort: Pulmonary effort is normal. No respiratory distress.      Breath sounds: Normal breath sounds. No wheezing, rhonchi or rales.   Chest:      Chest wall: No tenderness.   Abdominal:      General: Abdomen is flat. Bowel sounds are normal.      Palpations: Abdomen is soft.      Tenderness: There is no abdominal tenderness.   Musculoskeletal:         General: Normal range of motion.      Cervical back: Neck supple.      Right lower leg: No edema.      Left lower leg: No edema.   Skin:     General: Skin is warm and dry.      Coloration: Skin is not pale.   Neurological:      General: No focal deficit present.      Mental Status: She is alert and oriented to person, place, and time.   Psychiatric:         Mood and Affect: Mood normal.         Behavior: Behavior normal.         Procedures  Results for orders placed or performed during the hospital encounter of 05/28/24   Comprehensive Metabolic Panel    Specimen: Blood   Result Value Ref Range    Glucose 108 (H) 65 - 99 mg/dL    BUN 6 6 - 20 mg/dL    Creatinine 0.72 0.57 - 1.00 mg/dL    Sodium 140 136 - 145 mmol/L    Potassium 4.4 3.5 - 5.2 mmol/L    Chloride 103 98 - 107 mmol/L    CO2 25.0 22.0 - 29.0 mmol/L    Calcium 9.6 8.6 - 10.5 mg/dL    Total Protein 8.1 6.0 - 8.5 g/dL    Albumin 4.5 3.5 - 5.2 g/dL    ALT (SGPT) 13 1 - 33 U/L    AST (SGOT) 21 1 - 32 U/L    Alkaline Phosphatase 100 39 - 117 U/L    Total Bilirubin 0.3 0.0 - 1.2 mg/dL    Globulin 3.6 gm/dL    A/G Ratio 1.3 g/dL    BUN/Creatinine Ratio 8.3 7.0 - 25.0    Anion Gap 12.0 5.0 - 15.0 mmol/L    eGFR 98.3 >60.0 mL/min/1.73   High Sensitivity Troponin T    Specimen: Blood   Result Value Ref  Range    HS Troponin T 6 <14 ng/L   BNP    Specimen: Blood   Result Value Ref Range    proBNP <36.0 0.0 - 900.0 pg/mL   Magnesium    Specimen: Blood   Result Value Ref Range    Magnesium 1.8 1.6 - 2.6 mg/dL   TSH    Specimen: Blood   Result Value Ref Range    TSH 1.770 0.270 - 4.200 uIU/mL   CBC Auto Differential    Specimen: Blood   Result Value Ref Range    WBC 12.79 (H) 3.40 - 10.80 10*3/mm3    RBC 4.43 3.77 - 5.28 10*6/mm3    Hemoglobin 15.5 12.0 - 15.9 g/dL    Hematocrit 45.8 34.0 - 46.6 %    .4 (H) 79.0 - 97.0 fL    MCH 35.0 (H) 26.6 - 33.0 pg    MCHC 33.8 31.5 - 35.7 g/dL    RDW 12.4 12.3 - 15.4 %    RDW-SD 47.3 37.0 - 54.0 fl    MPV 9.3 6.0 - 12.0 fL    Platelets 373 140 - 450 10*3/mm3    Neutrophil % 51.5 42.7 - 76.0 %    Lymphocyte % 41.8 19.6 - 45.3 %    Monocyte % 4.7 (L) 5.0 - 12.0 %    Eosinophil % 0.9 0.3 - 6.2 %    Basophil % 0.8 0.0 - 1.5 %    Immature Grans % 0.3 0.0 - 0.5 %    Neutrophils, Absolute 6.59 1.70 - 7.00 10*3/mm3    Lymphocytes, Absolute 5.35 (H) 0.70 - 3.10 10*3/mm3    Monocytes, Absolute 0.60 0.10 - 0.90 10*3/mm3    Eosinophils, Absolute 0.11 0.00 - 0.40 10*3/mm3    Basophils, Absolute 0.10 0.00 - 0.20 10*3/mm3    Immature Grans, Absolute 0.04 0.00 - 0.05 10*3/mm3    nRBC 0.0 0.0 - 0.2 /100 WBC   D-dimer, Quantitative    Specimen: Blood   Result Value Ref Range    D-Dimer, Quantitative <0.27 0.00 - 0.56 MCGFEU/mL   High Sensitivity Troponin T 2Hr    Specimen: Blood   Result Value Ref Range    HS Troponin T <6 <14 ng/L    Troponin T Delta     ECG 12 Lead Chest Pain   Result Value Ref Range    QT Interval 332 ms    QTC Interval 428 ms   Green Top (Gel)   Result Value Ref Range    Extra Tube Hold for add-ons.    Lavender Top   Result Value Ref Range    Extra Tube hold for add-on    Gold Top - SST   Result Value Ref Range    Extra Tube Hold for add-ons.    Light Blue Top   Result Value Ref Range    Extra Tube Hold for add-ons.      XR Chest 1 View    Result Date:  5/28/2024  Narrative: PROCEDURE: XR CHEST 1 VW-   HISTORY: CP  COMPARISON: 5/21/2024.  FINDINGS: The heart is normal in size. The mediastinum is unremarkable. The lungs are clear. There is no pneumothorax. There are no acute osseous abnormalities.      Impression: No acute cardiopulmonary process.   This report was finalized on 5/28/2024 11:58 AM by Helena Sparks M.D..      XR Chest 2 View    Result Date: 5/21/2024  Narrative: EXAMINATION: XR CHEST 2 VW-  CLINICAL INDICATION: R06.2; R06.2-Wheezing   COMPARISON: 4/18/2024  TECHNIQUE: XR CHEST 2 VW-  FINDINGS: LUNGS: Lungs are adequately aerated.  HEART AND MEDIASTINUM: Heart and mediastinal contours are unremarkable   SKELETON: Bony and soft tissue structures are unremarkable.        Impression: No radiographic evidence of acute cardiac or pulmonary disease.   This report was finalized on 5/21/2024 2:25 PM by Dr. Daniel Juárez MD.              ED Course  ED Course as of 05/28/24 1557   Tue May 28, 2024   1147 ECG 12 Lead Chest Pain  Normal sinus rhythm.  Rate 100.  Normal axis.  Normal QT intervals.  Incomplete right bundle branch block.  Inferior T wave inversions.  No ST elevation or depression.  Abnormal EKG.  Interpreted by me.  Electronically signed by Gino Harmon MD, 05/28/24, 11:48 AM EDT.   [BC]   1552 Asymptomatic at this time.  Workup was unremarkable.  Patient has stress test scheduled for preop in 6 days.  Admission not indicated at this time.  She understands return for further chest pain. [BC]      ED Course User Index  [BC] Gino Harmon MD                HEART Score: 2                              Medical Decision Making  Problems Addressed:  Chest pain with low risk for cardiac etiology: complicated acute illness or injury  Stress fracture of neck of femur, initial encounter: complicated acute illness or injury    Amount and/or Complexity of Data Reviewed  Labs: ordered.  Radiology: ordered.  ECG/medicine tests: ordered. Decision-making  details documented in ED Course.        Final diagnoses:   Chest pain with low risk for cardiac etiology   Stress fracture of neck of femur, initial encounter       ED Disposition  ED Disposition       ED Disposition   Discharge    Condition   Stable    Comment   --               vYonne Marr MD  [address]  [facility] [id number] [phone]    In 1 week      Arash Elizalde, DO  [address] [facility] [id number] [phone]      As scheduled    Stress test    In 6 days  As scheduled         Medication List      No changes were made to your prescriptions during this visit.            Gino Harmon MD  05/28/24 0292

## 2024-05-29 ENCOUNTER — TELEPHONE (OUTPATIENT)
Dept: CARDIOLOGY | Facility: CLINIC | Age: 59
End: 2024-05-29

## 2024-05-29 NOTE — TELEPHONE ENCOUNTER
"  Caller: Basilia Chamberlain \"Coco\"    Relationship: Self    Best call back number: [phone]    What is the best time to reach you: ANYTIME    Who are you requesting to speak with (clinical staff, provider,  specific staff member): CLINICAL    Do you know the name of the person who called: N/A    What was the call regarding: PATIENT WAS IN ER 5-28-24 FOR CHEST PAIN. PATIENT HAD BLOOD WORK AND EKG AND CHEST X RAY; EVERYTHING CAME BACK GOOD. PATIENT IS DUE TO HAVE HIP SURGERY AND IS NEEDING CARDIAC CLEARANCE AND ALANA GARCIA WANTED PATIENT TO HAVE STRESS TEST BEFORE CLEARING HER FOR SURGERY. PATIENT IS WANTING TO KNOW IF TESTING SHE HAD DONE AT HOSPITAL WOULD BE ENOUGH TO CLEAR HER. PLEASE REACH OUT. PATIENT STATES BLOOD COUNT AT ER WAS 12.79 AND WOULD LIKE TO KNOW IF THAT IS HIGH.    Is it okay if the provider responds through [employer]: CALL        "

## 2024-05-29 NOTE — TELEPHONE ENCOUNTER
"  Caller: Basilia Chamberlain \"Coco\"    Relationship: Self    Best call back number: [phone]     What is the best time to reach you: ANYTIME     What was the call regarding: WAS REQUESTING A TELEHEALTH APPT TODAY TO DISCUSS MESSAGE BELOW, OFFICE STATES WE DON'T DO TELEHEALTH APPTS. PT IS REQUESTING A CALL BACK TO DISCUSS ASAP.     Is it okay if the provider responds through MyChart: CALL   "

## 2024-05-31 ENCOUNTER — LAB (OUTPATIENT)
Dept: UROLOGY | Facility: CLINIC | Age: 59
End: 2024-05-31
Payer: COMMERCIAL

## 2024-05-31 DIAGNOSIS — R31.29 OTHER MICROSCOPIC HEMATURIA: ICD-10-CM

## 2024-05-31 DIAGNOSIS — R10.2 PELVIC PAIN: Primary | ICD-10-CM

## 2024-05-31 PROCEDURE — 87086 URINE CULTURE/COLONY COUNT: CPT

## 2024-06-02 LAB — BACTERIA SPEC AEROBE CULT: NO GROWTH

## 2024-06-03 ENCOUNTER — TELEPHONE (OUTPATIENT)
Dept: UROLOGY | Facility: CLINIC | Age: 59
End: 2024-06-03
Payer: COMMERCIAL

## 2024-06-03 NOTE — TELEPHONE ENCOUNTER
I called the pt letting her know that her urine culture was negative for any bacterial infection. The pt verbalized understanding.                  ----- Message from Sanchez Larry sent at 6/3/2024  9:39 AM EDT -----  Please let patient know that urine culture showed no infection. Thanks.  ----- Message -----  From: Lab, Background User  Sent: 6/2/2024   9:22 AM EDT  To: Sanchez Larry PA-C

## 2024-06-06 ENCOUNTER — HOSPITAL ENCOUNTER (OUTPATIENT)
Dept: NUCLEAR MEDICINE | Facility: HOSPITAL | Age: 59
Discharge: HOME | End: 2024-06-06
Payer: COMMERCIAL

## 2024-06-06 ENCOUNTER — HOSPITAL ENCOUNTER (OUTPATIENT)
Dept: CARDIOLOGY | Facility: HOSPITAL | Age: 59
Discharge: HOME | End: 2024-06-06
Payer: COMMERCIAL

## 2024-06-06 DIAGNOSIS — Z91.89 MULTIPLE RISK FACTORS FOR CORONARY ARTERY DISEASE: ICD-10-CM

## 2024-06-06 DIAGNOSIS — Z01.810 PREOPERATIVE CARDIOVASCULAR EXAMINATION: ICD-10-CM

## 2024-06-06 DIAGNOSIS — R94.31 ABNORMAL EKG: ICD-10-CM

## 2024-06-06 LAB
BH CV NUCLEAR PRIOR STUDY: 3
BH CV REST NUCLEAR ISOTOPE DOSE: 10.2 MCI
BH CV STRESS BP STAGE 1: NORMAL
BH CV STRESS COMMENTS STAGE 1: NORMAL
BH CV STRESS DOSE REGADENOSON STAGE 1: 0.4
BH CV STRESS DURATION MIN STAGE 1: 0
BH CV STRESS DURATION SEC STAGE 1: 10
BH CV STRESS HR STAGE 1: 126
BH CV STRESS NUCLEAR ISOTOPE DOSE: 32.1 MCI
BH CV STRESS PROTOCOL 1: NORMAL
BH CV STRESS RECOVERY BP: NORMAL MMHG
BH CV STRESS RECOVERY HR: 105 BPM
BH CV STRESS STAGE 1: 1
LV EF NUC BP: 67 %
MAXIMAL PREDICTED HEART RATE: 164 BPM
PERCENT MAX PREDICTED HR: 76.83 %
STRESS BASELINE BP: NORMAL MMHG
STRESS BASELINE HR: 98 BPM
STRESS PERCENT HR: 90 %
STRESS POST PEAK BP: NORMAL MMHG
STRESS POST PEAK HR: 126 BPM
STRESS TARGET HR: 139 BPM

## 2024-06-06 PROCEDURE — 0 TECHNETIUM SESTAMIBI: Performed by: NURSE PRACTITIONER

## 2024-06-06 PROCEDURE — A9500 TC99M SESTAMIBI: HCPCS | Performed by: NURSE PRACTITIONER

## 2024-06-06 PROCEDURE — 25010000002 REGADENOSON 0.4 MG/5ML SOLUTION: Performed by: NURSE PRACTITIONER

## 2024-06-06 PROCEDURE — 93017 CV STRESS TEST TRACING ONLY: CPT

## 2024-06-06 PROCEDURE — 78452 HT MUSCLE IMAGE SPECT MULT: CPT

## 2024-06-06 RX ORDER — REGADENOSON 0.08 MG/ML
0.4 INJECTION, SOLUTION INTRAVENOUS
Status: COMPLETED | OUTPATIENT
Start: 2024-06-06 | End: 2024-06-06

## 2024-06-06 RX ADMIN — TECHNETIUM TC 99M SESTAMIBI 1 DOSE: 1 INJECTION INTRAVENOUS at 08:35

## 2024-06-06 RX ADMIN — TECHNETIUM TC 99M SESTAMIBI 1 DOSE: 1 INJECTION INTRAVENOUS at 07:38

## 2024-06-06 RX ADMIN — REGADENOSON 0.4 MG: 0.08 INJECTION, SOLUTION INTRAVENOUS at 08:35

## 2024-06-07 ENCOUNTER — TELEPHONE (OUTPATIENT)
Dept: CARDIOLOGY | Facility: CLINIC | Age: 59
End: 2024-06-07
Payer: COMMERCIAL

## 2024-06-10 ENCOUNTER — TELEPHONE (OUTPATIENT)
Dept: CARDIOLOGY | Facility: CLINIC | Age: 59
End: 2024-06-10
Payer: COMMERCIAL

## 2024-06-10 NOTE — TELEPHONE ENCOUNTER
Adv pt of Naz[facility] message regarding stress test and sx clearance    Scheduled pt for 6/11 at 9 AM    ----- Message from Isabelle Bunn sent at 6/10/2024 10:08 AM EDT -----  Stress test with a normal myocardial perfusion study with no evidence of ischemia, low probability study. Continue to monitor and if new or worsening symptoms further evaluation would be warranted.     Recommend office visit tomorrow or Wednesday to discuss and complete paperwork, pt will also need a repeat EKG to check HR.

## 2024-06-11 ENCOUNTER — OFFICE VISIT (OUTPATIENT)
Dept: CARDIOLOGY | Facility: CLINIC | Age: 59
End: 2024-06-11
Payer: COMMERCIAL

## 2024-06-11 VITALS
HEART RATE: 93 BPM | BODY MASS INDEX: 22.99 KG/M2 | WEIGHT: 138 LBS | HEIGHT: 65 IN | SYSTOLIC BLOOD PRESSURE: 116 MMHG | OXYGEN SATURATION: 96 % | DIASTOLIC BLOOD PRESSURE: 72 MMHG

## 2024-06-11 DIAGNOSIS — E78.5 HYPERLIPIDEMIA, UNSPECIFIED HYPERLIPIDEMIA TYPE: ICD-10-CM

## 2024-06-11 DIAGNOSIS — I47.10 PSVT (PAROXYSMAL SUPRAVENTRICULAR TACHYCARDIA): Primary | ICD-10-CM

## 2024-06-11 DIAGNOSIS — Z91.89 MULTIPLE RISK FACTORS FOR CORONARY ARTERY DISEASE: ICD-10-CM

## 2024-06-11 DIAGNOSIS — Z01.810 PREOPERATIVE CARDIOVASCULAR EXAMINATION: ICD-10-CM

## 2024-06-11 DIAGNOSIS — Z72.0 TOBACCO USE: ICD-10-CM

## 2024-06-11 PROCEDURE — 1160F RVW MEDS BY RX/DR IN RCRD: CPT | Performed by: NURSE PRACTITIONER

## 2024-06-11 PROCEDURE — 93000 ELECTROCARDIOGRAM COMPLETE: CPT | Performed by: NURSE PRACTITIONER

## 2024-06-11 PROCEDURE — 1159F MED LIST DOCD IN RCRD: CPT | Performed by: NURSE PRACTITIONER

## 2024-06-11 PROCEDURE — 99214 OFFICE O/P EST MOD 30 MIN: CPT | Performed by: NURSE PRACTITIONER

## 2024-06-11 NOTE — PROGRESS NOTES
"Subjective     Basilia Coco Chamberlain is a 58 y.o. female.   Chief Complaint   Patient presents with    Surgical Clearance      History of Present Illness   Basilia \"Gurpreet Chamberlain is a 58-year-old female who presents to clinic today for cardiology follow-up.  She is accompanied by her .     Due to chest discomfort and surgery clearance she underwent cardiovascular testing which she would like to discuss today.  She denies worsening chest pain, dyspnea, palpitations or syncope.  She reports discontinuing her aspirin for anticipation of surgery. She denies underlying history of DM. Orthopedic is planning a hip surgery. She was seen at [facility] for chest pain on 5/28/202       Hyperlipidemia currently on Lipitor 20 mg daily.  She reports compliance with medicine and denies medicine side effects. No labs available for review.      Paroxysmal SVT was previously on Toprol XL 12.5 mg daily.  She reports her BP was soft therefore she self discontinued her Toprol.  She has been off medication for some time and denies worsening palpitations, tachycardia, bradycardia, dizziness or syncope.  It was recommended at last visit she reinitiate metoprolol but she denies starting as recommended.  She reports heart rate continues to run intermittently fast at times with intermittent palpitations.      She is a smoker with underlying COPD and is managed by PCP.     Patient Active Problem List   Diagnosis    Abnormal mammogram    Breast mass    Breast cyst    Centrilobular emphysema    Encounter for screening for malignant neoplasm of colon    Hyperlipidemia    Paroxysmal SVT (supraventricular tachycardia)    Pericardial effusion    Pedal edema    Other microscopic hematuria     Past Medical History:   Diagnosis Date    Anxiety 06/25/2018    Arthritis     COPD (chronic obstructive pulmonary disease)     Elevated cholesterol     Fibromyalgia     Migraines     RSD (reflex sympathetic dystrophy)      History reviewed. No pertinent " surgical history.    Family History   Problem Relation Age of Onset    Hypertension Mother     Hypertension Father     Heart disease Sister     Diabetes Sister     Breast cancer Neg Hx      Social History     Tobacco Use    Smoking status: Every Day     Types: Cigarettes     Passive exposure: Past    Smokeless tobacco: Never    Tobacco comments:     smoked about 2 ppd for about 15 years.     Vaping Use    Vaping status: Never Used   Substance Use Topics    Alcohol use: No    Drug use: No     The following portions of the patient's history were reviewed and updated as appropriate: allergies, current medications, past family history, past medical history, past social history, past surgical history and problem list.    Allergies   Allergen Reactions    Floxin [Ofloxacin]        Current Outpatient Medications:     acetaminophen (TYLENOL) 500 MG tablet, Take 1 tablet by mouth Every 6 (Six) Hours As Needed for Mild Pain., Disp: , Rfl:     albuterol sulfate  (90 Base) MCG/ACT inhaler, Inhale 2 puffs Every 4 (Four) Hours As Needed for Wheezing., Disp: 18 g, Rfl: 5    ALPRAZolam (XANAX) 2 MG tablet, Take 1 tablet by mouth., Disp: , Rfl:     amitriptyline (ELAVIL) 25 MG tablet, , Disp: , Rfl:     aspirin 81 MG EC tablet, Take 1 tablet by mouth Daily., Disp: 90 tablet, Rfl: 1    atorvastatin (LIPITOR) 20 MG tablet, Take 1 tablet by mouth Daily., Disp: 90 tablet, Rfl: 1    diclofenac (VOLTAREN) 75 MG EC tablet, Take 1 tablet by mouth 2 (Two) Times a Day As Needed (pain)., Disp: 12 tablet, Rfl: 0    fluticasone (FLOVENT HFA) 220 MCG/ACT inhaler, Inhale 2 puffs 2 (Two) Times a Day., Disp: 12 g, Rfl: 5    ipratropium-albuterol (DUO-NEB) 0.5-2.5 mg/3 ml nebulizer, Take 3 mL by nebulization 4 (Four) Times a Day As Needed for Wheezing or Shortness of Air., Disp: 120 mL, Rfl: 5    methocarbamol (ROBAXIN) 750 MG tablet, Take 1 tablet by mouth 4 (Four) Times a Day As Needed for Muscle Spasms., Disp: , Rfl:     O2 (OXYGEN), Inhale  2 L/min Every Night., Disp: , Rfl:     Stiolto Respimat 2.5-2.5 MCG/ACT aerosol solution inhaler, Inhale 2 puffs Daily., Disp: 4 g, Rfl: 5    vitamin D (ERGOCALCIFEROL) 1.25 MG (47200 UT) capsule capsule, Take 1 capsule by mouth 1 (One) Time Per Week., Disp: , Rfl:     cefdinir (OMNICEF) 300 MG capsule, Take 1 capsule by mouth 2 (Two) Times a Day. (Patient not taking: Reported on 5/21/2024), Disp: 14 capsule, Rfl: 0    hydroCHLOROthiazide (HYDRODIURIL) 25 MG tablet, Take 0.5 tablets by mouth Daily. (Patient not taking: Reported on 5/21/2024), Disp: 45 tablet, Rfl: 1    metoprolol succinate XL (TOPROL-XL) 25 MG 24 hr tablet, Take 0.5 tablets by mouth 2 (Two) Times a Day. (Patient not taking: Reported on 5/21/2024), Disp: 90 tablet, Rfl: 1    nystatin (MYCOSTATIN) 100,000 unit/mL suspension, SWISH AND SWALLOW 5 ML BY MOUTH FOUR TIMES DAILY (Patient not taking: Reported on 5/21/2024), Disp: 60 mL, Rfl: 1    Review of Systems   Constitutional:  Negative for activity change, appetite change, chills, diaphoresis, fatigue and fever.   HENT:  Negative for congestion, drooling, ear discharge, ear pain, mouth sores, nosebleeds, postnasal drip, rhinorrhea, sinus pressure, sneezing and sore throat.    Eyes:  Negative for pain, discharge and visual disturbance.   Respiratory:  Negative for cough, chest tightness, shortness of breath and wheezing.    Cardiovascular:  Negative for chest pain, palpitations and leg swelling.   Gastrointestinal:  Negative for abdominal pain, constipation, diarrhea, nausea and vomiting.   Endocrine: Negative for cold intolerance, heat intolerance, polydipsia, polyphagia and polyuria.   Musculoskeletal:  Negative for arthralgias, myalgias and neck pain.   Skin:  Negative for rash and wound.   Neurological:  Negative for dizziness, syncope, speech difficulty, weakness, light-headedness and headaches.   Hematological:  Negative for adenopathy. Does not bruise/bleed easily.   Psychiatric/Behavioral:   "Negative for confusion, dysphoric mood and sleep disturbance. The patient is not nervous/anxious.    All other systems reviewed and are negative.    /72 (BP Location: Left arm, Patient Position: Sitting, Cuff Size: Adult)   Pulse 93   Ht 165.1 cm (65\")   Wt 62.6 kg (138 lb)   LMP  (LMP Unknown)   SpO2 96%   BMI 22.96 kg/m²     Objective   Allergies   Allergen Reactions    Floxin [Ofloxacin]        Physical Exam  Vitals reviewed.   Constitutional:       Appearance: Normal appearance. She is well-developed.      Comments: In wheelchair    HENT:      Head: Normocephalic.      Right Ear: Tympanic membrane normal.      Left Ear: Tympanic membrane normal.      Nose: Nose normal.   Eyes:      Conjunctiva/sclera: Conjunctivae normal.      Pupils: Pupils are equal, round, and reactive to light.   Neck:      Thyroid: No thyromegaly.      Vascular: No carotid bruit or JVD.   Cardiovascular:      Rate and Rhythm: Normal rate and regular rhythm.   Pulmonary:      Effort: Pulmonary effort is normal.      Breath sounds: Normal breath sounds.   Abdominal:      General: Bowel sounds are normal.      Palpations: Abdomen is soft. There is no hepatomegaly, splenomegaly or mass.      Tenderness: There is no abdominal tenderness.   Musculoskeletal:         General: Normal range of motion.      Cervical back: Normal range of motion and neck supple.      Right lower leg: No edema.      Left lower leg: No edema.   Skin:     General: Skin is warm and dry.   Neurological:      Mental Status: She is alert and oriented to person, place, and time.   Psychiatric:         Attention and Perception: Attention normal.         Mood and Affect: Mood normal.         Speech: Speech normal.         Behavior: Behavior normal. Behavior is cooperative.         Cognition and Memory: Cognition normal.           ECG 12 Lead    Date/Time: 6/11/2024 1:11 PM  Performed by: Isabelle Bunn APRN    Authorized by: Isabelle Bunn APRN  Comparison: " compared with previous ECG   Similar to previous ECG  Comparison to previous ECG: ST changes in v4, V5 and V6 have imporved   Rhythm: sinus rhythm  Rate: normal  BPM: 86  Conduction: non-specific intraventricular conduction delay  Other findings: non-specific ST-T wave changes    Clinical impression: non-specific ECG  Comments: QT/qTc - 351/394          LABS  WBC   Date Value Ref Range Status   05/28/2024 12.79 (H) 3.40 - 10.80 10*3/mm3 Final     RBC   Date Value Ref Range Status   05/28/2024 4.43 3.77 - 5.28 10*6/mm3 Final     Hemoglobin   Date Value Ref Range Status   05/28/2024 15.5 12.0 - 15.9 g/dL Final     Hematocrit   Date Value Ref Range Status   05/28/2024 45.8 34.0 - 46.6 % Final     MCV   Date Value Ref Range Status   05/28/2024 103.4 (H) 79.0 - 97.0 fL Final     MCH   Date Value Ref Range Status   05/28/2024 35.0 (H) 26.6 - 33.0 pg Final     MCHC   Date Value Ref Range Status   05/28/2024 33.8 31.5 - 35.7 g/dL Final     RDW   Date Value Ref Range Status   05/28/2024 12.4 12.3 - 15.4 % Final     RDW-SD   Date Value Ref Range Status   05/28/2024 47.3 37.0 - 54.0 fl Final     MPV   Date Value Ref Range Status   05/28/2024 9.3 6.0 - 12.0 fL Final     Platelets   Date Value Ref Range Status   05/28/2024 373 140 - 450 10*3/mm3 Final     Neutrophil %   Date Value Ref Range Status   05/28/2024 51.5 42.7 - 76.0 % Final     Lymphocyte %   Date Value Ref Range Status   05/28/2024 41.8 19.6 - 45.3 % Final     Monocyte %   Date Value Ref Range Status   05/28/2024 4.7 (L) 5.0 - 12.0 % Final     Eosinophil %   Date Value Ref Range Status   05/28/2024 0.9 0.3 - 6.2 % Final     Basophil %   Date Value Ref Range Status   05/28/2024 0.8 0.0 - 1.5 % Final     Immature Grans %   Date Value Ref Range Status   05/28/2024 0.3 0.0 - 0.5 % Final     Neutrophils, Absolute   Date Value Ref Range Status   05/28/2024 6.59 1.70 - 7.00 10*3/mm3 Final     Lymphocytes, Absolute   Date Value Ref Range Status   05/28/2024 5.35 (H) 0.70 -  3.10 10*3/mm3 Final     Monocytes, Absolute   Date Value Ref Range Status   05/28/2024 0.60 0.10 - 0.90 10*3/mm3 Final     Eosinophils, Absolute   Date Value Ref Range Status   05/28/2024 0.11 0.00 - 0.40 10*3/mm3 Final     Basophils, Absolute   Date Value Ref Range Status   05/28/2024 0.10 0.00 - 0.20 10*3/mm3 Final     Immature Grans, Absolute   Date Value Ref Range Status   05/28/2024 0.04 0.00 - 0.05 10*3/mm3 Final     nRBC   Date Value Ref Range Status   05/28/2024 0.0 0.0 - 0.2 /100 WBC Final       Total Cholesterol   Date Value Ref Range Status   09/29/2022 178 0 - 200 mg/dL Final     Triglycerides   Date Value Ref Range Status   09/29/2022 69 0 - 150 mg/dL Final     HDL Cholesterol   Date Value Ref Range Status   09/29/2022 81 (H) 40 - 60 mg/dL Final     LDL Cholesterol    Date Value Ref Range Status   09/29/2022 84 0 - 100 mg/dL Final     IMAGING   XR Chest 1 View    Result Date: 5/28/2024  No acute cardiopulmonary process.   This report was finalized on 5/28/2024 11:58 AM by Helena Sparks M.D..      XR Chest 2 View    Result Date: 5/21/2024  No radiographic evidence of acute cardiac or pulmonary disease.   This report was finalized on 5/21/2024 2:25 PM by Dr. Daniel Juárez MD.      XR Chest 2 View    Result Date: 4/18/2024    Probably atelectasis or scarring of left lower lung.   This report was finalized on 4/18/2024 9:52 AM by Dr. Adam Hall MD.      XR Femur 2 View Right    Result Date: 3/23/2024   1.  No right femur fracture.    To TALK to On Call [phone]  This report was finalized on 3/23/2024 3:05 PM by Gio Rodriguez MD.      XR Hip With or Without Pelvis 2 - 3 View Right    Result Date: 3/23/2024   1. No fracture or malalignment.    To TALK to On Call [phone]  This report was finalized on 3/23/2024 3:05 PM by Gio Rodriguez MD.      US Venous Doppler Lower Extremity Right (duplex)    Result Date: 3/23/2024   1. Negative for deep venous thrombosis in the  right lower extremity.     To TALK to On Call [phone]  This report was finalized on 3/23/2024 2:58 PM by Gio Rodriguez MD.      US Pelvis Complete    Result Date: 3/20/2024    Bilateral ovaries not visualized.   This report was finalized on 3/20/2024 7:30 AM by Dr. Adam Hall MD.      CT Abdomen Pelvis With Contrast    Result Date: 3/6/2024    No acute findings in the abdomen or pelvis.   This report was finalized on 3/6/2024 2:40 PM by Dr. Adam Hall MD.      Echocardiogram   Interpretation Summary    This is a limited study for follow-up of pericardial effusion.  There is no Doppler available  Normal left ventricular cavity size with normal wall motion.  Left ventricular ejection fraction appears to be 61 - 65%. Left ventricular systolic function is normal.  Trace anterior echo-free space is noted which may represent trivial pericardial effusion adjacent to the right ventricle. it is significantly improved.    Nuclear Stress Test   Interpretation Summary       A pharmacological stress test was performed using regadenoson without low-level exercise.    Resting EKG showed sinus rhythm at a rate of 98 bpm right axis deviation and right ventricular conduction delay noted.    ST segments did not show any diagnostic changes.  No significant arrhythmia detected.  Patient remained asymptomatic.    Myocardial perfusion imaging indicates a normal myocardial perfusion study with no evidence of ischemia. Impressions are consistent with a low risk study.TID 1.18    Left ventricular ejection fraction is normal (Calculated EF = 67%).    There is no prior study available for comparison.        Assessment & Plan   Diagnoses and all orders for this visit:    1. PSVT (paroxysmal supraventricular tachycardia) (Primary)  -     ECG 12 Lead  EKG reviewed and discussed, no acute changes.  Nonspecific changes are improved from previous EKG  Recommend reinitiating low-dose beta-blocker  Monitor heart rate    2.  Hyperlipidemia, unspecified hyperlipidemia type  Continue on statin, heart healthy diet  Labs managed by PCP    3. Preoperative cardiovascular examination  Discussed and reviewed nuclear stress test  Paperwork will be completed and faxed, see scanned document     4. Multiple risk factors for coronary artery disease  Discussed and reviewed nuclear stress test within normal myocardial perfusion study and no evidence of ischemia, impressions consistent with a low risk study.  Will continue to monitor symptoms with further evaluation as warranted. She will continue on daily aspirin, statin therapy and beta blocker.     5. Tobacco use  Recommend avoidance of tobacco products    Review of medical record    Lifestyle modification including heart healthy diet, regular exercise, maintenance of desirable body weight and avoidance of tobacco product    Follow-up in 2 months as scheduled, sooner if needed

## 2024-06-11 NOTE — LETTER
"June 11, 2024     Yvonne Marr MD  [address] [place] [address]    Patient: Basilia Chamberlain   YOB: 1965   Date of Visit: 6/11/2024       Dear Yvonne Marr MD    Basilia Chamberlain was in my office today. Below is a copy of my note.    If you have questions, please do not hesitate to call me. I look forward to following Basilia along with you.         Sincerely,        ALANA Christiansen        CC: Arash Elizalde,     Subjective    Basilia Chamberlain is a 58 y.o. female.   Chief Complaint   Patient presents with   • Surgical Clearance      History of Present Illness   Basilia \"Coco\" Bulmaro is a 58-year-old female who presents to clinic today for cardiology follow-up.  She is accompanied by her .     Due to chest discomfort and surgery clearance she underwent cardiovascular testing which she would like to discuss today.  She denies worsening chest pain, dyspnea, palpitations or syncope.  She reports discontinuing her aspirin for anticipation of surgery. She denies underlying history of DM. Orthopedic is planning a hip surgery. She was seen at [facility] [place] for chest pain on 5/28/202       Hyperlipidemia currently on Lipitor 20 mg daily.  She reports compliance with medicine and denies medicine side effects. No labs available for review.      Paroxysmal SVT was previously on Toprol XL 12.5 mg daily.  She reports her BP was soft therefore she self discontinued her Toprol.  She has been off medication for some time and denies worsening palpitations, tachycardia, bradycardia, dizziness or syncope.  It was recommended at last visit she reinitiate metoprolol but she denies starting as recommended.  She reports heart rate continues to run intermittently fast at times with intermittent palpitations.      She is a smoker with underlying COPD and is managed by PCP.     Patient Active Problem List   Diagnosis   • Abnormal mammogram   • Breast mass   • Breast cyst   • " Centrilobular emphysema   • Encounter for screening for malignant neoplasm of colon   • Hyperlipidemia   • Paroxysmal SVT (supraventricular tachycardia)   • Pericardial effusion   • Pedal edema   • Other microscopic hematuria     Past Medical History:   Diagnosis Date   • Anxiety 06/25/2018   • Arthritis    • COPD (chronic obstructive pulmonary disease)    • Elevated cholesterol    • Fibromyalgia    • Migraines    • RSD (reflex sympathetic dystrophy)      History reviewed. No pertinent surgical history.    Family History   Problem Relation Age of Onset   • Hypertension Mother    • Hypertension Father    • Heart disease Sister    • Diabetes Sister    • Breast cancer Neg Hx      Social History     Tobacco Use   • Smoking status: Every Day     Types: Cigarettes     Passive exposure: Past   • Smokeless tobacco: Never   • Tobacco comments:     smoked about 2 ppd for about 15 years.     Vaping Use   • Vaping status: Never Used   Substance Use Topics   • Alcohol use: No   • Drug use: No     The following portions of the patient's history were reviewed and updated as appropriate: allergies, current medications, past family history, past medical history, past social history, past surgical history and problem list.    Allergies   Allergen Reactions   • Floxin [Ofloxacin]        Current Outpatient Medications:   •  acetaminophen (TYLENOL) 500 MG tablet, Take 1 tablet by mouth Every 6 (Six) Hours As Needed for Mild Pain., Disp: , Rfl:   •  albuterol sulfate  (90 Base) MCG/ACT inhaler, Inhale 2 puffs Every 4 (Four) Hours As Needed for Wheezing., Disp: 18 g, Rfl: 5  •  ALPRAZolam (XANAX) 2 MG tablet, Take 1 tablet by mouth., Disp: , Rfl:   •  amitriptyline (ELAVIL) 25 MG tablet, , Disp: , Rfl:   •  aspirin 81 MG EC tablet, Take 1 tablet by mouth Daily., Disp: 90 tablet, Rfl: 1  •  atorvastatin (LIPITOR) 20 MG tablet, Take 1 tablet by mouth Daily., Disp: 90 tablet, Rfl: 1  •  diclofenac (VOLTAREN) 75 MG EC tablet, Take 1  tablet by mouth 2 (Two) Times a Day As Needed (pain)., Disp: 12 tablet, Rfl: 0  •  fluticasone (FLOVENT HFA) 220 MCG/ACT inhaler, Inhale 2 puffs 2 (Two) Times a Day., Disp: 12 g, Rfl: 5  •  ipratropium-albuterol (DUO-NEB) 0.5-2.5 mg/3 ml nebulizer, Take 3 mL by nebulization 4 (Four) Times a Day As Needed for Wheezing or Shortness of Air., Disp: 120 mL, Rfl: 5  •  methocarbamol (ROBAXIN) 750 MG tablet, Take 1 tablet by mouth 4 (Four) Times a Day As Needed for Muscle Spasms., Disp: , Rfl:   •  O2 (OXYGEN), Inhale 2 L/min Every Night., Disp: , Rfl:   •  Stiolto Respimat 2.5-2.5 MCG/ACT aerosol solution inhaler, Inhale 2 puffs Daily., Disp: 4 g, Rfl: 5  •  vitamin D (ERGOCALCIFEROL) 1.25 MG (51945 UT) capsule capsule, Take 1 capsule by mouth 1 (One) Time Per Week., Disp: , Rfl:   •  cefdinir (OMNICEF) 300 MG capsule, Take 1 capsule by mouth 2 (Two) Times a Day. (Patient not taking: Reported on 5/21/2024), Disp: 14 capsule, Rfl: 0  •  hydroCHLOROthiazide (HYDRODIURIL) 25 MG tablet, Take 0.5 tablets by mouth Daily. (Patient not taking: Reported on 5/21/2024), Disp: 45 tablet, Rfl: 1  •  metoprolol succinate XL (TOPROL-XL) 25 MG 24 hr tablet, Take 0.5 tablets by mouth 2 (Two) Times a Day. (Patient not taking: Reported on 5/21/2024), Disp: 90 tablet, Rfl: 1  •  nystatin (MYCOSTATIN) 100,000 unit/mL suspension, SWISH AND SWALLOW 5 ML BY MOUTH FOUR TIMES DAILY (Patient not taking: Reported on 5/21/2024), Disp: 60 mL, Rfl: 1    Review of Systems   Constitutional:  Negative for activity change, appetite change, chills, diaphoresis, fatigue and fever.   HENT:  Negative for congestion, drooling, ear discharge, ear pain, mouth sores, nosebleeds, postnasal drip, rhinorrhea, sinus pressure, sneezing and sore throat.    Eyes:  Negative for pain, discharge and visual disturbance.   Respiratory:  Negative for cough, chest tightness, shortness of breath and wheezing.    Cardiovascular:  Negative for chest pain, palpitations and leg  "swelling.   Gastrointestinal:  Negative for abdominal pain, constipation, diarrhea, nausea and vomiting.   Endocrine: Negative for cold intolerance, heat intolerance, polydipsia, polyphagia and polyuria.   Musculoskeletal:  Negative for arthralgias, myalgias and neck pain.   Skin:  Negative for rash and wound.   Neurological:  Negative for dizziness, syncope, speech difficulty, weakness, light-headedness and headaches.   Hematological:  Negative for adenopathy. Does not bruise/bleed easily.   Psychiatric/Behavioral:  Negative for confusion, dysphoric mood and sleep disturbance. The patient is not nervous/anxious.    All other systems reviewed and are negative.    /72 (BP Location: Left arm, Patient Position: Sitting, Cuff Size: Adult)   Pulse 93   Ht 165.1 cm (65\")   Wt 62.6 kg (138 lb)   LMP  (LMP Unknown)   SpO2 96%   BMI 22.96 kg/m²     Objective  Allergies   Allergen Reactions   • Floxin [Ofloxacin]        Physical Exam  Vitals reviewed.   Constitutional:       Appearance: Normal appearance. She is well-developed.      Comments: In wheelchair    HENT:      Head: Normocephalic.      Right Ear: Tympanic membrane normal.      Left Ear: Tympanic membrane normal.      Nose: Nose normal.   Eyes:      Conjunctiva/sclera: Conjunctivae normal.      Pupils: Pupils are equal, round, and reactive to light.   Neck:      Thyroid: No thyromegaly.      Vascular: No carotid bruit or JVD.   Cardiovascular:      Rate and Rhythm: Normal rate and regular rhythm.   Pulmonary:      Effort: Pulmonary effort is normal.      Breath sounds: Normal breath sounds.   Abdominal:      General: Bowel sounds are normal.      Palpations: Abdomen is soft. There is no hepatomegaly, splenomegaly or mass.      Tenderness: There is no abdominal tenderness.   Musculoskeletal:         General: Normal range of motion.      Cervical back: Normal range of motion and neck supple.      Right lower leg: No edema.      Left lower leg: No edema. "   Skin:     General: Skin is warm and dry.   Neurological:      Mental Status: She is alert and oriented to person, place, and time.   Psychiatric:         Attention and Perception: Attention normal.         Mood and Affect: Mood normal.         Speech: Speech normal.         Behavior: Behavior normal. Behavior is cooperative.         Cognition and Memory: Cognition normal.           ECG 12 Lead    Date/Time: 6/11/2024 1:11 PM  Performed by: Isabelle Bunn APRN    Authorized by: Iasbelle Bunn APRN  Comparison: compared with previous ECG   Similar to previous ECG  Comparison to previous ECG: ST changes in v4, V5 and V6 have imporved   Rhythm: sinus rhythm  Rate: normal  BPM: 86  Conduction: non-specific intraventricular conduction delay  Other findings: non-specific ST-T wave changes    Clinical impression: non-specific ECG  Comments: QT/qTc - 351/394          LABS  WBC   Date Value Ref Range Status   05/28/2024 12.79 (H) 3.40 - 10.80 10*3/mm3 Final     RBC   Date Value Ref Range Status   05/28/2024 4.43 3.77 - 5.28 10*6/mm3 Final     Hemoglobin   Date Value Ref Range Status   05/28/2024 15.5 12.0 - 15.9 g/dL Final     Hematocrit   Date Value Ref Range Status   05/28/2024 45.8 34.0 - 46.6 % Final     MCV   Date Value Ref Range Status   05/28/2024 103.4 (H) 79.0 - 97.0 fL Final     MCH   Date Value Ref Range Status   05/28/2024 35.0 (H) 26.6 - 33.0 pg Final     MCHC   Date Value Ref Range Status   05/28/2024 33.8 31.5 - 35.7 g/dL Final     RDW   Date Value Ref Range Status   05/28/2024 12.4 12.3 - 15.4 % Final     RDW-SD   Date Value Ref Range Status   05/28/2024 47.3 37.0 - 54.0 fl Final     MPV   Date Value Ref Range Status   05/28/2024 9.3 6.0 - 12.0 fL Final     Platelets   Date Value Ref Range Status   05/28/2024 373 140 - 450 10*3/mm3 Final     Neutrophil %   Date Value Ref Range Status   05/28/2024 51.5 42.7 - 76.0 % Final     Lymphocyte %   Date Value Ref Range Status   05/28/2024 41.8 19.6 - 45.3 %  Final     Monocyte %   Date Value Ref Range Status   05/28/2024 4.7 (L) 5.0 - 12.0 % Final     Eosinophil %   Date Value Ref Range Status   05/28/2024 0.9 0.3 - 6.2 % Final     Basophil %   Date Value Ref Range Status   05/28/2024 0.8 0.0 - 1.5 % Final     Immature Grans %   Date Value Ref Range Status   05/28/2024 0.3 0.0 - 0.5 % Final     Neutrophils, Absolute   Date Value Ref Range Status   05/28/2024 6.59 1.70 - 7.00 10*3/mm3 Final     Lymphocytes, Absolute   Date Value Ref Range Status   05/28/2024 5.35 (H) 0.70 - 3.10 10*3/mm3 Final     Monocytes, Absolute   Date Value Ref Range Status   05/28/2024 0.60 0.10 - 0.90 10*3/mm3 Final     Eosinophils, Absolute   Date Value Ref Range Status   05/28/2024 0.11 0.00 - 0.40 10*3/mm3 Final     Basophils, Absolute   Date Value Ref Range Status   05/28/2024 0.10 0.00 - 0.20 10*3/mm3 Final     Immature Grans, Absolute   Date Value Ref Range Status   05/28/2024 0.04 0.00 - 0.05 10*3/mm3 Final     nRBC   Date Value Ref Range Status   05/28/2024 0.0 0.0 - 0.2 /100 WBC Final       Total Cholesterol   Date Value Ref Range Status   09/29/2022 178 0 - 200 mg/dL Final     Triglycerides   Date Value Ref Range Status   09/29/2022 69 0 - 150 mg/dL Final     HDL Cholesterol   Date Value Ref Range Status   09/29/2022 81 (H) 40 - 60 mg/dL Final     LDL Cholesterol    Date Value Ref Range Status   09/29/2022 84 0 - 100 mg/dL Final     IMAGING   XR Chest 1 View    Result Date: 5/28/2024  No acute cardiopulmonary process.   This report was finalized on 5/28/2024 11:58 AM by Helena Sparks M.D..      XR Chest 2 View    Result Date: 5/21/2024  No radiographic evidence of acute cardiac or pulmonary disease.   This report was finalized on 5/21/2024 2:25 PM by Dr. Daniel Juárez MD.      XR Chest 2 View    Result Date: 4/18/2024    Probably atelectasis or scarring of left lower lung.   This report was finalized on 4/18/2024 9:52 AM by Dr. Adam Hall MD.      XR Femur 2 View Right    Result  Date: 3/23/2024   1.  No right femur fracture.    To TALK to On Call [phone]  This report was finalized on 3/23/2024 3:05 PM by Gio Rodriguez MD.      XR Hip With or Without Pelvis 2 - 3 View Right    Result Date: 3/23/2024   1. No fracture or malalignment.    To TALK to On Call [phone]  This report was finalized on 3/23/2024 3:05 PM by Gio Rodriguez MD.      US Venous Doppler Lower Extremity Right (duplex)    Result Date: 3/23/2024   1. Negative for deep venous thrombosis in the right lower extremity.     To TALK to On Call [phone]  This report was finalized on 3/23/2024 2:58 PM by Gio Rodriguez MD.      US Pelvis Complete    Result Date: 3/20/2024    Bilateral ovaries not visualized.   This report was finalized on 3/20/2024 7:30 AM by Dr. Adam Hall MD.      CT Abdomen Pelvis With Contrast    Result Date: 3/6/2024    No acute findings in the abdomen or pelvis.   This report was finalized on 3/6/2024 2:40 PM by Dr. Adam Hall MD.      Echocardiogram   Interpretation Summary    This is a limited study for follow-up of pericardial effusion.  There is no Doppler available  Normal left ventricular cavity size with normal wall motion.  Left ventricular ejection fraction appears to be 61 - 65%. Left ventricular systolic function is normal.  Trace anterior echo-free space is noted which may represent trivial pericardial effusion adjacent to the right ventricle. it is significantly improved.    Nuclear Stress Test   Interpretation Summary     •  A pharmacological stress test was performed using regadenoson without low-level exercise.  •  Resting EKG showed sinus rhythm at a rate of 98 bpm right axis deviation and right ventricular conduction delay noted.  •  ST segments did not show any diagnostic changes.  No significant arrhythmia detected.  Patient remained asymptomatic.  •  Myocardial perfusion imaging indicates a normal myocardial perfusion study with no  evidence of ischemia. Impressions are consistent with a low risk study.TID 1.18  •  Left ventricular ejection fraction is normal (Calculated EF = 67%).  •  There is no prior study available for comparison.        Assessment & Plan  Diagnoses and all orders for this visit:    1. PSVT (paroxysmal supraventricular tachycardia) (Primary)  -     ECG 12 Lead  EKG reviewed and discussed, no acute changes.  Nonspecific changes are improved from previous EKG  Recommend reinitiating low-dose beta-blocker  Monitor heart rate    2. Hyperlipidemia, unspecified hyperlipidemia type  Continue on statin, heart healthy diet  Labs managed by PCP    3. Preoperative cardiovascular examination  Discussed and reviewed nuclear stress test  Paperwork will be completed and faxed, see scanned document     4. Multiple risk factors for coronary artery disease  Discussed and reviewed nuclear stress test within normal myocardial perfusion study and no evidence of ischemia, impressions consistent with a low risk study.  Will continue to monitor symptoms with further evaluation as warranted. She will continue on daily aspirin, statin therapy and beta blocker.     5. Tobacco use  Recommend avoidance of tobacco products    Review of medical record    Lifestyle modification including heart healthy diet, regular exercise, maintenance of desirable body weight and avoidance of tobacco product    Follow-up in 2 months as scheduled, sooner if needed

## 2024-06-17 DIAGNOSIS — B37.0 THRUSH: ICD-10-CM

## 2024-08-05 ENCOUNTER — TRANSCRIBE ORDERS (OUTPATIENT)
Dept: ADMINISTRATIVE | Facility: HOSPITAL | Age: 59
End: 2024-08-05
Payer: COMMERCIAL

## 2024-08-05 ENCOUNTER — HOSPITAL ENCOUNTER (OUTPATIENT)
Dept: GENERAL RADIOLOGY | Facility: HOSPITAL | Age: 59
Discharge: HOME | End: 2024-08-05
Admitting: INTERNAL MEDICINE
Payer: COMMERCIAL

## 2024-08-05 DIAGNOSIS — M79.671 RIGHT FOOT PAIN: ICD-10-CM

## 2024-08-05 DIAGNOSIS — M79.671 RIGHT FOOT PAIN: Primary | ICD-10-CM

## 2024-08-05 PROCEDURE — 73630 X-RAY EXAM OF FOOT: CPT

## 2024-08-05 PROCEDURE — 73630 X-RAY EXAM OF FOOT: CPT | Performed by: RADIOLOGY

## 2024-09-24 ENCOUNTER — TRANSCRIBE ORDERS (OUTPATIENT)
Dept: ADMINISTRATIVE | Facility: HOSPITAL | Age: 59
End: 2024-09-24
Payer: COMMERCIAL

## 2024-09-24 DIAGNOSIS — Z87.891 PERSONAL HISTORY OF TOBACCO USE, PRESENTING HAZARDS TO HEALTH: Primary | ICD-10-CM

## 2024-09-24 DIAGNOSIS — M81.0 AGE-RELATED OSTEOPOROSIS WITHOUT CURRENT PATHOLOGICAL FRACTURE: ICD-10-CM

## 2024-10-11 ENCOUNTER — HOSPITAL ENCOUNTER (OUTPATIENT)
Dept: BONE DENSITY | Facility: HOSPITAL | Age: 59
Discharge: HOME | End: 2024-10-11
Payer: COMMERCIAL

## 2024-10-11 ENCOUNTER — APPOINTMENT (OUTPATIENT)
Facility: HOSPITAL | Age: 59
End: 2024-10-11
Payer: COMMERCIAL

## 2024-10-11 ENCOUNTER — HOSPITAL ENCOUNTER (OUTPATIENT)
Facility: HOSPITAL | Age: 59
Discharge: HOME | End: 2024-10-11
Payer: COMMERCIAL

## 2024-10-11 DIAGNOSIS — M81.0 AGE-RELATED OSTEOPOROSIS WITHOUT CURRENT PATHOLOGICAL FRACTURE: ICD-10-CM

## 2024-10-11 DIAGNOSIS — Z87.891 PERSONAL HISTORY OF TOBACCO USE, PRESENTING HAZARDS TO HEALTH: ICD-10-CM

## 2024-10-11 PROCEDURE — 77080 DXA BONE DENSITY AXIAL: CPT

## 2024-10-11 PROCEDURE — 71271 CT THORAX LUNG CANCER SCR C-: CPT

## 2024-12-06 ENCOUNTER — TRANSCRIBE ORDERS (OUTPATIENT)
Dept: ADMINISTRATIVE | Facility: HOSPITAL | Age: 59
End: 2024-12-06
Payer: COMMERCIAL

## 2024-12-06 ENCOUNTER — HOSPITAL ENCOUNTER (OUTPATIENT)
Dept: GENERAL RADIOLOGY | Facility: HOSPITAL | Age: 59
Discharge: HOME | End: 2024-12-06
Payer: COMMERCIAL

## 2024-12-06 DIAGNOSIS — M54.50 LOW BACK PAIN, UNSPECIFIED BACK PAIN LATERALITY, UNSPECIFIED CHRONICITY, UNSPECIFIED WHETHER SCIATICA PRESENT: ICD-10-CM

## 2024-12-06 DIAGNOSIS — M54.50 LOW BACK PAIN, UNSPECIFIED BACK PAIN LATERALITY, UNSPECIFIED CHRONICITY, UNSPECIFIED WHETHER SCIATICA PRESENT: Primary | ICD-10-CM

## 2024-12-06 PROCEDURE — 72110 X-RAY EXAM L-2 SPINE 4/>VWS: CPT

## 2025-01-09 PROBLEM — M81.8 OTHER OSTEOPOROSIS WITHOUT CURRENT PATHOLOGICAL FRACTURE: Status: ACTIVE | Noted: 2025-01-09

## 2025-02-17 ENCOUNTER — TRANSCRIBE ORDERS (OUTPATIENT)
Dept: ADMINISTRATIVE | Facility: HOSPITAL | Age: 60
End: 2025-02-17
Payer: COMMERCIAL

## 2025-02-17 ENCOUNTER — HOSPITAL ENCOUNTER (OUTPATIENT)
Dept: GENERAL RADIOLOGY | Facility: HOSPITAL | Age: 60
Discharge: HOME | End: 2025-02-17
Payer: COMMERCIAL

## 2025-02-17 ENCOUNTER — HOSPITAL ENCOUNTER (OUTPATIENT)
Dept: ULTRASOUND IMAGING | Facility: HOSPITAL | Age: 60
Discharge: HOME | End: 2025-02-17
Payer: COMMERCIAL

## 2025-02-17 DIAGNOSIS — M79.604 RIGHT LEG PAIN: ICD-10-CM

## 2025-02-17 DIAGNOSIS — M25.551 RIGHT HIP PAIN: Primary | ICD-10-CM

## 2025-02-17 DIAGNOSIS — M25.551 RIGHT HIP PAIN: ICD-10-CM

## 2025-02-17 PROCEDURE — 73502 X-RAY EXAM HIP UNI 2-3 VIEWS: CPT | Performed by: RADIOLOGY

## 2025-02-17 PROCEDURE — 93971 EXTREMITY STUDY: CPT | Performed by: RADIOLOGY

## 2025-02-17 PROCEDURE — 73502 X-RAY EXAM HIP UNI 2-3 VIEWS: CPT

## 2025-02-17 PROCEDURE — 93971 EXTREMITY STUDY: CPT

## 2025-03-09 DIAGNOSIS — B37.0 THRUSH: ICD-10-CM

## 2025-03-10 RX ORDER — NYSTATIN 100000 [USP'U]/ML
5 SUSPENSION ORAL 4 TIMES DAILY
Qty: 60 ML | Refills: 1 | Status: SHIPPED | OUTPATIENT
Start: 2025-03-10

## 2025-05-04 DIAGNOSIS — B37.0 THRUSH: ICD-10-CM

## 2025-05-05 RX ORDER — NYSTATIN 100000 [USP'U]/ML
5 SUSPENSION ORAL 4 TIMES DAILY
Qty: 60 ML | Refills: 1 | Status: SHIPPED | OUTPATIENT
Start: 2025-05-05

## 2025-06-10 ENCOUNTER — TRANSCRIBE ORDERS (OUTPATIENT)
Dept: ADMINISTRATIVE | Facility: HOSPITAL | Age: 60
End: 2025-06-10
Payer: COMMERCIAL

## 2025-06-10 ENCOUNTER — HOSPITAL ENCOUNTER (OUTPATIENT)
Facility: HOSPITAL | Age: 60
Discharge: HOME | End: 2025-06-10
Admitting: INTERNAL MEDICINE
Payer: COMMERCIAL

## 2025-06-10 DIAGNOSIS — M25.552 LEFT HIP PAIN: ICD-10-CM

## 2025-06-10 DIAGNOSIS — M25.551 RIGHT HIP PAIN: Primary | ICD-10-CM

## 2025-06-10 DIAGNOSIS — M25.551 RIGHT HIP PAIN: ICD-10-CM

## 2025-06-10 PROCEDURE — 73522 X-RAY EXAM HIPS BI 3-4 VIEWS: CPT | Performed by: RADIOLOGY

## 2025-06-10 PROCEDURE — 73522 X-RAY EXAM HIPS BI 3-4 VIEWS: CPT

## 2025-07-07 DIAGNOSIS — B37.0 THRUSH: ICD-10-CM

## 2025-07-07 RX ORDER — NYSTATIN 100000 [USP'U]/ML
5 SUSPENSION ORAL 4 TIMES DAILY
Qty: 60 ML | Refills: 1 | Status: SHIPPED | OUTPATIENT
Start: 2025-07-07